# Patient Record
Sex: FEMALE | Race: WHITE | Employment: OTHER | URBAN - METROPOLITAN AREA
[De-identification: names, ages, dates, MRNs, and addresses within clinical notes are randomized per-mention and may not be internally consistent; named-entity substitution may affect disease eponyms.]

---

## 2021-05-12 ENCOUNTER — HOSPITAL ENCOUNTER (OUTPATIENT)
Dept: NON INVASIVE DIAGNOSTICS | Age: 76
Discharge: HOME | End: 2021-05-12
Payer: MEDICARE

## 2021-05-12 VITALS
WEIGHT: 155 LBS | RESPIRATION RATE: 18 BRPM | DIASTOLIC BLOOD PRESSURE: 56 MMHG | HEART RATE: 62 BPM | BODY MASS INDEX: 29.27 KG/M2 | HEIGHT: 61 IN | SYSTOLIC BLOOD PRESSURE: 158 MMHG | OXYGEN SATURATION: 98 %

## 2021-05-12 DIAGNOSIS — I48.91 ATRIAL FIBRILLATION, UNSPECIFIED TYPE (HCC): ICD-10-CM

## 2021-05-12 LAB
ATRIAL RATE: 54 BPM
CALCULATED P AXIS, ECG09: 91 DEGREES
CALCULATED R AXIS, ECG10: 54 DEGREES
CALCULATED R AXIS, ECG10: 58 DEGREES
CALCULATED T AXIS, ECG11: -64 DEGREES
CALCULATED T AXIS, ECG11: 58 DEGREES
DIAGNOSIS, 93000: NORMAL
DIAGNOSIS, 93000: NORMAL
P-R INTERVAL, ECG05: 200 MS
Q-T INTERVAL, ECG07: 376 MS
Q-T INTERVAL, ECG07: 424 MS
QRS DURATION, ECG06: 86 MS
QRS DURATION, ECG06: 86 MS
QTC CALCULATION (BEZET), ECG08: 397 MS
QTC CALCULATION (BEZET), ECG08: 402 MS
VENTRICULAR RATE, ECG03: 54 BPM
VENTRICULAR RATE, ECG03: 67 BPM

## 2021-05-12 PROCEDURE — 92960 CARDIOVERSION ELECTRIC EXT: CPT

## 2021-05-12 PROCEDURE — 77030018729 HC ELECTRD DEFIB PAD CARD -B

## 2021-05-12 PROCEDURE — 74011250636 HC RX REV CODE- 250/636: Performed by: INTERNAL MEDICINE

## 2021-05-12 PROCEDURE — 99152 MOD SED SAME PHYS/QHP 5/>YRS: CPT

## 2021-05-12 PROCEDURE — 93005 ELECTROCARDIOGRAM TRACING: CPT

## 2021-05-12 RX ORDER — FLECAINIDE ACETATE 100 MG/1
100 TABLET ORAL 2 TIMES DAILY
Qty: 60 TAB | Refills: 0 | Status: ON HOLD
Start: 2021-05-12 | End: 2022-02-08 | Stop reason: ALTCHOICE

## 2021-05-12 RX ORDER — FENTANYL CITRATE 50 UG/ML
12.5-5 INJECTION, SOLUTION INTRAMUSCULAR; INTRAVENOUS
Status: DISCONTINUED | OUTPATIENT
Start: 2021-05-12 | End: 2021-05-12

## 2021-05-12 RX ORDER — MIDAZOLAM HYDROCHLORIDE 1 MG/ML
.5-2 INJECTION, SOLUTION INTRAMUSCULAR; INTRAVENOUS
Status: DISCONTINUED | OUTPATIENT
Start: 2021-05-12 | End: 2021-05-12

## 2021-05-12 RX ADMIN — MIDAZOLAM 2 MG: 1 INJECTION INTRAMUSCULAR; INTRAVENOUS at 08:17

## 2021-05-12 RX ADMIN — FENTANYL CITRATE 50 MCG: 50 INJECTION, SOLUTION INTRAMUSCULAR; INTRAVENOUS at 08:17

## 2021-05-12 RX ADMIN — MIDAZOLAM 1 MG: 1 INJECTION INTRAMUSCULAR; INTRAVENOUS at 08:21

## 2021-05-12 NOTE — PROGRESS NOTES
Received report from [facility], [place]. Pt arrived to recovery bay with belongings without distress. Education provided call bell at her side.

## 2021-05-12 NOTE — PROGRESS NOTES
0700 - Patient arrived to Non-Invasive Cardiology Lab for Out Patient Cardioversion Procedure. Staff introduced to patient. Patient identifiers verified with Name and Date of Birth. Procedure verified with patient. Consent forms reviewed and signed by patient or authorized representative and verified. Allergies verified. Patient informed of procedure and plan of care. Questions answered with review. Patient on cardiac monitor, non-invasive blood pressure, SPO2 monitor. On 2LNC oxygen. Patient is A&Ox3. Patient reports no complaints. Patient on stretcher, in low position, with side rails up. Patient instructed to call for assistance as needed. Family (sister, Willie West) in waiting room.

## 2021-05-12 NOTE — DISCHARGE INSTRUCTIONS
DISCHARGE SUMMARY       The following personal items collected during your admission are returned to you:   Dental Appliance:  Dentures with patient  Clothing:  Mask and clothing        PATIENT INSTRUCTIONS: Continue taking all the same medications as previously prescribed. Start Flecainide 50 mg once in the morning and once in the evening. The medication has been called into your pharmacy. Please note the tablets will be in 100 mg tablets so please break the tablets in half to make the correct dose ( take 1/2 tablet in the morning and take 1/2 tablet in the evening). The cardioversion procedure can cause redness to the skin where the patches were placed. You may treat this with Aloe or Cortisone cream over the counter lotion. If the skin appears very reddened or blistered contact your physician      Call in the morning to make an appointment for you follow-up EKG in 2 weeks at [address] [place] [address]. What to do at Home:  Recommended activity: No driving today      The discharge information has been reviewed with the PATIENT/sister. The PATIENT  verbalized understanding. Cardiology Discharge Instructions             Patient ID:  Ángel Porras  [id number]  31 y.o.  1945    Admit Date: 5/12/2021     Discharge Date: 5/12/2021   Physician: Jordon Recinos MD    Admission and Discharge Diagnoses include:  1. Atrial fibrillation, persistent    Cardiology Procedures this Admission:  1. Electrical cardioversion, external, successfully restoring sinus rhythm    Visit Vitals  BP (!) 136/59 (BP 1 Location: Left upper arm, BP Patient Position: Reclining)   Pulse (!) 57   Resp 16   Ht 5' 1\" (1.549 m)   Wt 70.3 kg (155 lb)   SpO2 96%   BMI 29.29 kg/m²       Disposition: home with a     Patient Instructions:   Please continue all your usual medications AND start taking flecainide 50 mg by mouth twice daily.   The flecainide comes in a 100 mg tablet, please cut this in half to make the right dose.      FOLLOW-UP:       Call the office [phone] for any questions or concerns. Please make a follow-up in the office for 2 weeks to get an ECG since flecainide can cause minor and expected changes to the ECG and will want to have a good baseline study for the record. [address] [facility], [address]    [phone]  [place], [address] [facility]    [email or web address]    Thank you for placing your trust for your heart with the physicians and staff of [facility] We have long provided [facility] with the most advanced cardiovascular care possible using a personalized and caring approach. And we hope to continue this strong tradition well into the future.     Signed:  James Aguillon MD  5/12/2021

## 2021-05-12 NOTE — PROGRESS NOTES
Pt sedated with 3mg Versed and 50mcg Fentanyl for Cardioversion, gave one synchronized shock at 200 Joules, Afib/flutter converted to Sinus Rhythm/Mir. (monitored sedation from 0817 to 4577)    1031 -  Report to MELISSA Dewitt - pt moved to recovery - pt talking w/ Dr. Diana Ordoñez - Dr. Diana Ordoñez also spoke w/sister in waiting room

## 2021-05-12 NOTE — PROGRESS NOTES
Will continue the usual medications with successful cardioversion and ADD flecainide 50 mg po BID. She will need to get an ECG in 2 weeks in the office.

## 2021-05-20 ENCOUNTER — HOSPITAL ENCOUNTER (EMERGENCY)
Age: 76
Discharge: HOME | End: 2021-05-20
Attending: STUDENT IN AN ORGANIZED HEALTH CARE EDUCATION/TRAINING PROGRAM
Payer: MEDICARE

## 2021-05-20 ENCOUNTER — APPOINTMENT (OUTPATIENT)
Dept: GENERAL RADIOLOGY | Age: 76
End: 2021-05-20
Attending: EMERGENCY MEDICINE
Payer: MEDICARE

## 2021-05-20 VITALS
SYSTOLIC BLOOD PRESSURE: 162 MMHG | WEIGHT: 154 LBS | BODY MASS INDEX: 29.07 KG/M2 | DIASTOLIC BLOOD PRESSURE: 58 MMHG | HEIGHT: 61 IN | TEMPERATURE: 98.4 F | HEART RATE: 54 BPM | RESPIRATION RATE: 20 BRPM | OXYGEN SATURATION: 98 %

## 2021-05-20 DIAGNOSIS — R06.00 DYSPNEA, UNSPECIFIED TYPE: Primary | ICD-10-CM

## 2021-05-20 LAB
ALBUMIN SERPL-MCNC: 3.5 G/DL (ref 3.5–5)
ALBUMIN/GLOB SERPL: 0.9 {RATIO} (ref 1.1–2.2)
ALP SERPL-CCNC: 87 U/L (ref 45–117)
ALT SERPL-CCNC: 21 U/L (ref 12–78)
ANION GAP SERPL CALC-SCNC: 6 MMOL/L (ref 5–15)
AST SERPL-CCNC: 20 U/L (ref 15–37)
ATRIAL RATE: 55 BPM
BASOPHILS # BLD: 0 K/UL (ref 0–0.1)
BASOPHILS NFR BLD: 1 % (ref 0–1)
BILIRUB SERPL-MCNC: 0.9 MG/DL (ref 0.2–1)
BNP SERPL-MCNC: 919 PG/ML
BUN SERPL-MCNC: 15 MG/DL (ref 6–20)
BUN/CREAT SERPL: 15 (ref 12–20)
CALCIUM SERPL-MCNC: 8.6 MG/DL (ref 8.5–10.1)
CALCULATED P AXIS, ECG09: 66 DEGREES
CALCULATED R AXIS, ECG10: 67 DEGREES
CALCULATED T AXIS, ECG11: 1 DEGREES
CHLORIDE SERPL-SCNC: 100 MMOL/L (ref 97–108)
CK SERPL-CCNC: 60 U/L (ref 26–192)
CO2 SERPL-SCNC: 26 MMOL/L (ref 21–32)
CREAT SERPL-MCNC: 0.99 MG/DL (ref 0.55–1.02)
DIAGNOSIS, 93000: NORMAL
DIFFERENTIAL METHOD BLD: NORMAL
EOSINOPHIL # BLD: 0.2 K/UL (ref 0–0.4)
EOSINOPHIL NFR BLD: 3 % (ref 0–7)
ERYTHROCYTE [DISTWIDTH] IN BLOOD BY AUTOMATED COUNT: 13.6 % (ref 11.5–14.5)
GLOBULIN SER CALC-MCNC: 3.9 G/DL (ref 2–4)
GLUCOSE SERPL-MCNC: 119 MG/DL (ref 65–100)
HCT VFR BLD AUTO: 37.1 % (ref 35–47)
HGB BLD-MCNC: 11.9 G/DL (ref 11.5–16)
IMM GRANULOCYTES # BLD AUTO: 0 K/UL (ref 0–0.04)
IMM GRANULOCYTES NFR BLD AUTO: 0 % (ref 0–0.5)
LYMPHOCYTES # BLD: 1.8 K/UL (ref 0.8–3.5)
LYMPHOCYTES NFR BLD: 30 % (ref 12–49)
MCH RBC QN AUTO: 28.9 PG (ref 26–34)
MCHC RBC AUTO-ENTMCNC: 32.1 G/DL (ref 30–36.5)
MCV RBC AUTO: 90 FL (ref 80–99)
MONOCYTES # BLD: 0.6 K/UL (ref 0–1)
MONOCYTES NFR BLD: 11 % (ref 5–13)
NEUTS SEG # BLD: 3.2 K/UL (ref 1.8–8)
NEUTS SEG NFR BLD: 55 % (ref 32–75)
NRBC # BLD: 0 K/UL (ref 0–0.01)
NRBC BLD-RTO: 0 PER 100 WBC
P-R INTERVAL, ECG05: 168 MS
PLATELET # BLD AUTO: 167 K/UL (ref 150–400)
PMV BLD AUTO: 11.1 FL (ref 8.9–12.9)
POTASSIUM SERPL-SCNC: 4.3 MMOL/L (ref 3.5–5.1)
PROT SERPL-MCNC: 7.4 G/DL (ref 6.4–8.2)
Q-T INTERVAL, ECG07: 406 MS
QRS DURATION, ECG06: 94 MS
QTC CALCULATION (BEZET), ECG08: 388 MS
RBC # BLD AUTO: 4.12 M/UL (ref 3.8–5.2)
SODIUM SERPL-SCNC: 132 MMOL/L (ref 136–145)
TROPONIN I SERPL-MCNC: <0.05 NG/ML
VENTRICULAR RATE, ECG03: 55 BPM
WBC # BLD AUTO: 5.8 K/UL (ref 3.6–11)

## 2021-05-20 PROCEDURE — 71046 X-RAY EXAM CHEST 2 VIEWS: CPT

## 2021-05-20 PROCEDURE — 36415 COLL VENOUS BLD VENIPUNCTURE: CPT

## 2021-05-20 PROCEDURE — 84484 ASSAY OF TROPONIN QUANT: CPT

## 2021-05-20 PROCEDURE — 99284 EMERGENCY DEPT VISIT MOD MDM: CPT

## 2021-05-20 PROCEDURE — 83880 ASSAY OF NATRIURETIC PEPTIDE: CPT

## 2021-05-20 PROCEDURE — 93005 ELECTROCARDIOGRAM TRACING: CPT

## 2021-05-20 PROCEDURE — 80053 COMPREHEN METABOLIC PANEL: CPT

## 2021-05-20 PROCEDURE — 82550 ASSAY OF CK (CPK): CPT

## 2021-05-20 PROCEDURE — 85025 COMPLETE CBC W/AUTO DIFF WBC: CPT

## 2021-05-20 NOTE — ED PROVIDER NOTES
EMERGENCY DEPARTMENT HISTORY AND PHYSICAL EXAM      Date: 5/20/2021  Patient Name: Sweta Lang    History of Presenting Illness     Chief Complaint   Patient presents with    Shortness of Breath     x a few months. Pt has sleep study done that showed A-fib. Pt was evaluated by Cards and had cardioversion and told she should feel better, however pt reports SOB is worse. Pt labored in triage speaking in complete sentenses. History Provided By: Patient    HPI: Sweta Lang, 64 y.o. female with PMHx significant for A. 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 She reports that her cardioversion on 5/12 she has experienced worsening dyspnea on exertion, today noticed even dyspnea at rest.  Endorses intermittent chest tightness throughout this time as well. Also endorses bilateral lower extremity edema. Denies any fevers, chills, cough, headache, nausea or vomiting. Had some mild GI upset last week which resolved on Monday, denies any constipation or diarrhea. Patient states that she received both doses of her Covid vaccine      PCP: Didi Hdz MD    Current Outpatient Medications   Medication Sig Dispense Refill    apixaban (Eliquis) 5 mg tablet Take 5 mg by mouth two (2) times a day.  flecainide (TAMBOCOR) 100 mg tablet Take 1 Tab by mouth two (2) times a day. 60 Tab 0    simvastatin (ZOCOR) 20 mg tablet Take 20 mg by mouth nightly.  amLODIPine (NORVASC) 5 mg tablet Take 5 mg by mouth nightly.  Cholecalciferol, Vitamin D3, (VITAMIN D3) 1,000 unit Cap Take  by mouth.  omega-3 fatty acids-vitamin e (FISH OIL) 1,000 mg Cap Take 1 Cap by mouth daily.  psyllium (METAMUCIL SMOOTH TEXTURE) packet Take 1 Packet by mouth two (2) times a day.  dicyclomine (BENTYL) 10 mg capsule Take 10 mg by mouth four (4) times daily as needed.  metoprolol-XL (TOPROL XL) 50 mg XL tablet Take 25 mg by mouth nightly.  levothyroxine (SYNTHROID) 50 mcg tablet Take 75 mcg by mouth Daily (before breakfast). mon and thurs takes an additional 37.5mcg      esomeprazole (NEXIUM) 40 mg capsule Take  by mouth daily. Past History     Past Medical History:  Past Medical History:   Diagnosis Date    Aortic stenosis     secondary to rheumatic fever as a child, s/p AVR with porcine valve 10/09    Arrhythmia 2021    afib/flutter    Arrhythmia 2020    SVT    Arthritis     generalized    Diverticulosis     GERD (gastroesophageal reflux disease)     Ill-defined condition     diverticulosis    Other and unspecified hyperlipidemia     Sleep apnea     BiPAP    Stroke ([facility] [address]     5 days post post AOVR; no recurrence    Unspecified essential hypertension     Unspecified hypothyroidism        Past Surgical History:  Past Surgical History:   Procedure Laterality Date    DELIVERY       x3    FOOT/TOES SURGERY PROC UNLISTED      bilateral    HX AORTIC VALVE REPLACEMENT      10/09    HX APPENDECTOMY      HX CATARACT REMOVAL      bilateral and lens implant    HX GYN      3 c section    HX HYSTERECTOMY      HX ORTHOPAEDIC      tendon realigment, bunionectomy    HX ORTHOPAEDIC  14    RIGHT TOTAL KNEE ARTHROPLASTY        Family History:  Family History   Problem Relation Age of Onset    Thyroid Disease Sister     Other Other         brain aneurysm in father, paternal aunt and uncle and sister       Social History:  Social History     Tobacco Use    Smoking status: Never Smoker    Smokeless tobacco: Never Used   Vaping Use    Vaping Use: Never used   Substance Use Topics    Alcohol use: Yes     Comment: glass of wine every few months    Drug use: Yes     Types: Prescription, OTC       Allergies:   Allergies   Allergen Reactions    Penicillins Swelling     Difficulty breatheing    Erythromycin Nausea and Vomiting         Review of Systems   Review of Systems  Constitutional: Negative for fever, chills, and fatigue. HENT: Negative for congestion, sore throat, rhinorrhea, sneezing and neck stiffness   Eyes: Negative for discharge and redness. Respiratory: Shortness of breath, particularly on exertion  Cardiovascular: Chest tightness  Gastrointestinal: Negative for nausea, vomiting, abdominal pain, constipation, diarrhea and blood in stool. Genitourinary: Negative for dysuria, urgency, frequency, hematuria, flank pain, decreased urine volume, discharge,   Musculoskeletal: Negative for myalgias or joint pain . Skin: Negative for rash or lesions . Neurological: Negative weakness, light-headedness, numbness and headaches. Physical Exam   Physical Exam    GENERAL: alert and oriented, no acute distress  EYES: PEERL, No injection, discharge or icterus. ENT: Mucous membranes pink and moist.  NECK: Supple  LUNGS: Airway patent. Non-labored respirations. Breath sounds clear with good air entry bilaterally. HEART: Regular rate and rhythm. Mild bilateral lower extremity edema  ABDOMEN: Non-distended and non-tender, without guarding or rebound.   SKIN:  warm, dry  EXTREMITIES: Without swelling, tenderness or deformity, symmetric with normal ROM  NEUROLOGICAL: Alert, oriented      Diagnostic Study Results     Labs -     Recent Results (from the past 12 hour(s))   CBC WITH AUTOMATED DIFF    Collection Time: 05/20/21 11:26 AM   Result Value Ref Range    WBC 5.8 3.6 - 11.0 K/uL    RBC 4.12 3.80 - 5.20 M/uL    HGB 11.9 11.5 - 16.0 g/dL    HCT 37.1 35.0 - 47.0 %    MCV 90.0 80.0 - 99.0 FL    MCH 28.9 26.0 - 34.0 PG    MCHC 32.1 30.0 - 36.5 g/dL    RDW 13.6 11.5 - 14.5 %    PLATELET 876 099 - 911 K/uL    MPV 11.1 8.9 - 12.9 FL    NRBC 0.0 0  WBC    ABSOLUTE NRBC 0.00 0.00 - 0.01 K/uL    NEUTROPHILS 55 32 - 75 %    LYMPHOCYTES 30 12 - 49 %    MONOCYTES 11 5 - 13 %    EOSINOPHILS 3 0 - 7 %    BASOPHILS 1 0 - 1 %    IMMATURE GRANULOCYTES 0 0.0 - 0.5 %    ABS. NEUTROPHILS 3.2 1.8 - 8.0 K/UL    ABS. LYMPHOCYTES 1.8 0.8 - 3.5 K/UL    ABS. MONOCYTES 0.6 0.0 - 1.0 K/UL    ABS. EOSINOPHILS 0.2 0.0 - 0.4 K/UL    ABS. BASOPHILS 0.0 0.0 - 0.1 K/UL    ABS. IMM. GRANS. 0.0 0.00 - 0.04 K/UL    DF AUTOMATED     METABOLIC PANEL, COMPREHENSIVE    Collection Time: 05/20/21 11:26 AM   Result Value Ref Range    Sodium 132 (L) 136 - 145 mmol/L    Potassium 4.3 3.5 - 5.1 mmol/L    Chloride 100 97 - 108 mmol/L    CO2 26 21 - 32 mmol/L    Anion gap 6 5 - 15 mmol/L    Glucose 119 (H) 65 - 100 mg/dL    BUN 15 6 - 20 MG/DL    Creatinine 0.99 0.55 - 1.02 MG/DL    BUN/Creatinine ratio 15 12 - 20      GFR est AA >60 >60 ml/min/1.73m2    GFR est non-AA 54 (L) >60 ml/min/1.73m2    Calcium 8.6 8.5 - 10.1 MG/DL    Bilirubin, total 0.9 0.2 - 1.0 MG/DL    ALT (SGPT) 21 12 - 78 U/L    AST (SGOT) 20 15 - 37 U/L    Alk.  phosphatase 87 45 - 117 U/L    Protein, total 7.4 6.4 - 8.2 g/dL    Albumin 3.5 3.5 - 5.0 g/dL    Globulin 3.9 2.0 - 4.0 g/dL    A-G Ratio 0.9 (L) 1.1 - 2.2     CK W/ REFLX CKMB    Collection Time: 05/20/21 11:26 AM   Result Value Ref Range    CK 60 26 - 192 U/L   TROPONIN I    Collection Time: 05/20/21 11:26 AM   Result Value Ref Range    Troponin-I, Qt. <0.05 <0.05 ng/mL   NT-PRO BNP    Collection Time: 05/20/21 11:26 AM   Result Value Ref Range    NT pro- (H) <450 PG/ML   EKG, 12 LEAD, INITIAL    Collection Time: 05/20/21 11:28 AM   Result Value Ref Range    Ventricular Rate 55 BPM    Atrial Rate 55 BPM    P-R Interval 168 ms    QRS Duration 94 ms    Q-T Interval 406 ms    QTC Calculation (Bezet) 388 ms    Calculated P Axis 66 degrees    Calculated R Axis 67 degrees    Calculated T Axis 1 degrees    Diagnosis       Sinus bradycardia  Possible Anterior infarct (cited on or before 20-MAY-2021)  When compared with ECG of 12-MAY-2021 08:23,  No significant change was found         Radiologic Studies -   XR CHEST PA LAT   Final Result   No acute process or change compared to the prior exam.           CT Results  (Last 48 hours)    None        CXR Results  (Last 48 hours)               05/20/21 1203  XR CHEST PA LAT Final result    Impression:  No acute process or change compared to the prior exam.           Narrative:  Exam:  2 view chest       Indication: Shortness of breath       Comparison to 3/18/2014. PA and lateral views demonstrate normal heart size. The patient is status post   median sternotomy. There is no acute process in the lung fields. The osseous   structures are unremarkable. Medical Decision Making       I reviewed the vital signs, available nursing notes, past medical history, past surgical history, family history and social history. Vital Signs-Reviewed the patient's vital signs. Patient Vitals for the past 12 hrs:   Temp Pulse Resp BP SpO2   05/20/21 1119 98.4 °F (36.9 °C) (!) 49 18 (!) 161/60 100 %         Records Reviewed: Nursing Notes and Old Medical Records    Provider Notes (Medical Decision Making):   55-year-old female with PMH A. fib status post recent cardioversion, porcine Nordic valve, hypertension who presents for evaluation of worsening shortness of breath over the last week. On arrival patient is in acute distress, generally well-appearing, nontoxic. Heart and lung sounds soft nontender abdomen normal peripheral pulses. Mild lower extremity edema bilaterally. Exam otherwise relatively unremarkable. Vitals are stable on room air. EKG is sinus bradycardia without evidence of ischemia. Relatively unchanged from post cardioversion EKG. Labs ordered on triage are all within normal limits, negative troponin, x-ray negative for any acute findings. Will add on a BNP, perform bedside echo to evaluate for bedside fluid overload, general cardiac function. Dispo pending initial work-up    ED Course:   Initial assessment performed.  The patients presenting problems have been discussed, and they are in agreement with the care plan formulated and outlined with them. I have encouraged them to ask questions as they arise throughout their visit. ED Course as of May 20 1514   Thu May 20, 2021   1348 NT-PRO BNP(!):    NT pro-(!) [CC]   1434 Bedside echo reveals generally normal systolic function. No pericardial effusion. Normal chamber size without dilation. IVC is within normal limits with respiratory variation. Spoken with pharmacy in regards to flecainide, it is documented that up to 10% of patients may experience shortness of breath when started on this medication    [CC]      ED Course User Index  [CC] Bushra Wallace MD       PROGRESS:  The patient has been re-evaluated and sx have improved. Reviewed available results with patient and have counseled them on diagnosis and care plan. They have expressed understanding, and all their questions have been answered. PLAN:  1. Discharge with cardiology follow up    Diagnosis     Clinical Impression:   1. Dyspnea, unspecified type        Please note that this dictation was completed with Dragon, computer voice recognition software. Quite often unanticipated grammatical, syntax, homophones, and other interpretive errors are inadvertently transcribed by the computer software. Please disregard these errors. Additionally, please excuse any errors that have escaped final proofreading.

## 2021-05-20 NOTE — DISCHARGE INSTRUCTIONS
Please call your Cardiologist to ask if they would still like you to be taking your flecainide until your next appointment. Please continue to take all of your other medications as directed otherwise.  Return to the emergency department if your symptoms worsen

## 2021-05-20 NOTE — ED NOTES
Pt discharged by MD . Pt provided with discharge instructions Rx and instructions on follow up care. Pt out of ED in wheel chair  accompanied by family.

## 2021-12-27 ENCOUNTER — HOSPITAL ENCOUNTER (EMERGENCY)
Age: 76
Discharge: HOME | End: 2021-12-27
Attending: EMERGENCY MEDICINE
Payer: MEDICARE

## 2021-12-27 ENCOUNTER — APPOINTMENT (OUTPATIENT)
Dept: GENERAL RADIOLOGY | Age: 76
End: 2021-12-27
Attending: EMERGENCY MEDICINE
Payer: MEDICARE

## 2021-12-27 VITALS
SYSTOLIC BLOOD PRESSURE: 189 MMHG | HEIGHT: 61 IN | RESPIRATION RATE: 15 BRPM | WEIGHT: 156 LBS | TEMPERATURE: 98.2 F | BODY MASS INDEX: 29.45 KG/M2 | HEART RATE: 74 BPM | OXYGEN SATURATION: 100 % | DIASTOLIC BLOOD PRESSURE: 70 MMHG

## 2021-12-27 DIAGNOSIS — I50.9 ACUTE CONGESTIVE HEART FAILURE, UNSPECIFIED HEART FAILURE TYPE (HCC): Primary | ICD-10-CM

## 2021-12-27 LAB
ALBUMIN SERPL-MCNC: 3.6 G/DL (ref 3.5–5)
ALBUMIN/GLOB SERPL: 0.9 {RATIO} (ref 1.1–2.2)
ALP SERPL-CCNC: 96 U/L (ref 45–117)
ALT SERPL-CCNC: 25 U/L (ref 12–78)
ANION GAP SERPL CALC-SCNC: 7 MMOL/L (ref 5–15)
AST SERPL-CCNC: 22 U/L (ref 15–37)
ATRIAL RATE: 69 BPM
BASOPHILS # BLD: 0 K/UL (ref 0–0.1)
BASOPHILS NFR BLD: 0 % (ref 0–1)
BILIRUB SERPL-MCNC: 0.9 MG/DL (ref 0.2–1)
BNP SERPL-MCNC: 756 PG/ML
BUN SERPL-MCNC: 14 MG/DL (ref 6–20)
BUN/CREAT SERPL: 13 (ref 12–20)
CALCIUM SERPL-MCNC: 9.2 MG/DL (ref 8.5–10.1)
CALCULATED P AXIS, ECG09: 83 DEGREES
CALCULATED R AXIS, ECG10: 94 DEGREES
CALCULATED T AXIS, ECG11: 39 DEGREES
CHLORIDE SERPL-SCNC: 100 MMOL/L (ref 97–108)
CK SERPL-CCNC: 61 U/L (ref 26–192)
CO2 SERPL-SCNC: 26 MMOL/L (ref 21–32)
CREAT SERPL-MCNC: 1.05 MG/DL (ref 0.55–1.02)
DIAGNOSIS, 93000: NORMAL
DIFFERENTIAL METHOD BLD: ABNORMAL
EOSINOPHIL # BLD: 0.2 K/UL (ref 0–0.4)
EOSINOPHIL NFR BLD: 3 % (ref 0–7)
ERYTHROCYTE [DISTWIDTH] IN BLOOD BY AUTOMATED COUNT: 13.8 % (ref 11.5–14.5)
GLOBULIN SER CALC-MCNC: 4.1 G/DL (ref 2–4)
GLUCOSE SERPL-MCNC: 124 MG/DL (ref 65–100)
HCT VFR BLD AUTO: 37.4 % (ref 35–47)
HGB BLD-MCNC: 12.1 G/DL (ref 11.5–16)
IMM GRANULOCYTES # BLD AUTO: 0 K/UL (ref 0–0.04)
IMM GRANULOCYTES NFR BLD AUTO: 1 % (ref 0–0.5)
INR PPP: 1 (ref 0.9–1.1)
LYMPHOCYTES # BLD: 1.7 K/UL (ref 0.8–3.5)
LYMPHOCYTES NFR BLD: 28 % (ref 12–49)
MCH RBC QN AUTO: 29.1 PG (ref 26–34)
MCHC RBC AUTO-ENTMCNC: 32.4 G/DL (ref 30–36.5)
MCV RBC AUTO: 89.9 FL (ref 80–99)
MONOCYTES # BLD: 0.6 K/UL (ref 0–1)
MONOCYTES NFR BLD: 9 % (ref 5–13)
NEUTS SEG # BLD: 3.5 K/UL (ref 1.8–8)
NEUTS SEG NFR BLD: 59 % (ref 32–75)
NRBC # BLD: 0 K/UL (ref 0–0.01)
NRBC BLD-RTO: 0 PER 100 WBC
P-R INTERVAL, ECG05: 216 MS
PLATELET # BLD AUTO: 161 K/UL (ref 150–400)
PMV BLD AUTO: 10.8 FL (ref 8.9–12.9)
POTASSIUM SERPL-SCNC: 3.8 MMOL/L (ref 3.5–5.1)
PROT SERPL-MCNC: 7.7 G/DL (ref 6.4–8.2)
PROTHROMBIN TIME: 10.6 SEC (ref 9–11.1)
Q-T INTERVAL, ECG07: 404 MS
QRS DURATION, ECG06: 108 MS
QTC CALCULATION (BEZET), ECG08: 432 MS
RBC # BLD AUTO: 4.16 M/UL (ref 3.8–5.2)
SODIUM SERPL-SCNC: 133 MMOL/L (ref 136–145)
TROPONIN-HIGH SENSITIVITY: 11 NG/L (ref 0–51)
TROPONIN-HIGH SENSITIVITY: 16 NG/L (ref 0–51)
VENTRICULAR RATE, ECG03: 69 BPM
WBC # BLD AUTO: 6 K/UL (ref 3.6–11)

## 2021-12-27 PROCEDURE — 96374 THER/PROPH/DIAG INJ IV PUSH: CPT

## 2021-12-27 PROCEDURE — 85610 PROTHROMBIN TIME: CPT

## 2021-12-27 PROCEDURE — 99284 EMERGENCY DEPT VISIT MOD MDM: CPT

## 2021-12-27 PROCEDURE — 84484 ASSAY OF TROPONIN QUANT: CPT

## 2021-12-27 PROCEDURE — 83880 ASSAY OF NATRIURETIC PEPTIDE: CPT

## 2021-12-27 PROCEDURE — 80053 COMPREHEN METABOLIC PANEL: CPT

## 2021-12-27 PROCEDURE — 74011250637 HC RX REV CODE- 250/637: Performed by: EMERGENCY MEDICINE

## 2021-12-27 PROCEDURE — 93005 ELECTROCARDIOGRAM TRACING: CPT

## 2021-12-27 PROCEDURE — 71045 X-RAY EXAM CHEST 1 VIEW: CPT

## 2021-12-27 PROCEDURE — 36415 COLL VENOUS BLD VENIPUNCTURE: CPT

## 2021-12-27 PROCEDURE — 82550 ASSAY OF CK (CPK): CPT

## 2021-12-27 PROCEDURE — 94760 N-INVAS EAR/PLS OXIMETRY 1: CPT

## 2021-12-27 PROCEDURE — 74011250636 HC RX REV CODE- 250/636: Performed by: EMERGENCY MEDICINE

## 2021-12-27 PROCEDURE — 85025 COMPLETE CBC W/AUTO DIFF WBC: CPT

## 2021-12-27 RX ORDER — ACETAMINOPHEN 500 MG
1000 TABLET ORAL ONCE
Status: COMPLETED | OUTPATIENT
Start: 2021-12-27 | End: 2021-12-27

## 2021-12-27 RX ORDER — FUROSEMIDE 10 MG/ML
40 INJECTION INTRAMUSCULAR; INTRAVENOUS
Status: COMPLETED | OUTPATIENT
Start: 2021-12-27 | End: 2021-12-27

## 2021-12-27 RX ADMIN — ACETAMINOPHEN 1000 MG: 500 TABLET ORAL at 12:52

## 2021-12-27 RX ADMIN — FUROSEMIDE 40 MG: 10 INJECTION, SOLUTION INTRAMUSCULAR; INTRAVENOUS at 11:41

## 2021-12-27 NOTE — ED PROVIDER NOTES
EMERGENCY DEPARTMENT HISTORY AND PHYSICAL EXAM      Date: 12/27/2021  Patient Name: Efren Sinclair    History of Presenting Illness     Chief Complaint   Patient presents with    Shortness of Breath     for 3 days ; in AFib for three weeks; arrives by rescue. pt is on Eliquis since early April; had a cardiversion. Flecainaide increased to 100mg twice a day. History Provided By: Patient    HPI: Efren Sinclair, 64 y.o. female with PMHx significant for A. fib on Eliquis, aortic stenosis status post valve replacement, who presents with a chief complaint of fatigue and shortness of breath for last 3 days. Patient reports that she had medication changes for her A. fib 1/2 weeks ago. Her flecainide was increased and metoprolol was added to her medication regimen. Patient is not taken her metoprolol in the last several days as she states it lowered her heart rate too much and she was feeling poorly. She was seen by her cardiologist, Dr. Jeanne Tyler 4 days ago at which time her heart rate was well controlled so no additional medication changes were made. Patient admits she did not mention not taking her metoprolol at her appointment. She denies any productive cough, fever, known COVID-19 exposure. She has been vaccinated x2. PCP: Colt Funk MD    There are no other complaints, changes, or physical findings at this time. Current Outpatient Medications   Medication Sig Dispense Refill    apixaban (Eliquis) 5 mg tablet Take 5 mg by mouth two (2) times a day.  flecainide (TAMBOCOR) 100 mg tablet Take 1 Tab by mouth two (2) times a day. 60 Tab 0    simvastatin (ZOCOR) 20 mg tablet Take 20 mg by mouth nightly.  amLODIPine (NORVASC) 5 mg tablet Take 5 mg by mouth nightly.  Cholecalciferol, Vitamin D3, (VITAMIN D3) 1,000 unit Cap Take  by mouth.  omega-3 fatty acids-vitamin e (FISH OIL) 1,000 mg Cap Take 1 Cap by mouth daily.       psyllium (METAMUCIL SMOOTH TEXTURE) packet Take 1 Packet by mouth two (2) times a day.  dicyclomine (BENTYL) 10 mg capsule Take 10 mg by mouth four (4) times daily as needed.  metoprolol-XL (TOPROL XL) 50 mg XL tablet Take 25 mg by mouth nightly. (Patient not taking: Reported on 2021)      levothyroxine (SYNTHROID) 50 mcg tablet Take 75 mcg by mouth Daily (before breakfast). mon and thurs takes an additional 37.5mcg      esomeprazole (NEXIUM) 40 mg capsule Take  by mouth daily.        Past History     Past Medical History:  Past Medical History:   Diagnosis Date    Aortic stenosis     secondary to rheumatic fever as a child, s/p AVR with porcine valve 10/09    Arrhythmia 2021    afib/flutter    Arrhythmia 2020    SVT    Arthritis     generalized    Diverticulosis     GERD (gastroesophageal reflux disease)     Ill-defined condition     diverticulosis    Other and unspecified hyperlipidemia     Sleep apnea     BiPAP    Stroke ([facility] [address]     5 days post post AOVR; no recurrence    Unspecified essential hypertension     Unspecified hypothyroidism      Past Surgical History:  Past Surgical History:   Procedure Laterality Date    DELIVERY       x3    FOOT/TOES SURGERY PROC UNLISTED      bilateral    HX AORTIC VALVE REPLACEMENT      10/09    HX APPENDECTOMY      HX CATARACT REMOVAL      bilateral and lens implant    HX GYN      3 c section    HX HYSTERECTOMY      HX ORTHOPAEDIC      tendon realigment, bunionectomy    HX ORTHOPAEDIC  14    RIGHT TOTAL KNEE ARTHROPLASTY      Family History:  Family History   Problem Relation Age of Onset    Thyroid Disease Sister     Other Other         brain aneurysm in father, paternal aunt and uncle and sister     Social History:  Social History     Tobacco Use    Smoking status: Never Smoker    Smokeless tobacco: Never Used   Vaping Use    Vaping Use: Never used   Substance Use Topics    Alcohol use: Yes     Comment: glass of wine every few months    Drug use: Yes     Types: Prescription, OTC     Allergies: Allergies   Allergen Reactions    Penicillins Swelling     Difficulty breatheing    Erythromycin Nausea and Vomiting     Review of Systems   Review of Systems   Constitutional: Positive for fatigue. Negative for chills and fever. HENT: Negative for congestion, rhinorrhea and sore throat. Respiratory: Positive for shortness of breath. Negative for cough. Cardiovascular: Negative for chest pain. Gastrointestinal: Negative for abdominal pain, nausea and vomiting. Genitourinary: Negative for dysuria and urgency. Skin: Negative for rash. Neurological: Negative for dizziness, light-headedness and headaches. All other systems reviewed and are negative. Physical Exam   Physical Exam  Vitals and nursing note reviewed. Constitutional:       General: She is not in acute distress. Appearance: She is well-developed. HENT:      Head: Normocephalic and atraumatic. Eyes:      Conjunctiva/sclera: Conjunctivae normal.      Pupils: Pupils are equal, round, and reactive to light. Cardiovascular:      Rate and Rhythm: Normal rate and regular rhythm. Comments: Trace bilateral pitting edema  Pulmonary:      Effort: Pulmonary effort is normal. No respiratory distress. Breath sounds: Normal breath sounds. No stridor. Comments: Able to speak in complete sentences  Abdominal:      General: There is no distension. Palpations: Abdomen is soft. Tenderness: There is no abdominal tenderness. Musculoskeletal:         General: Normal range of motion. Cervical back: Normal range of motion. Skin:     General: Skin is warm and dry. Neurological:      Mental Status: She is alert and oriented to person, place, and time.        Diagnostic Study Results   Labs -     Recent Results (from the past 12 hour(s))   CBC WITH AUTOMATED DIFF    Collection Time: 12/27/21  9:46 AM   Result Value Ref Range    WBC 6.0 3.6 - 11.0 K/uL    RBC 4.16 3.80 - 5.20 M/uL HGB 12.1 11.5 - 16.0 g/dL    HCT 37.4 35.0 - 47.0 %    MCV 89.9 80.0 - 99.0 FL    MCH 29.1 26.0 - 34.0 PG    MCHC 32.4 30.0 - 36.5 g/dL    RDW 13.8 11.5 - 14.5 %    PLATELET 641 083 - 714 K/uL    MPV 10.8 8.9 - 12.9 FL    NRBC 0.0 0  WBC    ABSOLUTE NRBC 0.00 0.00 - 0.01 K/uL    NEUTROPHILS 59 32 - 75 %    LYMPHOCYTES 28 12 - 49 %    MONOCYTES 9 5 - 13 %    EOSINOPHILS 3 0 - 7 %    BASOPHILS 0 0 - 1 %    IMMATURE GRANULOCYTES 1 (H) 0.0 - 0.5 %    ABS. NEUTROPHILS 3.5 1.8 - 8.0 K/UL    ABS. LYMPHOCYTES 1.7 0.8 - 3.5 K/UL    ABS. MONOCYTES 0.6 0.0 - 1.0 K/UL    ABS. EOSINOPHILS 0.2 0.0 - 0.4 K/UL    ABS. BASOPHILS 0.0 0.0 - 0.1 K/UL    ABS. IMM. GRANS. 0.0 0.00 - 0.04 K/UL    DF AUTOMATED     METABOLIC PANEL, COMPREHENSIVE    Collection Time: 12/27/21  9:46 AM   Result Value Ref Range    Sodium 133 (L) 136 - 145 mmol/L    Potassium 3.8 3.5 - 5.1 mmol/L    Chloride 100 97 - 108 mmol/L    CO2 26 21 - 32 mmol/L    Anion gap 7 5 - 15 mmol/L    Glucose 124 (H) 65 - 100 mg/dL    BUN 14 6 - 20 MG/DL    Creatinine 1.05 (H) 0.55 - 1.02 MG/DL    BUN/Creatinine ratio 13 12 - 20      GFR est AA >60 >60 ml/min/1.73m2    GFR est non-AA 51 (L) >60 ml/min/1.73m2    Calcium 9.2 8.5 - 10.1 MG/DL    Bilirubin, total 0.9 0.2 - 1.0 MG/DL    ALT (SGPT) 25 12 - 78 U/L    AST (SGOT) 22 15 - 37 U/L    Alk.  phosphatase 96 45 - 117 U/L    Protein, total 7.7 6.4 - 8.2 g/dL    Albumin 3.6 3.5 - 5.0 g/dL    Globulin 4.1 (H) 2.0 - 4.0 g/dL    A-G Ratio 0.9 (L) 1.1 - 2.2     CK W/ REFLX CKMB    Collection Time: 12/27/21  9:46 AM   Result Value Ref Range    CK 61 26 - 192 U/L   TROPONIN-HIGH SENSITIVITY    Collection Time: 12/27/21  9:46 AM   Result Value Ref Range    Troponin-High Sensitivity 11 0 - 51 ng/L   NT-PRO BNP    Collection Time: 12/27/21  9:46 AM   Result Value Ref Range    NT pro- (H) <450 PG/ML   PROTHROMBIN TIME + INR    Collection Time: 12/27/21  9:46 AM   Result Value Ref Range    INR 1.0 0.9 - 1.1      Prothrombin time 10.6 9.0 - 11.1 sec   EKG, 12 LEAD, INITIAL    Collection Time: 12/27/21  9:46 AM   Result Value Ref Range    Ventricular Rate 69 BPM    Atrial Rate 69 BPM    P-R Interval 216 ms    QRS Duration 108 ms    Q-T Interval 404 ms    QTC Calculation (Bezet) 432 ms    Calculated P Axis 83 degrees    Calculated R Axis 94 degrees    Calculated T Axis 39 degrees    Diagnosis       Sinus rhythm with 1st degree AV block  Rightward axis  Anterior infarct (cited on or before 20-MAY-2021)  Confirmed by Ruggiero Render ([id number]) on 12/27/2021 3:10:57 PM     TROPONIN-HIGH SENSITIVITY    Collection Time: 12/27/21 12:08 PM   Result Value Ref Range    Troponin-High Sensitivity 16 0 - 51 ng/L       Radiologic Studies -   XR CHEST PORT   Final Result   1. Lungs demonstrate interstitial pulmonary edema especially in the mid lower   lung zones of mild degree. No pneumonia. XR CHEST PORT    Result Date: 12/27/2021  1. Lungs demonstrate interstitial pulmonary edema especially in the mid lower lung zones of mild degree. No pneumonia. Medical Decision Making   I am the first provider for this patient. I reviewed the vital signs, available nursing notes, past medical history, past surgical history, family history and social history. Vital Signs-Reviewed the patient's vital signs. Patient Vitals for the past 12 hrs:   Temp Pulse Resp BP SpO2   12/27/21 0940 98.2 °F (36.8 °C) 74 15 (!) 189/70 100 %       Pulse Oximetry Analysis - 100% on ra    Cardiac Monitor:   Rate: 74 bpm  Rhythm: Normal Sinus Rhythm      ED EKG interpretation:  Rhythm: normal sinus rhythm and 1st degree block; and regular . Rate (approx.): 69; Axis: right axis deviation; P wave: normal; QRS interval: normal ; ST/T wave: normal; Other findings: borderline ekg. This EKG was interpreted by JALEEL Jiménez MD,ED Provider.     Records Reviewed: Nursing Notes and Old Medical Records    Provider Notes (Medical Decision Making):   Patient presents with a chief complaint of shortness of breath and fatigue. On exam she is overall well-appearing, mildly hypertensive otherwise stable vital signs. She is satting well on room air and able to speak in complete sentences. Fatigue could be related to her recent medication changes. In terms of her shortness of breath, differential includes pneumonia, CHF, COVID-19. She has no infectious symptoms so I suspect more likely CHF. EKG shows normal sinus rhythm with a normal rate. Will check basic lab work, troponin, BNP, chest x-ray. ED Course:   Initial assessment performed. The patients presenting problems have been discussed, and they are in agreement with the care plan formulated and outlined with them. I have encouraged them to ask questions as they arise throughout their visit. X-ray with mild interstitial edema, suspect CHF as a cause of patient symptoms. She tells me she has been taking 20 mg of Lasix a day so we will give IV. Patient improved plan to discharge with double dose for 3 days then resume her regular dose as well as follow-up with her cardiologist.    ED Course as of 12/27/21 1810   Mon Dec 27, 2021   1147 Patient able to ambulate and maintain O2 sats of % on RA [DEQUAN]   1414 Patient has diuresed. Feeling significantly improved. Will d/c with instructions to double her lasix for the next 3 days and to follow up with cards [DEQUAN]      ED Course User Index  Chris Siu MD       Procedures:  Procedures    Critical Care:  none    Disposition:  Discharge Note:  The patient has been re-evaluated and is ready for discharge. Reviewed available results with patient. Counseled patient on diagnosis and care plan. Patient has expressed understanding, and all questions have been answered. Patient agrees with plan and agrees to follow up as recommended, or to return to the ED if their symptoms worsen.  Discharge instructions have been provided and explained to the patient, along with reasons to return to the ED. PLAN:  1. Discharge Medication List as of 12/27/2021  2:17 PM        2. Follow-up Information     Follow up With Specialties Details Why Contact Info    Juanita Mccarthy MD Cardio Vascular Surgery, Cardiology Schedule an appointment as soon as possible for a visit   [address]  [id number]  [phone]      [facility] EMERGENCY DEPT Emergency Medicine  As needed, If symptoms worsen [address]  [address] [facility]  [phone]        Return to ED if worse     Diagnosis     Clinical Impression:   1. Acute congestive heart failure, unspecified heart failure type [facility])            Please note that this dictation was completed with Ambassador, the computer voice recognition software. Quite often unanticipated grammatical, syntax, homophones, and other interpretive errors are inadvertently transcribed by the computer software. Please disregard these errors.   Please excuse any errors that have escaped final proofreading

## 2021-12-27 NOTE — DISCHARGE INSTRUCTIONS
Please double your lasix dose for the next 3 days and then resume your normal dose.  Please call your cardiologist for follow up

## 2021-12-27 NOTE — ED NOTES
Patient resting comfortably without any needs or complaints. Vital signs stable.  Call bell in reach

## 2021-12-27 NOTE — ED NOTES
Ambulation trial completed. Patient remained % on RA while walking. Patient complained of weakness and SOB during entire walk, worse once seated.    MD made aware

## 2022-01-18 ENCOUNTER — HOSPITAL ENCOUNTER (OUTPATIENT)
Dept: NON INVASIVE DIAGNOSTICS | Age: 77
Discharge: HOME | End: 2022-01-18
Payer: MEDICARE

## 2022-01-18 VITALS
HEIGHT: 61 IN | DIASTOLIC BLOOD PRESSURE: 73 MMHG | SYSTOLIC BLOOD PRESSURE: 152 MMHG | BODY MASS INDEX: 28.32 KG/M2 | HEART RATE: 52 BPM | RESPIRATION RATE: 17 BRPM | WEIGHT: 150 LBS | OXYGEN SATURATION: 99 %

## 2022-01-18 DIAGNOSIS — I48.91 ATRIAL FIBRILLATION, UNSPECIFIED TYPE (HCC): ICD-10-CM

## 2022-01-18 LAB
ATRIAL RATE: 46 BPM
CALCULATED P AXIS, ECG09: 74 DEGREES
CALCULATED R AXIS, ECG10: 54 DEGREES
CALCULATED R AXIS, ECG10: 56 DEGREES
CALCULATED T AXIS, ECG11: 36 DEGREES
CALCULATED T AXIS, ECG11: 67 DEGREES
DIAGNOSIS, 93000: NORMAL
DIAGNOSIS, 93000: NORMAL
ERYTHROCYTE [DISTWIDTH] IN BLOOD BY AUTOMATED COUNT: 13.5 % (ref 11.5–14.5)
HCT VFR BLD AUTO: 43.2 % (ref 35–47)
HGB BLD-MCNC: 14 G/DL (ref 11.5–16)
MAGNESIUM SERPL-MCNC: 2.4 MG/DL (ref 1.6–2.4)
MCH RBC QN AUTO: 28.9 PG (ref 26–34)
MCHC RBC AUTO-ENTMCNC: 32.4 G/DL (ref 30–36.5)
MCV RBC AUTO: 89.1 FL (ref 80–99)
NRBC # BLD: 0 K/UL (ref 0–0.01)
NRBC BLD-RTO: 0 PER 100 WBC
P-R INTERVAL, ECG05: 238 MS
PLATELET # BLD AUTO: 183 K/UL (ref 150–400)
PMV BLD AUTO: 11.4 FL (ref 8.9–12.9)
Q-T INTERVAL, ECG07: 398 MS
Q-T INTERVAL, ECG07: 494 MS
QRS DURATION, ECG06: 116 MS
QRS DURATION, ECG06: 124 MS
QTC CALCULATION (BEZET), ECG08: 432 MS
QTC CALCULATION (BEZET), ECG08: 464 MS
RBC # BLD AUTO: 4.85 M/UL (ref 3.8–5.2)
VENTRICULAR RATE, ECG03: 46 BPM
VENTRICULAR RATE, ECG03: 82 BPM
WBC # BLD AUTO: 5.2 K/UL (ref 3.6–11)

## 2022-01-18 PROCEDURE — 83735 ASSAY OF MAGNESIUM: CPT

## 2022-01-18 PROCEDURE — 99152 MOD SED SAME PHYS/QHP 5/>YRS: CPT

## 2022-01-18 PROCEDURE — 93005 ELECTROCARDIOGRAM TRACING: CPT

## 2022-01-18 PROCEDURE — 74011250636 HC RX REV CODE- 250/636: Performed by: INTERNAL MEDICINE

## 2022-01-18 PROCEDURE — 36415 COLL VENOUS BLD VENIPUNCTURE: CPT

## 2022-01-18 PROCEDURE — 92960 CARDIOVERSION ELECTRIC EXT: CPT

## 2022-01-18 PROCEDURE — 85027 COMPLETE CBC AUTOMATED: CPT

## 2022-01-18 PROCEDURE — 77030018729 HC ELECTRD DEFIB PAD CARD -B

## 2022-01-18 RX ORDER — FENTANYL CITRATE 50 UG/ML
12.5-5 INJECTION, SOLUTION INTRAMUSCULAR; INTRAVENOUS
Status: DISCONTINUED | OUTPATIENT
Start: 2022-01-18 | End: 2022-01-18

## 2022-01-18 RX ORDER — FLUTICASONE PROPIONATE 50 MCG
2 SPRAY, SUSPENSION (ML) NASAL
COMMUNITY

## 2022-01-18 RX ORDER — CELECOXIB 100 MG/1
200 CAPSULE ORAL DAILY
COMMUNITY
End: 2022-09-26

## 2022-01-18 RX ORDER — OMEPRAZOLE 20 MG/1
40 CAPSULE, DELAYED RELEASE ORAL DAILY
COMMUNITY
End: 2022-09-26

## 2022-01-18 RX ORDER — FUROSEMIDE 40 MG/1
20 TABLET ORAL DAILY
COMMUNITY
End: 2022-09-26

## 2022-01-18 RX ORDER — MIDAZOLAM HYDROCHLORIDE 1 MG/ML
.5-2 INJECTION, SOLUTION INTRAMUSCULAR; INTRAVENOUS
Status: DISCONTINUED | OUTPATIENT
Start: 2022-01-18 | End: 2022-01-18

## 2022-01-18 RX ADMIN — MIDAZOLAM HYDROCHLORIDE 1 MG: 1 INJECTION, SOLUTION INTRAMUSCULAR; INTRAVENOUS at 09:16

## 2022-01-18 RX ADMIN — MIDAZOLAM HYDROCHLORIDE 1 MG: 1 INJECTION, SOLUTION INTRAMUSCULAR; INTRAVENOUS at 09:21

## 2022-01-18 RX ADMIN — FENTANYL CITRATE 25 MCG: 50 INJECTION INTRAMUSCULAR; INTRAVENOUS at 09:19

## 2022-01-18 RX ADMIN — MIDAZOLAM HYDROCHLORIDE 2 MG: 1 INJECTION, SOLUTION INTRAMUSCULAR; INTRAVENOUS at 09:14

## 2022-01-18 RX ADMIN — FENTANYL CITRATE 25 MCG: 50 INJECTION INTRAMUSCULAR; INTRAVENOUS at 09:15

## 2022-01-18 NOTE — DISCHARGE INSTRUCTIONS
DISCHARGE SUMMARY     The following personal items collected during your admission are returned to you:   Dental Appliance:    Vision:    Hearing Aid:    Jewelry:    Clothing:      PATIENT INSTRUCTIONS: Continue taking all the same medications except stop metoprolol. The cardioversion procedure can cause redness to the skin where the patches were placed. You may treat this with Aloe or Cortisone cream over the counter lotion. If the skin appears very reddened or blistered contact your physician    Dr Marilyn Rockwell will see you next at your ablation appointment on February 8. What to do at Home:  Recommended activity: No driving today    The discharge information has been reviewed with the PATIENT . The PATIENT  verbalized understanding.

## 2022-01-18 NOTE — PROGRESS NOTES
Pt sedated with 4mg Versed and 50mcg Fentanyl for Cardioversion, given 1 synchronized shock(s) at 300 Joules, AFib converted to NSR/Sinus Javier Escobar. (monitored sedation from 0914 to 0925)

## 2022-01-18 NOTE — PROGRESS NOTES
Patient arrived to Non-Invasive Cardiology Lab for Out Patient [facility] Procedure. Staff introduced to patient. Patient identifiers verified with Name and Date of Birth. Procedure verified with patient. Consent forms reviewed and signed by patient or authorized representative and verified. Allergies verified. Patient informed of procedure and plan of care. Questions answered with review. Patient on cardiac monitor, non-invasive blood pressure, SPO2 monitor. On 2L NC O2. Patient is A&Ox3. Patient reports no complaints. Patient on stretcher, in low position, with side rails up. Patient instructed to call for assistance as needed. Family in waiting room.

## 2022-02-04 ENCOUNTER — HOSPITAL ENCOUNTER (OUTPATIENT)
Dept: PREADMISSION TESTING | Age: 77
Discharge: HOME | End: 2022-02-04
Payer: MEDICARE

## 2022-02-04 LAB
SARS-COV-2, XPLCVT: NOT DETECTED
SOURCE, COVRS: NORMAL

## 2022-02-04 PROCEDURE — U0005 INFEC AGEN DETEC AMPLI PROBE: HCPCS

## 2022-02-08 ENCOUNTER — APPOINTMENT (OUTPATIENT)
Dept: CARDIAC CATH/INVASIVE PROCEDURES | Age: 77
End: 2022-02-08
Attending: INTERNAL MEDICINE
Payer: MEDICARE

## 2022-02-08 ENCOUNTER — ANESTHESIA (OUTPATIENT)
Dept: CARDIAC CATH/INVASIVE PROCEDURES | Age: 77
End: 2022-02-08
Payer: MEDICARE

## 2022-02-08 ENCOUNTER — HOSPITAL ENCOUNTER (OUTPATIENT)
Age: 77
Discharge: HOME | End: 2022-02-09
Attending: INTERNAL MEDICINE | Admitting: INTERNAL MEDICINE
Payer: MEDICARE

## 2022-02-08 ENCOUNTER — ANESTHESIA EVENT (OUTPATIENT)
Dept: CARDIAC CATH/INVASIVE PROCEDURES | Age: 77
End: 2022-02-08
Payer: MEDICARE

## 2022-02-08 DIAGNOSIS — I48.91 ATRIAL FIBRILLATION, UNSPECIFIED TYPE (HCC): ICD-10-CM

## 2022-02-08 PROCEDURE — C1730 CATH, EP, 19 OR FEW ELECT: HCPCS | Performed by: INTERNAL MEDICINE

## 2022-02-08 PROCEDURE — 93621 COMP EP EVL L PAC&REC C SINS: CPT | Performed by: INTERNAL MEDICINE

## 2022-02-08 PROCEDURE — 77030041009 HC ADTR CBL EP DX J&J -D: Performed by: INTERNAL MEDICINE

## 2022-02-08 PROCEDURE — C1759 CATH, INTRA ECHOCARDIOGRAPHY: HCPCS | Performed by: INTERNAL MEDICINE

## 2022-02-08 PROCEDURE — 93656 COMPRE EP EVAL ABLTJ ATR FIB: CPT | Performed by: INTERNAL MEDICINE

## 2022-02-08 PROCEDURE — 76060000036 HC ANESTHESIA 2.5 TO 3 HR: Performed by: INTERNAL MEDICINE

## 2022-02-08 PROCEDURE — 76210000006 HC OR PH I REC 0.5 TO 1 HR

## 2022-02-08 PROCEDURE — 77030010880 HC CBL EP SUPRME STJU -C: Performed by: INTERNAL MEDICINE

## 2022-02-08 PROCEDURE — 74011250637 HC RX REV CODE- 250/637: Performed by: NURSE PRACTITIONER

## 2022-02-08 PROCEDURE — 74011250636 HC RX REV CODE- 250/636: Performed by: INTERNAL MEDICINE

## 2022-02-08 PROCEDURE — 77030013797 HC KT TRNSDUC PRSSR EDWD -A: Performed by: INTERNAL MEDICINE

## 2022-02-08 PROCEDURE — C1893 INTRO/SHEATH, FIXED,NON-PEEL: HCPCS | Performed by: INTERNAL MEDICINE

## 2022-02-08 PROCEDURE — 77030029359 HC PRB ESOPH TEMP CATH ANTM -F: Performed by: INTERNAL MEDICINE

## 2022-02-08 PROCEDURE — 74011000250 HC RX REV CODE- 250: Performed by: NURSE PRACTITIONER

## 2022-02-08 PROCEDURE — 77030008684 HC TU ET CUF COVD -B: Performed by: NURSE ANESTHETIST, CERTIFIED REGISTERED

## 2022-02-08 PROCEDURE — 77030027107 HC PTCH EXT REF CARTO3 J&J -F: Performed by: INTERNAL MEDICINE

## 2022-02-08 PROCEDURE — C1894 INTRO/SHEATH, NON-LASER: HCPCS | Performed by: INTERNAL MEDICINE

## 2022-02-08 PROCEDURE — 74011000258 HC RX REV CODE- 258: Performed by: NURSE ANESTHETIST, CERTIFIED REGISTERED

## 2022-02-08 PROCEDURE — 77030013079 HC BLNKT BAIR HGGR 3M -A: Performed by: NURSE ANESTHETIST, CERTIFIED REGISTERED

## 2022-02-08 PROCEDURE — 74011250637 HC RX REV CODE- 250/637: Performed by: INTERNAL MEDICINE

## 2022-02-08 PROCEDURE — 74011250636 HC RX REV CODE- 250/636: Performed by: NURSE ANESTHETIST, CERTIFIED REGISTERED

## 2022-02-08 PROCEDURE — 77030003700 HC NDL TSEPTL STJU -C: Performed by: INTERNAL MEDICINE

## 2022-02-08 PROCEDURE — 85347 COAGULATION TIME ACTIVATED: CPT

## 2022-02-08 PROCEDURE — 77030026438 HC STYL ET INTUB CARD -A: Performed by: NURSE ANESTHETIST, CERTIFIED REGISTERED

## 2022-02-08 PROCEDURE — 77030005513 HC CATH URETH FOL11 MDII -B: Performed by: INTERNAL MEDICINE

## 2022-02-08 PROCEDURE — C1732 CATH, EP, DIAG/ABL, 3D/VECT: HCPCS | Performed by: INTERNAL MEDICINE

## 2022-02-08 PROCEDURE — 77030035291 HC TBNG PMP SMARTABLATE J&J -B: Performed by: INTERNAL MEDICINE

## 2022-02-08 PROCEDURE — 77030018729 HC ELECTRD DEFIB PAD CARD -B: Performed by: INTERNAL MEDICINE

## 2022-02-08 PROCEDURE — C1760 CLOSURE DEV, VASC: HCPCS | Performed by: INTERNAL MEDICINE

## 2022-02-08 PROCEDURE — 74011000250 HC RX REV CODE- 250: Performed by: NURSE ANESTHETIST, CERTIFIED REGISTERED

## 2022-02-08 PROCEDURE — 2709999900 HC NON-CHARGEABLE SUPPLY: Performed by: INTERNAL MEDICINE

## 2022-02-08 RX ORDER — HEPARIN SODIUM 1000 [USP'U]/ML
INJECTION, SOLUTION INTRAVENOUS; SUBCUTANEOUS AS NEEDED
Status: DISCONTINUED | OUTPATIENT
Start: 2022-02-08 | End: 2022-02-08 | Stop reason: HOSPADM

## 2022-02-08 RX ORDER — PROPOFOL 10 MG/ML
INJECTION, EMULSION INTRAVENOUS AS NEEDED
Status: DISCONTINUED | OUTPATIENT
Start: 2022-02-08 | End: 2022-02-08 | Stop reason: HOSPADM

## 2022-02-08 RX ORDER — DEXAMETHASONE SODIUM PHOSPHATE 4 MG/ML
INJECTION, SOLUTION INTRA-ARTICULAR; INTRALESIONAL; INTRAMUSCULAR; INTRAVENOUS; SOFT TISSUE AS NEEDED
Status: DISCONTINUED | OUTPATIENT
Start: 2022-02-08 | End: 2022-02-08 | Stop reason: HOSPADM

## 2022-02-08 RX ORDER — SODIUM CHLORIDE 0.9 % (FLUSH) 0.9 %
5-40 SYRINGE (ML) INJECTION AS NEEDED
Status: DISCONTINUED | OUTPATIENT
Start: 2022-02-08 | End: 2022-02-09 | Stop reason: HOSPADM

## 2022-02-08 RX ORDER — METHOCARBAMOL 500 MG/1
500 TABLET, FILM COATED ORAL
COMMUNITY

## 2022-02-08 RX ORDER — HYDROMORPHONE HYDROCHLORIDE 2 MG/ML
INJECTION, SOLUTION INTRAMUSCULAR; INTRAVENOUS; SUBCUTANEOUS AS NEEDED
Status: DISCONTINUED | OUTPATIENT
Start: 2022-02-08 | End: 2022-02-08 | Stop reason: HOSPADM

## 2022-02-08 RX ORDER — PROPOFOL 10 MG/ML
INJECTION, EMULSION INTRAVENOUS
Status: DISCONTINUED | OUTPATIENT
Start: 2022-02-08 | End: 2022-02-08 | Stop reason: HOSPADM

## 2022-02-08 RX ORDER — LIDOCAINE HYDROCHLORIDE 10 MG/ML
0.1 INJECTION, SOLUTION EPIDURAL; INFILTRATION; INTRACAUDAL; PERINEURAL AS NEEDED
Status: CANCELLED | OUTPATIENT
Start: 2022-02-08

## 2022-02-08 RX ORDER — FENTANYL CITRATE 50 UG/ML
50 INJECTION, SOLUTION INTRAMUSCULAR; INTRAVENOUS AS NEEDED
Status: CANCELLED | OUTPATIENT
Start: 2022-02-08

## 2022-02-08 RX ORDER — SUCCINYLCHOLINE CHLORIDE 20 MG/ML
INJECTION INTRAMUSCULAR; INTRAVENOUS AS NEEDED
Status: DISCONTINUED | OUTPATIENT
Start: 2022-02-08 | End: 2022-02-08 | Stop reason: HOSPADM

## 2022-02-08 RX ORDER — AMLODIPINE BESYLATE 5 MG/1
5 TABLET ORAL DAILY
Status: DISCONTINUED | OUTPATIENT
Start: 2022-02-09 | End: 2022-02-09 | Stop reason: HOSPADM

## 2022-02-08 RX ORDER — ONDANSETRON 2 MG/ML
4 INJECTION INTRAMUSCULAR; INTRAVENOUS AS NEEDED
Status: DISCONTINUED | OUTPATIENT
Start: 2022-02-08 | End: 2022-02-08 | Stop reason: HOSPADM

## 2022-02-08 RX ORDER — ACETAMINOPHEN 325 MG/1
TABLET ORAL
Status: DISPENSED
Start: 2022-02-08 | End: 2022-02-09

## 2022-02-08 RX ORDER — FENTANYL CITRATE 50 UG/ML
25 INJECTION, SOLUTION INTRAMUSCULAR; INTRAVENOUS
Status: DISCONTINUED | OUTPATIENT
Start: 2022-02-08 | End: 2022-02-08 | Stop reason: HOSPADM

## 2022-02-08 RX ORDER — HYDROMORPHONE HYDROCHLORIDE 1 MG/ML
.2-.5 INJECTION, SOLUTION INTRAMUSCULAR; INTRAVENOUS; SUBCUTANEOUS
Status: DISCONTINUED | OUTPATIENT
Start: 2022-02-08 | End: 2022-02-08 | Stop reason: HOSPADM

## 2022-02-08 RX ORDER — ROCURONIUM BROMIDE 10 MG/ML
INJECTION, SOLUTION INTRAVENOUS AS NEEDED
Status: DISCONTINUED | OUTPATIENT
Start: 2022-02-08 | End: 2022-02-08 | Stop reason: HOSPADM

## 2022-02-08 RX ORDER — SODIUM CHLORIDE 0.9 % (FLUSH) 0.9 %
5-40 SYRINGE (ML) INJECTION EVERY 8 HOURS
Status: DISCONTINUED | OUTPATIENT
Start: 2022-02-08 | End: 2022-02-09 | Stop reason: HOSPADM

## 2022-02-08 RX ORDER — ZOLPIDEM TARTRATE 5 MG/1
5 TABLET ORAL
Status: DISCONTINUED | OUTPATIENT
Start: 2022-02-08 | End: 2022-02-09 | Stop reason: HOSPADM

## 2022-02-08 RX ORDER — SODIUM CHLORIDE, SODIUM LACTATE, POTASSIUM CHLORIDE, CALCIUM CHLORIDE 600; 310; 30; 20 MG/100ML; MG/100ML; MG/100ML; MG/100ML
25 INJECTION, SOLUTION INTRAVENOUS CONTINUOUS
Status: CANCELLED | OUTPATIENT
Start: 2022-02-08 | End: 2022-02-09

## 2022-02-08 RX ORDER — ATORVASTATIN CALCIUM 10 MG/1
10 TABLET, FILM COATED ORAL
Status: DISCONTINUED | OUTPATIENT
Start: 2022-02-08 | End: 2022-02-09 | Stop reason: HOSPADM

## 2022-02-08 RX ORDER — ONDANSETRON 2 MG/ML
INJECTION INTRAMUSCULAR; INTRAVENOUS AS NEEDED
Status: DISCONTINUED | OUTPATIENT
Start: 2022-02-08 | End: 2022-02-08 | Stop reason: HOSPADM

## 2022-02-08 RX ORDER — SODIUM CHLORIDE, SODIUM LACTATE, POTASSIUM CHLORIDE, CALCIUM CHLORIDE 600; 310; 30; 20 MG/100ML; MG/100ML; MG/100ML; MG/100ML
25 INJECTION, SOLUTION INTRAVENOUS CONTINUOUS
Status: DISCONTINUED | OUTPATIENT
Start: 2022-02-08 | End: 2022-02-08 | Stop reason: HOSPADM

## 2022-02-08 RX ORDER — DOBUTAMINE HYDROCHLORIDE 200 MG/100ML
INJECTION INTRAVENOUS
Status: DISCONTINUED | OUTPATIENT
Start: 2022-02-08 | End: 2022-02-08 | Stop reason: HOSPADM

## 2022-02-08 RX ORDER — LIDOCAINE HYDROCHLORIDE 20 MG/ML
INJECTION, SOLUTION EPIDURAL; INFILTRATION; INTRACAUDAL; PERINEURAL AS NEEDED
Status: DISCONTINUED | OUTPATIENT
Start: 2022-02-08 | End: 2022-02-08 | Stop reason: HOSPADM

## 2022-02-08 RX ORDER — ACETAMINOPHEN 325 MG/1
650 TABLET ORAL
Status: DISCONTINUED | OUTPATIENT
Start: 2022-02-08 | End: 2022-02-09 | Stop reason: HOSPADM

## 2022-02-08 RX ORDER — FENTANYL CITRATE 50 UG/ML
INJECTION, SOLUTION INTRAMUSCULAR; INTRAVENOUS AS NEEDED
Status: DISCONTINUED | OUTPATIENT
Start: 2022-02-08 | End: 2022-02-08 | Stop reason: HOSPADM

## 2022-02-08 RX ORDER — SODIUM CHLORIDE 9 MG/ML
INJECTION, SOLUTION INTRAVENOUS
Status: DISCONTINUED | OUTPATIENT
Start: 2022-02-08 | End: 2022-02-08 | Stop reason: HOSPADM

## 2022-02-08 RX ORDER — CETIRIZINE HCL 10 MG
10 TABLET ORAL
COMMUNITY

## 2022-02-08 RX ORDER — PROTAMINE SULFATE 10 MG/ML
INJECTION, SOLUTION INTRAVENOUS AS NEEDED
Status: DISCONTINUED | OUTPATIENT
Start: 2022-02-08 | End: 2022-02-08 | Stop reason: HOSPADM

## 2022-02-08 RX ORDER — MIDAZOLAM HYDROCHLORIDE 1 MG/ML
1 INJECTION, SOLUTION INTRAMUSCULAR; INTRAVENOUS AS NEEDED
Status: CANCELLED | OUTPATIENT
Start: 2022-02-08

## 2022-02-08 RX ORDER — FUROSEMIDE 20 MG/1
20 TABLET ORAL DAILY
Status: DISCONTINUED | OUTPATIENT
Start: 2022-02-09 | End: 2022-02-09 | Stop reason: HOSPADM

## 2022-02-08 RX ORDER — HEPARIN SODIUM 200 [USP'U]/100ML
INJECTION, SOLUTION INTRAVENOUS
Status: COMPLETED | OUTPATIENT
Start: 2022-02-08 | End: 2022-02-08

## 2022-02-08 RX ADMIN — PROTAMINE SULFATE 20 MG: 10 INJECTION, SOLUTION INTRAVENOUS at 11:39

## 2022-02-08 RX ADMIN — ATORVASTATIN CALCIUM 10 MG: 10 TABLET, FILM COATED ORAL at 20:20

## 2022-02-08 RX ADMIN — FENTANYL CITRATE 50 MCG: 50 INJECTION, SOLUTION INTRAMUSCULAR; INTRAVENOUS at 09:36

## 2022-02-08 RX ADMIN — PROPOFOL 100 MG: 10 INJECTION, EMULSION INTRAVENOUS at 09:39

## 2022-02-08 RX ADMIN — Medication 5 MCG/KG/MIN: at 10:10

## 2022-02-08 RX ADMIN — PROPOFOL 150 MCG/KG/MIN: 10 INJECTION, EMULSION INTRAVENOUS at 11:04

## 2022-02-08 RX ADMIN — SODIUM CHLORIDE: 9 INJECTION, SOLUTION INTRAVENOUS at 09:33

## 2022-02-08 RX ADMIN — ACETAMINOPHEN 650 MG: 325 TABLET ORAL at 14:19

## 2022-02-08 RX ADMIN — PHENYLEPHRINE HYDROCHLORIDE 50 MCG/MIN: 10 INJECTION INTRAVENOUS at 10:00

## 2022-02-08 RX ADMIN — DEXAMETHASONE SODIUM PHOSPHATE 4 MG: 4 INJECTION, SOLUTION INTRAMUSCULAR; INTRAVENOUS at 09:56

## 2022-02-08 RX ADMIN — PROTAMINE SULFATE 20 MG: 10 INJECTION, SOLUTION INTRAVENOUS at 11:35

## 2022-02-08 RX ADMIN — ONDANSETRON HYDROCHLORIDE 4 MG: 2 INJECTION, SOLUTION INTRAMUSCULAR; INTRAVENOUS at 11:33

## 2022-02-08 RX ADMIN — HEPARIN SODIUM 12000 UNITS: 1000 INJECTION, SOLUTION INTRAVENOUS; SUBCUTANEOUS at 10:04

## 2022-02-08 RX ADMIN — HYDROMORPHONE HYDROCHLORIDE 0.5 MG: 2 INJECTION, SOLUTION INTRAMUSCULAR; INTRAVENOUS; SUBCUTANEOUS at 10:33

## 2022-02-08 RX ADMIN — APIXABAN 2.5 MG: 2.5 TABLET, FILM COATED ORAL at 14:19

## 2022-02-08 RX ADMIN — SUCCINYLCHOLINE CHLORIDE 120 MG: 20 INJECTION, SOLUTION INTRAMUSCULAR; INTRAVENOUS at 09:39

## 2022-02-08 RX ADMIN — PROPOFOL 20 MG: 10 INJECTION, EMULSION INTRAVENOUS at 10:30

## 2022-02-08 RX ADMIN — ROCURONIUM BROMIDE 5 MG: 10 INJECTION INTRAVENOUS at 09:39

## 2022-02-08 RX ADMIN — FENTANYL CITRATE 50 MCG: 50 INJECTION, SOLUTION INTRAMUSCULAR; INTRAVENOUS at 09:33

## 2022-02-08 RX ADMIN — ZOLPIDEM TARTRATE 5 MG: 5 TABLET ORAL at 22:58

## 2022-02-08 RX ADMIN — ACETAMINOPHEN 650 MG: 325 TABLET ORAL at 20:20

## 2022-02-08 RX ADMIN — PROPOFOL 50 MG: 10 INJECTION, EMULSION INTRAVENOUS at 09:48

## 2022-02-08 RX ADMIN — LIDOCAINE HYDROCHLORIDE 100 MG: 20 INJECTION, SOLUTION INTRAVENOUS at 09:39

## 2022-02-08 RX ADMIN — PROTAMINE SULFATE 30 MG: 10 INJECTION, SOLUTION INTRAVENOUS at 11:32

## 2022-02-08 RX ADMIN — PROPOFOL 150 MCG/KG/MIN: 10 INJECTION, EMULSION INTRAVENOUS at 09:39

## 2022-02-08 RX ADMIN — HEPARIN SODIUM 3000 UNITS: 1000 INJECTION, SOLUTION INTRAVENOUS; SUBCUTANEOUS at 10:54

## 2022-02-08 RX ADMIN — SODIUM CHLORIDE, PRESERVATIVE FREE 10 ML: 5 INJECTION INTRAVENOUS at 22:58

## 2022-02-08 NOTE — ANESTHESIA PREPROCEDURE EVALUATION
Relevant Problems   ENDOCRINE   (+) Unspecified hypothyroidism       Anesthetic History   No history of anesthetic complications            Review of Systems / Medical History  Patient summary reviewed, nursing notes reviewed and pertinent labs reviewed    Pulmonary        Sleep apnea: CPAP           Neuro/Psych       CVA (s/p AVR, no recurrence)       Cardiovascular    Hypertension  Valvular problems/murmurs (s/p AVR)      Dysrhythmias : atrial fibrillation      Exercise tolerance: >4 METS     GI/Hepatic/Renal     GERD           Endo/Other      Hypothyroidism  Arthritis     Other Findings              Physical Exam    Airway  Mallampati: II  TM Distance: 4 - 6 cm  Neck ROM: normal range of motion   Mouth opening: Normal     Cardiovascular    Rhythm: regular  Rate: normal         Dental    Dentition: Upper partial plate     Pulmonary  Breath sounds clear to auscultation               Abdominal  GI exam deferred       Other Findings            Anesthetic Plan    ASA: 3  Anesthesia type: general          Induction: Intravenous  Anesthetic plan and risks discussed with: Patient

## 2022-02-08 NOTE — Clinical Note
TRANSFER - IN REPORT:     Verbal report received from: Dana Hooker RN. Report consisted of patient's Situation, Background, Assessment and   Recommendations(SBAR). Opportunity for questions and clarification was provided. Assessment completed upon patient's arrival to unit and care assumed. Patient transported with a Registered Nurse.

## 2022-02-08 NOTE — PERIOP NOTES
Handoff Report from Operating Room to PACU    Report received from Telma Muñoz and Vickie Byrd CRNA regarding Kassandra Baltazar. Surgeon(s):  Anesthesia, Case  And Procedure(s) (LRB):  SPECIAL PROCEDURE OUTSIDE OF OR (N/A)  confirmed   with dressings discussed. Anesthesia type, drugs, patient history, complications, estimated blood loss, vital signs, intake and output, and last pain medication, lines and temperature were reviewed.

## 2022-02-08 NOTE — Clinical Note
Sheath #1: sheath exchanged for SHEATH GUID 11.5X8. 5FR L71MM M CRV L22MM BIDIR STEER CARTO. Hemostasis achieved.  Sheath aspirated and flushed

## 2022-02-08 NOTE — ANESTHESIA POSTPROCEDURE EVALUATION
Post-Anesthesia Evaluation and Assessment    Patient: Zuhair Varela MRN: [id number]  SSN: [id number]    YOB: 1945  Age: 64 y.o. Sex: female      I have evaluated the patient and they are stable and ready for discharge from the PACU. Cardiovascular Function/Vital Signs  Visit Vitals  BP (!) 136/53   Pulse 60   Temp 36.9 °C (98.5 °F)   Resp 19   SpO2 98%       Patient is status post General anesthesia for Procedure(s):  ABLATION A-FIB  W COMPLETE EP STUDY. Nausea/Vomiting: None    Postoperative hydration reviewed and adequate. Pain:  Pain Scale 1: Numeric (0 - 10) (02/08/22 1230)  Pain Intensity 1: 0 (02/08/22 1230)   Managed    Neurological Status:   Neuro (WDL): Exceptions to WDL (02/08/22 1201)  Neuro  Neurologic State: Drowsy; Eyes open spontaneously (02/08/22 1201)  Orientation Level: Oriented to person;Oriented to place;Oriented to situation (02/08/22 1201)  Cognition: Follows commands (02/08/22 1201)  Speech: Clear (02/08/22 1201)  LUE Motor Response: Purposeful (02/08/22 1201)  LLE Motor Response: Purposeful (02/08/22 1201)  RUE Motor Response: Purposeful (02/08/22 1201)  RLE Motor Response: Purposeful (02/08/22 1201)   At baseline    Mental Status, Level of Consciousness: Alert and  oriented to person, place, and time    Pulmonary Status:   O2 Device: Nasal cannula (02/08/22 1201)   Adequate oxygenation and airway patent    Complications related to anesthesia: None    Post-anesthesia assessment completed. No concerns    Signed By: Andrzej Jiménez MD     February 8, 2022              Procedure(s):  ABLATION A-FIB  W COMPLETE EP STUDY. general    <BSHSIANPOST>    INITIAL Post-op Vital signs:   Vitals Value Taken Time   /53 02/08/22 1230   Temp 36.9 °C (98.5 °F) 02/08/22 1201   Pulse 60 02/08/22 1234   Resp 12 02/08/22 1234   SpO2 96 % 02/08/22 1234   Vitals shown include unvalidated device data.

## 2022-02-08 NOTE — PERIOP NOTES
1152- Handoff Report from Operating Room to PACU    Report received from [facility] and [address] regarding Rashard King. Surgeon(s):  Anesthesia, Case  And Procedure(s) (LRB):  SPECIAL PROCEDURE OUTSIDE OF OR (N/A)  confirmed   with allergies, drains and dressings discussed. Anesthesia type, drugs, patient history, complications, estimated blood loss, vital signs, intake and output, and last pain medication, lines, reversal medications and temperature were reviewed.

## 2022-02-08 NOTE — H&P
EP/ ARRHYTHMIA    Patient ID:  Patient: Jeyson Castro  MRN: [id number]  Age: 64 y.o.  : 1945    Date of  Admission: 2022  8:23 AM   PCP:  Osmin Manjarrez MD    Assessment: 1. Paroxysmal atrial fibrillation but also a persistent episode in 2021 leading to cardioversion. Plan:     1. Given the potential benefits, risks, and alternatives, she agrees to proceed with Afib ablation. Jeyson Castro is a 64 y.o. female with a history of the above here for her procedure.        Past Medical History:   Diagnosis Date    Aortic stenosis     secondary to rheumatic fever as a child, s/p AVR with porcine valve 10/09    Arrhythmia 2021    afib/flutter    Arrhythmia 2020    SVT    Arthritis     generalized    Diverticulosis     GERD (gastroesophageal reflux disease)     Heart failure ([facility] [address]     21 CHF    Ill-defined condition     diverticulosis    Other and unspecified hyperlipidemia     Sleep apnea     BiPAP    Stroke ([facility] [address]     5 days post post AOVR; no recurrence    Unspecified essential hypertension     Unspecified hypothyroidism         Past Surgical History:   Procedure Laterality Date    DELIVERY       x3    FOOT/TOES SURGERY PROC UNLISTED      bilateral    HX AORTIC VALVE REPLACEMENT      10/09    HX APPENDECTOMY      HX CATARACT REMOVAL      bilateral and lens implant    HX GYN      3 c section    HX HYSTERECTOMY      HX ORTHOPAEDIC      tendon realigment, bunionectomy    HX ORTHOPAEDIC  14    RIGHT TOTAL KNEE ARTHROPLASTY        Social History     Tobacco Use    Smoking status: Never Smoker    Smokeless tobacco: Never Used   Substance Use Topics    Alcohol use: Yes     Comment: glass of wine every few months        Family History   Problem Relation Age of Onset    Thyroid Disease Sister    [facility] Other Other         brain aneurysm in father, paternal aunt and uncle and sister        Allergies   Allergen Reactions    Penicillins Swelling     Difficulty breatheing    Erythromycin Nausea and Vomiting          No current facility-administered medications for this encounter. Review of Symptoms:  See OV. Objective:      Physical Exam:  Temp (24hrs), Av.6 °F (36.4 °C), Min:97.6 °F (36.4 °C), Max:97.6 °F (36.4 °C)    Patient Vitals for the past 8 hrs:   Pulse   22 0915 (!) 58    Patient Vitals for the past 8 hrs:   Resp   22 0915 18    Patient Vitals for the past 8 hrs:   BP   22 0915 (!) 155/64      No intake or output data in the 24 hours ending 22    Nondiaphoretic, not in acute distress. Neuro grossly nonfocal.  No tremor. Awake and appropriate.     CARDIOGRAPHICS and STUDIES, I reviewed:    Telemetry:  Marianne Farooq MD  2022

## 2022-02-08 NOTE — PERIOP NOTES
TRANSFER - OUT REPORT:    Verbal report given to Gumaro Glez RN(name) on Emi Dempsey  being transferred to 2154(unit) for routine post - op       Report consisted of patients Situation, Background, Assessment and   Recommendations(SBAR). Information from the following report(s) OR Summary, Procedure Summary, Intake/Output and MAR was reviewed with the receiving nurse. Opportunity for questions and clarification was provided.       Patient transported with:   Monitor  O2 @ 2 liters  Registered Nurse  [facility]

## 2022-02-08 NOTE — Clinical Note
Sheath #1: sheath exchanged for [facility] L10CM ID11FR GWIRE OD0.038IN SMOOTH. Hemostasis achieved.  Vizigo sheath removed/ 11fr sheath advanced RFV/ aspirated and flushed

## 2022-02-08 NOTE — Clinical Note
Sheath #all: Closed using Vascade and manual compression. Site secured by Tegaderm. Pressure held for: 10 minutes.

## 2022-02-08 NOTE — Clinical Note
TRANSFER - OUT REPORT:     Verbal report given to: MELISSA Tomlinson. Report consisted of patient's Situation, Background, Assessment and   Recommendations(SBAR). Opportunity for questions and clarification was provided. Patient transported with a Registered Nurse, Monitor and Oxygen. Oxygen used for patient = nasal cannula, @ 2 - 6 Liters. Patient transported to: PACU.

## 2022-02-08 NOTE — Clinical Note
Left femoral vein. Accessed successfully. Using fluoro guidance. Number of attempts =  1.  Sheath wire advanced

## 2022-02-08 NOTE — PROGRESS NOTES
1253: Pt arrived from CCL. HOB flat. Bilateral groins CDI, no hematoma. 1305: HOB elevated to 30 degrees. Bilateral groins CDI, no hematoma.

## 2022-02-09 VITALS
HEART RATE: 79 BPM | RESPIRATION RATE: 18 BRPM | SYSTOLIC BLOOD PRESSURE: 141 MMHG | TEMPERATURE: 98.3 F | OXYGEN SATURATION: 95 % | DIASTOLIC BLOOD PRESSURE: 75 MMHG

## 2022-02-09 LAB
ACT BLD: 279 SECS (ref 79–138)
ACT BLD: 297 SECS (ref 79–138)
ACT BLD: 309 SECS (ref 79–138)
ATRIAL RATE: 70 BPM
CALCULATED P AXIS, ECG09: 75 DEGREES
CALCULATED R AXIS, ECG10: 66 DEGREES
CALCULATED T AXIS, ECG11: 69 DEGREES
DIAGNOSIS, 93000: NORMAL
P-R INTERVAL, ECG05: 152 MS
Q-T INTERVAL, ECG07: 398 MS
QRS DURATION, ECG06: 92 MS
QTC CALCULATION (BEZET), ECG08: 429 MS
VENTRICULAR RATE, ECG03: 70 BPM

## 2022-02-09 PROCEDURE — 74011250637 HC RX REV CODE- 250/637: Performed by: NURSE PRACTITIONER

## 2022-02-09 PROCEDURE — 74011000250 HC RX REV CODE- 250: Performed by: NURSE PRACTITIONER

## 2022-02-09 PROCEDURE — 93005 ELECTROCARDIOGRAM TRACING: CPT

## 2022-02-09 RX ORDER — SUCRALFATE 1 G/1
1 TABLET ORAL 3 TIMES DAILY
Qty: 90 TABLET | Refills: 0 | Status: SHIPPED | OUTPATIENT
Start: 2022-02-09 | End: 2022-09-26

## 2022-02-09 RX ADMIN — FUROSEMIDE 20 MG: 20 TABLET ORAL at 08:08

## 2022-02-09 RX ADMIN — APIXABAN 5 MG: 5 TABLET, FILM COATED ORAL at 08:08

## 2022-02-09 RX ADMIN — AMLODIPINE BESYLATE 5 MG: 5 TABLET ORAL at 08:08

## 2022-02-09 RX ADMIN — SODIUM CHLORIDE, PRESERVATIVE FREE 10 ML: 5 INJECTION INTRAVENOUS at 06:52

## 2022-02-09 NOTE — DISCHARGE INSTRUCTIONS
POST-ABLATION DISCHARGE INSTRUCTIONS:    You had an atrial fibrillation ablation by Dr. Tiny Nielsen on 2/8 with Dr. Tiny Nielsen using radiofrequency energy. Please stop flecainide, take your usual medications otherwise. There is a new medication called carafate that you'll take 3x daily as a SLURRY for one month. Call the office to make a follow-up appointment in 2-4 weeks [phone]. Do not drive, operate any machinery, or sign any legal documents for 24 hours after your procedure. You must have someone to drive you home. You may take a shower 24 hours after your cardiac procedure. Be sure to get the dressing wet and then remove it; gently wash the area with warm soapy water. Pat dry and leave open to air. To help prevent infections, be sure to keep the cath site clean and dry. No lotions, creams, powders, ointments, etc. in the cath site for approximately 1 week.  Do not take a tub bath, get in a hot tub or swimming pool for approximately 5 days or until the cath site is completely healed.  No strenuous activity or heavy lifting over 20 lbs. for 7 days.  After your procedure, some bruising or discomfort is common during the healing process. Tylenol, 1-2 tablets every 6 hours as needed, is recommended if you experience any discomfort. If you experience any signs or symptoms of infection such as fever, chills, or poorly healing incision, persistent tenderness or swelling in the groin, redness and/or warmth to the touch, numbness, significant tingling or pain at the groin site or affected extremity, rash, drainage from the site, or if the leg feels tight or swollen, call your physician right away.  If bleeding at the site occurs, take a clean gauze pad and apply direct pressure to the groin just above the puncture site, and call your physician right away.      If your procedure involved ablation therapy, you may feel some mild or vague chest discomfort due to delivery of heat therapy to the heart muscle. This should resolve in 1-2 days. If it gets worse or is associated with shortness of breath, dizziness, loss of consciousness, call your physician right away or call 911 if emergency medical care is needed.       Signed By: Jose Armando Bryant MD     February 9, 2022

## 2022-02-09 NOTE — PROGRESS NOTES
Brief Procedure Note    Patient: Erik Gaffney MRN: [id number]  SSN: [id number]    YOB: 1945  Age: 64 y.o. Sex: female      Date of Procedure: 2/8/2022     Pre-procedure Diagnosis: Atrial fibrillation    Post-procedure Diagnosis: Atrial fibrillation    Procedure: Ablation of atrial fibrillation using RF    Performed By: Oni Abdi MD     Anesthesia: General anesthesia    Estimated Blood Loss: Less than 50 mL      Specimens:  None    Findings: see final note    Complications: None    Implants: None    Recommendations: Routine post-ablation follow-up.     Signed By: Oni Abdi MD     February 8, 2022

## 2022-02-09 NOTE — PROGRESS NOTES
S/p Afib ablation yesterday. Doing well. No issues. All questions answered for her including activity restrictions and medications. She is aware of s/sx warranting urgent medical F/U or calling 911. negative

## 2022-02-09 NOTE — PROGRESS NOTES
Problem: Falls - Risk of  Goal: *Absence of Falls  Description: Document Cherylle Cockayne Fall Risk and appropriate interventions in the flowsheet.   Outcome: Progressing Towards Goal  Note: Fall Risk Interventions:            Medication Interventions: Teach patient to arise slowly,Patient to call before getting OOB

## 2022-02-09 NOTE — PROGRESS NOTES
5238 - patient ambulating at baseline, vitals stable. Bilateral groin sites clean, dry, intact. Discharge and follow-up information given, site care and medication changes reviewed. Patient verbalized understanding, no questions at this time. Patient discharged to home with .

## 2022-02-11 LAB
ACT BLD: 279 SECS (ref 79–138)
ACT BLD: 297 SECS (ref 79–138)
ACT BLD: 309 SECS (ref 79–138)

## 2022-02-21 ENCOUNTER — HOSPITAL ENCOUNTER (OUTPATIENT)
Dept: GENERAL RADIOLOGY | Age: 77
Discharge: HOME | End: 2022-02-21
Payer: MEDICARE

## 2022-02-21 ENCOUNTER — TRANSCRIBE ORDER (OUTPATIENT)
Dept: REGISTRATION | Age: 77
End: 2022-02-21

## 2022-02-21 DIAGNOSIS — R05.9 COUGH: ICD-10-CM

## 2022-02-21 DIAGNOSIS — R05.9 COUGH: Primary | ICD-10-CM

## 2022-02-21 PROCEDURE — 71046 X-RAY EXAM CHEST 2 VIEWS: CPT

## 2022-03-18 PROBLEM — I48.91 ATRIAL FIBRILLATION (HCC): Status: ACTIVE | Noted: 2022-02-08

## 2022-05-10 ENCOUNTER — TRANSCRIBE ORDER (OUTPATIENT)
Dept: SCHEDULING | Age: 77
End: 2022-05-10

## 2022-05-10 DIAGNOSIS — R10.30 LOWER ABDOMINAL PAIN, UNSPECIFIED: Primary | ICD-10-CM

## 2022-05-10 DIAGNOSIS — K21.9 GASTROESOPHAGEAL REFLUX DISEASE: ICD-10-CM

## 2022-05-10 DIAGNOSIS — Z79.01 LONG TERM (CURRENT) USE OF ANTICOAGULANTS: ICD-10-CM

## 2022-05-10 DIAGNOSIS — I48.20 CHRONIC ATRIAL FIBRILLATION (HCC): ICD-10-CM

## 2022-05-20 ENCOUNTER — HOSPITAL ENCOUNTER (OUTPATIENT)
Dept: CT IMAGING | Age: 77
Discharge: HOME | End: 2022-05-20
Payer: MEDICARE

## 2022-05-20 DIAGNOSIS — K21.9 GASTROESOPHAGEAL REFLUX DISEASE: ICD-10-CM

## 2022-05-20 DIAGNOSIS — I48.20 CHRONIC ATRIAL FIBRILLATION (HCC): ICD-10-CM

## 2022-05-20 DIAGNOSIS — R10.30 LOWER ABDOMINAL PAIN, UNSPECIFIED: ICD-10-CM

## 2022-05-20 DIAGNOSIS — Z79.01 LONG TERM (CURRENT) USE OF ANTICOAGULANTS: ICD-10-CM

## 2022-05-20 LAB — CREAT BLD-MCNC: 1.1 MG/DL (ref 0.6–1.3)

## 2022-05-20 PROCEDURE — 74011000636 HC RX REV CODE- 636

## 2022-05-20 PROCEDURE — 74177 CT ABD & PELVIS W/CONTRAST: CPT

## 2022-05-20 PROCEDURE — 74011000250 HC RX REV CODE- 250

## 2022-05-20 PROCEDURE — 82565 ASSAY OF CREATININE: CPT

## 2022-05-20 RX ORDER — BARIUM SULFATE 20 MG/ML
900 SUSPENSION ORAL
Status: COMPLETED | OUTPATIENT
Start: 2022-05-20 | End: 2022-05-20

## 2022-05-20 RX ADMIN — BARIUM SULFATE 900 ML: 21 SUSPENSION ORAL at 13:34

## 2022-05-20 RX ADMIN — IOPAMIDOL 100 ML: 755 INJECTION, SOLUTION INTRAVENOUS at 13:34

## 2022-06-08 ENCOUNTER — TRANSCRIBE ORDER (OUTPATIENT)
Dept: SCHEDULING | Age: 77
End: 2022-06-08

## 2022-06-08 DIAGNOSIS — M43.12 ACQUIRED SPONDYLOLISTHESIS OF CERVICAL VERTEBRA: ICD-10-CM

## 2022-06-08 DIAGNOSIS — M50.30 DDD (DEGENERATIVE DISC DISEASE), CERVICAL: Primary | ICD-10-CM

## 2022-06-08 DIAGNOSIS — M54.2 CERVICALGIA: ICD-10-CM

## 2022-06-08 DIAGNOSIS — M47.812 OSTEOARTHRITIS OF CERVICAL SPINE, UNSPECIFIED SPINAL OSTEOARTHRITIS COMPLICATION STATUS: ICD-10-CM

## 2022-06-24 ENCOUNTER — HOSPITAL ENCOUNTER (OUTPATIENT)
Dept: MRI IMAGING | Age: 77
Discharge: HOME | End: 2022-06-24
Attending: NURSE PRACTITIONER
Payer: MEDICARE

## 2022-06-24 DIAGNOSIS — M54.2 CERVICALGIA: ICD-10-CM

## 2022-06-24 DIAGNOSIS — M47.812 OSTEOARTHRITIS OF CERVICAL SPINE, UNSPECIFIED SPINAL OSTEOARTHRITIS COMPLICATION STATUS: ICD-10-CM

## 2022-06-24 DIAGNOSIS — M50.30 DDD (DEGENERATIVE DISC DISEASE), CERVICAL: ICD-10-CM

## 2022-06-24 DIAGNOSIS — M43.12 ACQUIRED SPONDYLOLISTHESIS OF CERVICAL VERTEBRA: ICD-10-CM

## 2022-06-24 PROCEDURE — 72141 MRI NECK SPINE W/O DYE: CPT

## 2022-09-26 ENCOUNTER — HOSPITAL ENCOUNTER (OUTPATIENT)
Dept: PREADMISSION TESTING | Age: 77
Discharge: HOME | End: 2022-09-26
Payer: MEDICARE

## 2022-09-26 VITALS
BODY MASS INDEX: 29.86 KG/M2 | WEIGHT: 162.26 LBS | SYSTOLIC BLOOD PRESSURE: 161 MMHG | TEMPERATURE: 97.5 F | HEART RATE: 64 BPM | RESPIRATION RATE: 16 BRPM | HEIGHT: 62 IN | DIASTOLIC BLOOD PRESSURE: 62 MMHG

## 2022-09-26 LAB
ABO + RH BLD: NORMAL
ALBUMIN SERPL-MCNC: 3.7 G/DL (ref 3.5–5)
ALBUMIN/GLOB SERPL: 1.1 {RATIO} (ref 1.1–2.2)
ALP SERPL-CCNC: 94 U/L (ref 45–117)
ALT SERPL-CCNC: 20 U/L (ref 12–78)
ANION GAP SERPL CALC-SCNC: 6 MMOL/L (ref 5–15)
APPEARANCE UR: CLEAR
AST SERPL-CCNC: 17 U/L (ref 15–37)
BACTERIA URNS QL MICRO: NEGATIVE /HPF
BILIRUB SERPL-MCNC: 0.4 MG/DL (ref 0.2–1)
BILIRUB UR QL: NEGATIVE
BLOOD GROUP ANTIBODIES SERPL: NORMAL
BUN SERPL-MCNC: 19 MG/DL (ref 6–20)
BUN/CREAT SERPL: 15 (ref 12–20)
CALCIUM SERPL-MCNC: 9.1 MG/DL (ref 8.5–10.1)
CHLORIDE SERPL-SCNC: 98 MMOL/L (ref 97–108)
CO2 SERPL-SCNC: 26 MMOL/L (ref 21–32)
COLOR UR: ABNORMAL
CREAT SERPL-MCNC: 1.27 MG/DL (ref 0.55–1.02)
EPITH CASTS URNS QL MICRO: ABNORMAL /LPF
ERYTHROCYTE [DISTWIDTH] IN BLOOD BY AUTOMATED COUNT: 13.5 % (ref 11.5–14.5)
EST. AVERAGE GLUCOSE BLD GHB EST-MCNC: 126 MG/DL
GLOBULIN SER CALC-MCNC: 3.5 G/DL (ref 2–4)
GLUCOSE SERPL-MCNC: 108 MG/DL (ref 65–100)
GLUCOSE UR STRIP.AUTO-MCNC: NEGATIVE MG/DL
HBA1C MFR BLD: 6 % (ref 4–5.6)
HCT VFR BLD AUTO: 36.5 % (ref 35–47)
HGB BLD-MCNC: 12 G/DL (ref 11.5–16)
HGB UR QL STRIP: ABNORMAL
INR PPP: 1 (ref 0.9–1.1)
KETONES UR QL STRIP.AUTO: NEGATIVE MG/DL
LEUKOCYTE ESTERASE UR QL STRIP.AUTO: ABNORMAL
MCH RBC QN AUTO: 30 PG (ref 26–34)
MCHC RBC AUTO-ENTMCNC: 32.9 G/DL (ref 30–36.5)
MCV RBC AUTO: 91.3 FL (ref 80–99)
NITRITE UR QL STRIP.AUTO: NEGATIVE
NRBC # BLD: 0 K/UL (ref 0–0.01)
NRBC BLD-RTO: 0 PER 100 WBC
PH UR STRIP: 6 [PH] (ref 5–8)
PLATELET # BLD AUTO: 168 K/UL (ref 150–400)
PMV BLD AUTO: 11.1 FL (ref 8.9–12.9)
POTASSIUM SERPL-SCNC: 4.7 MMOL/L (ref 3.5–5.1)
PROT SERPL-MCNC: 7.2 G/DL (ref 6.4–8.2)
PROT UR STRIP-MCNC: NEGATIVE MG/DL
PROTHROMBIN TIME: 10.6 SEC (ref 9–11.1)
RBC # BLD AUTO: 4 M/UL (ref 3.8–5.2)
RBC #/AREA URNS HPF: ABNORMAL /HPF (ref 0–5)
SODIUM SERPL-SCNC: 130 MMOL/L (ref 136–145)
SP GR UR REFRACTOMETRY: 1 (ref 1–1.03)
SPECIMEN EXP DATE BLD: NORMAL
UA: UC IF INDICATED,UAUC: ABNORMAL
UROBILINOGEN UR QL STRIP.AUTO: 0.2 EU/DL (ref 0.2–1)
WBC # BLD AUTO: 5.4 K/UL (ref 3.6–11)
WBC URNS QL MICRO: ABNORMAL /HPF (ref 0–4)

## 2022-09-26 PROCEDURE — 85610 PROTHROMBIN TIME: CPT

## 2022-09-26 PROCEDURE — 36415 COLL VENOUS BLD VENIPUNCTURE: CPT

## 2022-09-26 PROCEDURE — 81001 URINALYSIS AUTO W/SCOPE: CPT

## 2022-09-26 PROCEDURE — 83036 HEMOGLOBIN GLYCOSYLATED A1C: CPT

## 2022-09-26 PROCEDURE — 86900 BLOOD TYPING SEROLOGIC ABO: CPT

## 2022-09-26 PROCEDURE — 80053 COMPREHEN METABOLIC PANEL: CPT

## 2022-09-26 PROCEDURE — 85027 COMPLETE CBC AUTOMATED: CPT

## 2022-09-26 RX ORDER — PREGABALIN 75 MG/1
75 CAPSULE ORAL ONCE
Status: CANCELLED | OUTPATIENT
Start: 2022-10-03 | End: 2022-10-03

## 2022-09-26 RX ORDER — LEVOTHYROXINE SODIUM 88 UG/1
44 TABLET ORAL
COMMUNITY

## 2022-09-26 RX ORDER — METOPROLOL SUCCINATE 25 MG/1
12.5 TABLET, EXTENDED RELEASE ORAL DAILY
COMMUNITY

## 2022-09-26 RX ORDER — OMEPRAZOLE 40 MG/1
40 CAPSULE, DELAYED RELEASE ORAL DAILY
COMMUNITY

## 2022-09-26 RX ORDER — CHOLECALCIFEROL (VITAMIN D3) 125 MCG
2000 CAPSULE ORAL DAILY
COMMUNITY

## 2022-09-26 RX ORDER — LEVOTHYROXINE SODIUM 88 UG/1
88 TABLET ORAL
COMMUNITY

## 2022-09-26 RX ORDER — ACETAMINOPHEN 500 MG
1000 TABLET ORAL ONCE
Status: CANCELLED | OUTPATIENT
Start: 2022-10-03 | End: 2022-10-03

## 2022-09-26 RX ORDER — CELECOXIB 200 MG/1
400 CAPSULE ORAL ONCE
Status: CANCELLED | OUTPATIENT
Start: 2022-10-03 | End: 2022-10-03

## 2022-09-26 RX ORDER — CELECOXIB 200 MG/1
200 CAPSULE ORAL DAILY
COMMUNITY

## 2022-09-26 RX ORDER — SODIUM CHLORIDE, SODIUM LACTATE, POTASSIUM CHLORIDE, CALCIUM CHLORIDE 600; 310; 30; 20 MG/100ML; MG/100ML; MG/100ML; MG/100ML
25 INJECTION, SOLUTION INTRAVENOUS CONTINUOUS
Status: CANCELLED | OUTPATIENT
Start: 2022-10-03

## 2022-09-26 RX ORDER — FUROSEMIDE 20 MG/1
40 TABLET ORAL DAILY
COMMUNITY

## 2022-09-26 NOTE — PERIOP NOTES

## 2022-09-26 NOTE — PERIOP NOTES
[facility]  Joint/Spine Preoperative Instructions        Surgery Date 10/3/22          Time of Arrival to be called at [phone]     1. On the day of your surgery, please report to the Surgical Services Registration Desk and sign in at your designated time. The Surgery Center is located to the right of the Emergency Room. 2. You must have someone with you to drive you home. You should not drive a car for 24 hours following surgery. Please make arrangements for a friend or family member to stay with you for the first 24 hours after your surgery. 3. No food after midnight. Medications morning of surgery should be taken with a sip of water. Please follow pre-surgery drink instructions that were given at your Pre Admission Testing appointment. 4. We recommend you do not drink any alcoholic beverages for 24 hours before and after your surgery. 5. Contact your surgeons office for instructions on the following medications: non-steroidal anti-inflammatory drugs (i.e. Advil, Aleve), vitamins, and supplements. (Some surgeons will want you to stop these medications prior to surgery and others may allow you to take them)  **If you are currently taking Plavix, Coumadin, Aspirin and/or other blood-thinning agents, contact your surgeon for instructions. ** Your surgeon will partner with the physician prescribing these medications to determine if it is safe to stop or if you need to continue taking. Please do not stop taking these medications without instructions from your surgeon    6. Wear comfortable clothes. Wear glasses instead of contacts. Do not bring any money or jewelry. Please bring picture ID, insurance card, and any prearranged co-payment or hospital payment. Do not wear make-up, particularly mascara the morning of your surgery. Do not wear nail polish, particularly if you are having foot /hand surgery.   Wear your hair loose or down, no ponytails, buns, cipriano pins or clips. All body piercings must be removed. Please shower with antibacterial soap for three consecutive days before and on the morning of surgery, but do not apply any lotions, powders or deodorants after the shower on the day of surgery. Please use a fresh towels after each shower. Please sleep in clean clothes and change bed linens the night before surgery. Please do not shave for 48 hours prior to surgery. Shaving of the face is acceptable. 7. You should understand that if you do not follow these instructions your surgery may be cancelled. If your physical condition changes (I.e. fever, cold or flu) please contact your surgeon as soon as possible. 8. It is important that you be on time. If a situation occurs where you may be late, please call [phone] (OR Holding Area). 9. If you have any questions and or problems, please call [phone] (Pre-admission Testing). 10. Your surgery time may be subject to change. You will receive a phone call the evening prior if your time changes. 11.  If having outpatient surgery, you must have someone to drive you here, stay with you during the duration of your stay, and to drive you home at time of discharge. 12. The following link is for the educational video for patients and/or families. http://[facility]org/. [email or web address]-zqtspvb-[email or web address]/[email or web address]/[email or web address]-materials    13  Due to current COVID restrictions, only two adults may accompany you the day of the procedure. We have limited seating available. If our waiting room is at capacity, your ride may be asked to remain in their vehicle. No children are allowed in the waiting room    Special Instructions: Stop taking celebrex and any vitamins or supplements 5 days prior to surgery.  Follow instructions for stopping Eliquis per cardiologist.       TAKE ALL MEDICATIONS THE DAY OF SURGERY EXCEPT: Eliquis, celebrex, metamucil, vitamins and supplements. May take other medications with a sip of water. I understand a pre-operative phone call will be made to verify my surgery time. In the event that I am not available, I give permission for a message to be left on my answering service and/or with another person?   yes         ___________________      __________   _________    (Signature of Patient)             (Witness)                (Date and Time)

## 2022-09-26 NOTE — PERIOP NOTES
Na+ 130. Faxed lab report to Dr. Radha Kc, pt's PCP, for review. Confirmation sheet received that fax went through. Called Dr. Cally Perry office to follow up on sodium treatment. Spoke to Red Sarver and was told pt was called and told to increase salt intake and drink gatorade as daily fluid intake. POC Chem will be done the day of surgery.

## 2022-09-26 NOTE — PERIOP NOTES

## 2022-09-26 NOTE — PERIOP NOTES
Hibiclens/Chlorhexidine    Preventing Infections Before and After - Your Surgery    IMPORTANT INSTRUCTIONS    Please read and follow these instructions carefully. If you are unable to comply with the below instructions your procedure will be cancelled. Every Night for Three (3) nights before your surgery:  Shower with an antibacterial soap, such as Dial, or the soap provided at your preassessment appointment. A shower is better than a bath for cleaning your skin. If needed, ask someone to help you reach all areas of your body. Dont forget to clean your belly button with every shower. The night before your surgery: If you lose your Hibiclens/chlorhexidine please contact surgery center or you can purchase it at a local pharmacy  On the night before your surgery, shower with an antibacterial soap, such as Dial, or the soap provided at your preassessment appointment. With one packet of Hibiclens/Chlorhexidine in hand, turn water off. Apply Hibiclens antiseptic skin cleanser with a clean, freshly washed washcloth. Gently apply to your body from chin to toes (except the genital area) and especially the area(s) where your incision(s) will be. Leave Hibiclens/Chlorhexidine on your skin for at least 20 seconds. CAUTION: If needed, Hibiclens/chlorhexidine may be used to clean the folds of skin of the legs (such as in the area of the groin) and on your buttocks and hips. However, do not use Hibiclens/Chlorhexidine above the neck or in the genital area (your bottom) or put inside any area of your body. Turn the water back on and rinse. Dry gently with a clean, freshly washed towel. After your shower, do not use any powder, deodorant, perfumes or lotion. Use clean, freshly washed towels and washcloths every time you shower. Wear clean, freshly washed pajamas to bed the night before surgery. Sleep on clean, freshly washed sheets. Do not allow pets to sleep in your bed with you.         The Morning of your surgery:  Shower again thoroughly with an antibacterial soap, such as Dial or the soap provided at your preassessment appointment. If needed, ask someone for help to reach all areas of your body. Dont forget to clean your belly button! Rinse. Dry gently with a clean, freshly washed towel. After your shower, do not use any powder, deodorant, perfumes or lotion prior to surgery. Put on clean, freshly washed clothing. Tips to help prevent infections after your surgery:  Protect your surgical wound from germs:  Hand washing is the most important thing you and your caregivers can do to prevent infections. Keep your bandage clean and dry! Do not touch your surgical wound. Use clean, freshly washed towels and washcloths every time you shower; do not share bath linens with others. Until your surgical wound is healed, wear clothing and sleep on bed linens each day that are clean and freshly washed. Do not allow pets to sleep in your bed with you or touch your surgical wound. Do not smoke - smoking delays wound healing. This may be a good time to stop smoking. If you have diabetes, it is important for you to manage your blood sugar levels properly before your surgery as well as after your surgery. Poorly managed blood sugar levels slow down wound healing and prevent you from healing completely. If you lose your Hibiclens/chlorhexidine, please call the [facility], or it is available for purchase at your pharmacy.                ___________________      ___________________      ________________  (Signature of Patient)          (Witness)                   (Date and Time)

## 2022-09-26 NOTE — PROGRESS NOTES
Patient attended the Joint Replacement Education Class at [facility]. The content of the class was presented using a power point presentation specific for patients undergoing hip and knee replacement surgery. The [facility] Joint Replacement Education Handbook was given to the patient. Preparing for surgery, day of surgery routine and expectations, discharge process and help at home expectations, nutrition,medications, infection control, pain management, DVT prevention, ice therapy and safety were reviewed in class. Opportunity for questions provided, patient verbalized understanding of instructions.

## 2022-09-27 LAB
BACTERIA SPEC CULT: NORMAL
BACTERIA SPEC CULT: NORMAL
SERVICE CMNT-IMP: NORMAL

## 2022-10-02 ENCOUNTER — ANESTHESIA EVENT (OUTPATIENT)
Dept: SURGERY | Age: 77
DRG: 470 | End: 2022-10-02
Payer: MEDICARE

## 2022-10-02 NOTE — ANESTHESIA PREPROCEDURE EVALUATION
Relevant Problems   CARDIOVASCULAR   (+) Atrial fibrillation (HCC)      ENDOCRINE   (+) Unspecified hypothyroidism       Anesthetic History   No history of anesthetic complications            Review of Systems / Medical History  Patient summary reviewed, nursing notes reviewed and pertinent labs reviewed    Pulmonary        Sleep apnea: CPAP           Neuro/Psych       CVA (s/p AVR, no recurrence)       Cardiovascular    Hypertension  Valvular problems/murmurs (s/p AVR): aortic stenosis      Dysrhythmias : atrial fibrillation      Exercise tolerance: >4 METS  Comments: Previously on Flecainide, now on Metoprolol and Diltiazem.  S/p afib ablation 2/8/22 (on Eliquis)    S/p bioprosthetic aortic valve 10/2009 with slowly progressive residual aortic stenosis (last echo 1/2022 showing normal LVEF with mean AV gradient 32 and mild to moderate tricuspid and mitral regurg)       GI/Hepatic/Renal     GERD    Renal disease: CRI      Comments: Hyponatremia  Worsening creatinine since 1/2021 Endo/Other      Hypothyroidism  Arthritis     Other Findings            Physical Exam    Airway  Mallampati: II  TM Distance: 4 - 6 cm  Neck ROM: normal range of motion   Mouth opening: Normal     Cardiovascular    Rhythm: regular  Rate: normal         Dental    Dentition: Upper partial plate     Pulmonary  Breath sounds clear to auscultation               Abdominal  GI exam deferred       Other Findings            Anesthetic Plan    ASA: 3  Anesthesia type: general and regional    Monitoring Plan: BIS  Post-op pain plan if not by surgeon: peripheral nerve block single    Induction: Intravenous  Anesthetic plan and risks discussed with: Patient

## 2022-10-03 ENCOUNTER — HOSPITAL ENCOUNTER (INPATIENT)
Age: 77
LOS: 1 days | Discharge: HOME HEALTH | DRG: 470 | End: 2022-10-03
Attending: ORTHOPAEDIC SURGERY | Admitting: ORTHOPAEDIC SURGERY
Payer: MEDICARE

## 2022-10-03 ENCOUNTER — ANESTHESIA (OUTPATIENT)
Dept: SURGERY | Age: 77
DRG: 470 | End: 2022-10-03
Payer: MEDICARE

## 2022-10-03 VITALS
DIASTOLIC BLOOD PRESSURE: 85 MMHG | HEIGHT: 62 IN | BODY MASS INDEX: 29.53 KG/M2 | TEMPERATURE: 97.8 F | HEART RATE: 84 BPM | RESPIRATION RATE: 16 BRPM | SYSTOLIC BLOOD PRESSURE: 192 MMHG | WEIGHT: 160.5 LBS | OXYGEN SATURATION: 100 %

## 2022-10-03 DIAGNOSIS — Z96.652 S/P TOTAL KNEE ARTHROPLASTY, LEFT: Primary | ICD-10-CM

## 2022-10-03 PROBLEM — Z96.659 TOTAL KNEE REPLACEMENT STATUS: Status: ACTIVE | Noted: 2022-10-03

## 2022-10-03 LAB
BASE EXCESS BLD CALC-SCNC: 1.3 MMOL/L
CA-I BLD-MCNC: 1.23 MMOL/L (ref 1.12–1.32)
CHLORIDE BLD-SCNC: 100 MMOL/L (ref 100–108)
CO2 BLD-SCNC: 28 MMOL/L (ref 19–24)
CREAT UR-MCNC: 0.9 MG/DL (ref 0.6–1.3)
GLUCOSE BLD STRIP.AUTO-MCNC: 71 MG/DL (ref 74–106)
HCO3 BLDA-SCNC: 28 MMOL/L
LACTATE BLD-SCNC: 2.13 MMOL/L (ref 0.4–2)
PCO2 BLDV: 50.3 MMHG (ref 41–51)
PH BLDV: 7.35 [PH] (ref 7.32–7.42)
PO2 BLDV: 35 MMHG (ref 25–40)
POTASSIUM BLD-SCNC: 3.9 MMOL/L (ref 3.5–5.5)
SODIUM BLD-SCNC: 140 MMOL/L (ref 136–145)
SPECIMEN SITE: ABNORMAL

## 2022-10-03 PROCEDURE — 77030035236 HC SUT PDS STRATFX BARB J&J -B: Performed by: ORTHOPAEDIC SURGERY

## 2022-10-03 PROCEDURE — 0SRD0JA REPLACEMENT OF LEFT KNEE JOINT WITH SYNTHETIC SUBSTITUTE, UNCEMENTED, OPEN APPROACH: ICD-10-PCS | Performed by: ORTHOPAEDIC SURGERY

## 2022-10-03 PROCEDURE — 77030033067 HC SUT PDO STRATFX SPIR J&J -B: Performed by: ORTHOPAEDIC SURGERY

## 2022-10-03 PROCEDURE — 76060000033 HC ANESTHESIA 1 TO 1.5 HR: Performed by: ORTHOPAEDIC SURGERY

## 2022-10-03 PROCEDURE — 74011250637 HC RX REV CODE- 250/637: Performed by: PHYSICIAN ASSISTANT

## 2022-10-03 PROCEDURE — 97530 THERAPEUTIC ACTIVITIES: CPT

## 2022-10-03 PROCEDURE — 77030031139 HC SUT VCRL2 J&J -A: Performed by: ORTHOPAEDIC SURGERY

## 2022-10-03 PROCEDURE — 76010000161 HC OR TIME 1 TO 1.5 HR INTENSV-TIER 1: Performed by: ORTHOPAEDIC SURGERY

## 2022-10-03 PROCEDURE — 74011250636 HC RX REV CODE- 250/636: Performed by: ANESTHESIOLOGY

## 2022-10-03 PROCEDURE — 77030019908 HC STETH ESOPH SIMS -A: Performed by: ANESTHESIOLOGY

## 2022-10-03 PROCEDURE — 82947 ASSAY GLUCOSE BLOOD QUANT: CPT

## 2022-10-03 PROCEDURE — 76210000006 HC OR PH I REC 0.5 TO 1 HR: Performed by: ORTHOPAEDIC SURGERY

## 2022-10-03 PROCEDURE — 77030026438 HC STYL ET INTUB CARD -A: Performed by: ANESTHESIOLOGY

## 2022-10-03 PROCEDURE — 65270000029 HC RM PRIVATE

## 2022-10-03 PROCEDURE — 74011250636 HC RX REV CODE- 250/636: Performed by: ORTHOPAEDIC SURGERY

## 2022-10-03 PROCEDURE — 77030013079 HC BLNKT BAIR HGGR 3M -A: Performed by: ANESTHESIOLOGY

## 2022-10-03 PROCEDURE — 74011250636 HC RX REV CODE- 250/636: Performed by: PHYSICIAN ASSISTANT

## 2022-10-03 PROCEDURE — 74011250637 HC RX REV CODE- 250/637: Performed by: ORTHOPAEDIC SURGERY

## 2022-10-03 PROCEDURE — 74011000250 HC RX REV CODE- 250: Performed by: ANESTHESIOLOGY

## 2022-10-03 PROCEDURE — 97116 GAIT TRAINING THERAPY: CPT

## 2022-10-03 PROCEDURE — 77030008684 HC TU ET CUF COVD -B: Performed by: ANESTHESIOLOGY

## 2022-10-03 PROCEDURE — 77030000032 HC CUF TRNQT ZIMM -B: Performed by: ORTHOPAEDIC SURGERY

## 2022-10-03 PROCEDURE — 2709999900 HC NON-CHARGEABLE SUPPLY: Performed by: ORTHOPAEDIC SURGERY

## 2022-10-03 PROCEDURE — 77030028907 HC WRP KNEE WO BGS SOLM -B

## 2022-10-03 PROCEDURE — 8E0YXBZ COMPUTER ASSISTED PROCEDURE OF LOWER EXTREMITY: ICD-10-PCS | Performed by: ORTHOPAEDIC SURGERY

## 2022-10-03 PROCEDURE — 77030006835 HC BLD SAW SAG STRY -B: Performed by: ORTHOPAEDIC SURGERY

## 2022-10-03 PROCEDURE — 77030018673: Performed by: ORTHOPAEDIC SURGERY

## 2022-10-03 PROCEDURE — 97161 PT EVAL LOW COMPLEX 20 MIN: CPT

## 2022-10-03 PROCEDURE — 74011000250 HC RX REV CODE- 250: Performed by: PHYSICIAN ASSISTANT

## 2022-10-03 PROCEDURE — 77030036722: Performed by: ORTHOPAEDIC SURGERY

## 2022-10-03 PROCEDURE — C1776 JOINT DEVICE (IMPLANTABLE): HCPCS | Performed by: ORTHOPAEDIC SURGERY

## 2022-10-03 PROCEDURE — 77030036638 HC ACC KT GPS KNE V2 EXAC -D: Performed by: ORTHOPAEDIC SURGERY

## 2022-10-03 PROCEDURE — 77030013837 HC NERV BLK KT BBMI -B: Performed by: ANESTHESIOLOGY

## 2022-10-03 PROCEDURE — 77030034479 HC ADH SKN CLSR PRINEO J&J -B: Performed by: ORTHOPAEDIC SURGERY

## 2022-10-03 DEVICE — COMPONENT TOT KNEE CAPPED K2 HEMI ADV CMNTLS K2EXACTECH: Type: IMPLANTABLE DEVICE | Status: FUNCTIONAL

## 2022-10-03 DEVICE — IMPLANTABLE DEVICE: Type: IMPLANTABLE DEVICE | Site: KNEE | Status: FUNCTIONAL

## 2022-10-03 DEVICE — SCREW BONE L40MM DIA6.5MM FOR NOVATION CRWN CUP ACET SHELL: Type: IMPLANTABLE DEVICE | Site: KNEE | Status: FUNCTIONAL

## 2022-10-03 RX ORDER — AMOXICILLIN 250 MG
1 CAPSULE ORAL 2 TIMES DAILY
Qty: 14 TABLET | Refills: 0 | Status: SHIPPED | OUTPATIENT
Start: 2022-10-03 | End: 2022-10-10

## 2022-10-03 RX ORDER — ONDANSETRON 4 MG/1
4 TABLET, ORALLY DISINTEGRATING ORAL
Status: DISCONTINUED | OUTPATIENT
Start: 2022-10-05 | End: 2022-10-03 | Stop reason: HOSPADM

## 2022-10-03 RX ORDER — FACIAL-BODY WIPES
10 EACH TOPICAL DAILY PRN
Status: DISCONTINUED | OUTPATIENT
Start: 2022-10-05 | End: 2022-10-03 | Stop reason: HOSPADM

## 2022-10-03 RX ORDER — HYDROMORPHONE HYDROCHLORIDE 2 MG/ML
INJECTION, SOLUTION INTRAMUSCULAR; INTRAVENOUS; SUBCUTANEOUS AS NEEDED
Status: DISCONTINUED | OUTPATIENT
Start: 2022-10-03 | End: 2022-10-03 | Stop reason: HOSPADM

## 2022-10-03 RX ORDER — FAMOTIDINE 20 MG/1
20 TABLET, FILM COATED ORAL DAILY
Status: DISCONTINUED | OUTPATIENT
Start: 2022-10-03 | End: 2022-10-03 | Stop reason: HOSPADM

## 2022-10-03 RX ORDER — SODIUM CHLORIDE, SODIUM LACTATE, POTASSIUM CHLORIDE, CALCIUM CHLORIDE 600; 310; 30; 20 MG/100ML; MG/100ML; MG/100ML; MG/100ML
25 INJECTION, SOLUTION INTRAVENOUS CONTINUOUS
Status: DISCONTINUED | OUTPATIENT
Start: 2022-10-03 | End: 2022-10-03 | Stop reason: HOSPADM

## 2022-10-03 RX ORDER — ONDANSETRON 2 MG/ML
INJECTION INTRAMUSCULAR; INTRAVENOUS AS NEEDED
Status: DISCONTINUED | OUTPATIENT
Start: 2022-10-03 | End: 2022-10-03 | Stop reason: HOSPADM

## 2022-10-03 RX ORDER — DIPHENHYDRAMINE HCL 12.5MG/5ML
12.5 ELIXIR ORAL
Status: DISCONTINUED | OUTPATIENT
Start: 2022-10-03 | End: 2022-10-03 | Stop reason: SDUPTHER

## 2022-10-03 RX ORDER — POLYETHYLENE GLYCOL 3350 17 G/17G
17 POWDER, FOR SOLUTION ORAL DAILY
Status: DISCONTINUED | OUTPATIENT
Start: 2022-10-03 | End: 2022-10-03 | Stop reason: HOSPADM

## 2022-10-03 RX ORDER — TRAMADOL HYDROCHLORIDE 50 MG/1
50-100 TABLET ORAL
Qty: 30 TABLET | Refills: 0 | Status: SHIPPED | OUTPATIENT
Start: 2022-10-03 | End: 2022-10-10

## 2022-10-03 RX ORDER — ACETAMINOPHEN 500 MG
1000 TABLET ORAL ONCE
Status: COMPLETED | OUTPATIENT
Start: 2022-10-03 | End: 2022-10-03

## 2022-10-03 RX ORDER — POLYETHYLENE GLYCOL 3350 17 G/17G
17 POWDER, FOR SOLUTION ORAL DAILY
Qty: 7 PACKET | Refills: 0 | Status: SHIPPED | OUTPATIENT
Start: 2022-10-03 | End: 2022-10-10

## 2022-10-03 RX ORDER — ACETAMINOPHEN 325 MG/1
650 TABLET ORAL EVERY 6 HOURS
Qty: 56 TABLET | Refills: 0 | Status: SHIPPED | OUTPATIENT
Start: 2022-10-03 | End: 2022-10-10

## 2022-10-03 RX ORDER — LIDOCAINE HYDROCHLORIDE 10 MG/ML
0.1 INJECTION, SOLUTION EPIDURAL; INFILTRATION; INTRACAUDAL; PERINEURAL AS NEEDED
Status: DISCONTINUED | OUTPATIENT
Start: 2022-10-03 | End: 2022-10-03 | Stop reason: HOSPADM

## 2022-10-03 RX ORDER — CEFAZOLIN SODIUM 1 G/3ML
INJECTION, POWDER, FOR SOLUTION INTRAMUSCULAR; INTRAVENOUS AS NEEDED
Status: DISCONTINUED | OUTPATIENT
Start: 2022-10-03 | End: 2022-10-03 | Stop reason: HOSPADM

## 2022-10-03 RX ORDER — ACETAMINOPHEN 325 MG/1
650 TABLET ORAL EVERY 6 HOURS
Status: DISCONTINUED | OUTPATIENT
Start: 2022-10-03 | End: 2022-10-03 | Stop reason: HOSPADM

## 2022-10-03 RX ORDER — NALOXONE HYDROCHLORIDE 0.4 MG/ML
0.4 INJECTION, SOLUTION INTRAMUSCULAR; INTRAVENOUS; SUBCUTANEOUS AS NEEDED
Status: DISCONTINUED | OUTPATIENT
Start: 2022-10-03 | End: 2022-10-03 | Stop reason: HOSPADM

## 2022-10-03 RX ORDER — ROPIVACAINE HYDROCHLORIDE 5 MG/ML
INJECTION, SOLUTION EPIDURAL; INFILTRATION; PERINEURAL AS NEEDED
Status: DISCONTINUED | OUTPATIENT
Start: 2022-10-03 | End: 2022-10-03 | Stop reason: HOSPADM

## 2022-10-03 RX ORDER — AMOXICILLIN 250 MG
1 CAPSULE ORAL 2 TIMES DAILY
Status: DISCONTINUED | OUTPATIENT
Start: 2022-10-03 | End: 2022-10-03 | Stop reason: HOSPADM

## 2022-10-03 RX ORDER — DIPHENHYDRAMINE HCL 12.5MG/5ML
12.5 LIQUID (ML) ORAL
Status: DISCONTINUED | OUTPATIENT
Start: 2022-10-03 | End: 2022-10-03 | Stop reason: HOSPADM

## 2022-10-03 RX ORDER — ONDANSETRON 2 MG/ML
4 INJECTION INTRAMUSCULAR; INTRAVENOUS
Status: DISCONTINUED | OUTPATIENT
Start: 2022-10-03 | End: 2022-10-03 | Stop reason: HOSPADM

## 2022-10-03 RX ORDER — FENTANYL CITRATE 50 UG/ML
50 INJECTION, SOLUTION INTRAMUSCULAR; INTRAVENOUS AS NEEDED
Status: DISCONTINUED | OUTPATIENT
Start: 2022-10-03 | End: 2022-10-03 | Stop reason: HOSPADM

## 2022-10-03 RX ORDER — PREGABALIN 75 MG/1
75 CAPSULE ORAL ONCE
Status: COMPLETED | OUTPATIENT
Start: 2022-10-03 | End: 2022-10-03

## 2022-10-03 RX ORDER — CELECOXIB 200 MG/1
400 CAPSULE ORAL ONCE
Status: COMPLETED | OUTPATIENT
Start: 2022-10-03 | End: 2022-10-03

## 2022-10-03 RX ORDER — HYDRALAZINE HYDROCHLORIDE 20 MG/ML
INJECTION INTRAMUSCULAR; INTRAVENOUS AS NEEDED
Status: DISCONTINUED | OUTPATIENT
Start: 2022-10-03 | End: 2022-10-03 | Stop reason: HOSPADM

## 2022-10-03 RX ORDER — FENTANYL CITRATE 50 UG/ML
25 INJECTION, SOLUTION INTRAMUSCULAR; INTRAVENOUS
Status: DISCONTINUED | OUTPATIENT
Start: 2022-10-03 | End: 2022-10-03 | Stop reason: HOSPADM

## 2022-10-03 RX ORDER — METOPROLOL SUCCINATE 25 MG/1
12.5 TABLET, EXTENDED RELEASE ORAL DAILY
Status: DISCONTINUED | OUTPATIENT
Start: 2022-10-04 | End: 2022-10-03 | Stop reason: HOSPADM

## 2022-10-03 RX ORDER — LIDOCAINE HYDROCHLORIDE 20 MG/ML
INJECTION, SOLUTION EPIDURAL; INFILTRATION; INTRACAUDAL; PERINEURAL AS NEEDED
Status: DISCONTINUED | OUTPATIENT
Start: 2022-10-03 | End: 2022-10-03 | Stop reason: HOSPADM

## 2022-10-03 RX ORDER — PROPOFOL 10 MG/ML
INJECTION, EMULSION INTRAVENOUS AS NEEDED
Status: DISCONTINUED | OUTPATIENT
Start: 2022-10-03 | End: 2022-10-03 | Stop reason: HOSPADM

## 2022-10-03 RX ORDER — TRAMADOL HYDROCHLORIDE 50 MG/1
50-100 TABLET ORAL
Status: DISCONTINUED | OUTPATIENT
Start: 2022-10-03 | End: 2022-10-03 | Stop reason: HOSPADM

## 2022-10-03 RX ORDER — MIDAZOLAM HYDROCHLORIDE 1 MG/ML
1 INJECTION, SOLUTION INTRAMUSCULAR; INTRAVENOUS AS NEEDED
Status: DISCONTINUED | OUTPATIENT
Start: 2022-10-03 | End: 2022-10-03 | Stop reason: HOSPADM

## 2022-10-03 RX ORDER — ONDANSETRON 2 MG/ML
4 INJECTION INTRAMUSCULAR; INTRAVENOUS AS NEEDED
Status: DISCONTINUED | OUTPATIENT
Start: 2022-10-03 | End: 2022-10-03 | Stop reason: HOSPADM

## 2022-10-03 RX ORDER — SODIUM CHLORIDE 0.9 % (FLUSH) 0.9 %
5-40 SYRINGE (ML) INJECTION AS NEEDED
Status: DISCONTINUED | OUTPATIENT
Start: 2022-10-03 | End: 2022-10-03 | Stop reason: HOSPADM

## 2022-10-03 RX ORDER — ASPIRIN 81 MG/1
81 TABLET ORAL 2 TIMES DAILY
Status: DISCONTINUED | OUTPATIENT
Start: 2022-10-03 | End: 2022-10-03

## 2022-10-03 RX ORDER — DEXAMETHASONE SODIUM PHOSPHATE 4 MG/ML
10 INJECTION, SOLUTION INTRA-ARTICULAR; INTRALESIONAL; INTRAMUSCULAR; INTRAVENOUS; SOFT TISSUE ONCE
Status: DISCONTINUED | OUTPATIENT
Start: 2022-10-04 | End: 2022-10-03 | Stop reason: HOSPADM

## 2022-10-03 RX ORDER — DEXAMETHASONE SODIUM PHOSPHATE 4 MG/ML
INJECTION, SOLUTION INTRA-ARTICULAR; INTRALESIONAL; INTRAMUSCULAR; INTRAVENOUS; SOFT TISSUE AS NEEDED
Status: DISCONTINUED | OUTPATIENT
Start: 2022-10-03 | End: 2022-10-03 | Stop reason: HOSPADM

## 2022-10-03 RX ORDER — DILTIAZEM HYDROCHLORIDE 120 MG/1
120 CAPSULE, COATED, EXTENDED RELEASE ORAL
Status: DISCONTINUED | OUTPATIENT
Start: 2022-10-03 | End: 2022-10-03 | Stop reason: HOSPADM

## 2022-10-03 RX ORDER — SODIUM CHLORIDE 9 MG/ML
75 INJECTION, SOLUTION INTRAVENOUS CONTINUOUS
Status: DISCONTINUED | OUTPATIENT
Start: 2022-10-03 | End: 2022-10-03 | Stop reason: HOSPADM

## 2022-10-03 RX ORDER — HYDROMORPHONE HYDROCHLORIDE 1 MG/ML
.2-.5 INJECTION, SOLUTION INTRAMUSCULAR; INTRAVENOUS; SUBCUTANEOUS
Status: DISCONTINUED | OUTPATIENT
Start: 2022-10-03 | End: 2022-10-03 | Stop reason: HOSPADM

## 2022-10-03 RX ORDER — ROCURONIUM BROMIDE 10 MG/ML
INJECTION, SOLUTION INTRAVENOUS AS NEEDED
Status: DISCONTINUED | OUTPATIENT
Start: 2022-10-03 | End: 2022-10-03 | Stop reason: HOSPADM

## 2022-10-03 RX ORDER — MORPHINE SULFATE 2 MG/ML
2 INJECTION, SOLUTION INTRAMUSCULAR; INTRAVENOUS
Status: DISCONTINUED | OUTPATIENT
Start: 2022-10-03 | End: 2022-10-03 | Stop reason: HOSPADM

## 2022-10-03 RX ORDER — SODIUM CHLORIDE 0.9 % (FLUSH) 0.9 %
5-40 SYRINGE (ML) INJECTION EVERY 8 HOURS
Status: DISCONTINUED | OUTPATIENT
Start: 2022-10-03 | End: 2022-10-03 | Stop reason: HOSPADM

## 2022-10-03 RX ADMIN — CELECOXIB 400 MG: 200 CAPSULE ORAL at 07:01

## 2022-10-03 RX ADMIN — SODIUM CHLORIDE 75 ML/HR: 9 INJECTION, SOLUTION INTRAVENOUS at 09:00

## 2022-10-03 RX ADMIN — SUGAMMADEX 200 MG: 100 INJECTION, SOLUTION INTRAVENOUS at 08:31

## 2022-10-03 RX ADMIN — SENNOSIDES AND DOCUSATE SODIUM 1 TABLET: 50; 8.6 TABLET ORAL at 13:30

## 2022-10-03 RX ADMIN — CEFAZOLIN 1 G: 1 INJECTION, POWDER, FOR SOLUTION INTRAMUSCULAR; INTRAVENOUS; PARENTERAL at 07:51

## 2022-10-03 RX ADMIN — POLYETHYLENE GLYCOL 3350 17 G: 17 POWDER, FOR SOLUTION ORAL at 13:30

## 2022-10-03 RX ADMIN — ACETAMINOPHEN 650 MG: 325 TABLET ORAL at 13:30

## 2022-10-03 RX ADMIN — HYDROMORPHONE HYDROCHLORIDE 0.2 MG: 2 INJECTION, SOLUTION INTRAMUSCULAR; INTRAVENOUS; SUBCUTANEOUS at 08:01

## 2022-10-03 RX ADMIN — VANCOMYCIN HYDROCHLORIDE 1000 MG: 1 INJECTION, POWDER, LYOPHILIZED, FOR SOLUTION INTRAVENOUS at 07:06

## 2022-10-03 RX ADMIN — FENTANYL CITRATE 25 MCG: 50 INJECTION, SOLUTION INTRAMUSCULAR; INTRAVENOUS at 09:34

## 2022-10-03 RX ADMIN — SODIUM CHLORIDE, POTASSIUM CHLORIDE, SODIUM LACTATE AND CALCIUM CHLORIDE 25 ML/HR: 600; 310; 30; 20 INJECTION, SOLUTION INTRAVENOUS at 07:06

## 2022-10-03 RX ADMIN — ONDANSETRON HYDROCHLORIDE 4 MG: 2 INJECTION, SOLUTION INTRAMUSCULAR; INTRAVENOUS at 08:23

## 2022-10-03 RX ADMIN — PREGABALIN 75 MG: 75 CAPSULE ORAL at 07:01

## 2022-10-03 RX ADMIN — TRAMADOL HYDROCHLORIDE 50 MG: 50 TABLET ORAL at 16:31

## 2022-10-03 RX ADMIN — DEXAMETHASONE SODIUM PHOSPHATE 4 MG: 4 INJECTION, SOLUTION INTRAMUSCULAR; INTRAVENOUS at 08:10

## 2022-10-03 RX ADMIN — SODIUM CHLORIDE, POTASSIUM CHLORIDE, SODIUM LACTATE AND CALCIUM CHLORIDE: 600; 310; 30; 20 INJECTION, SOLUTION INTRAVENOUS at 07:37

## 2022-10-03 RX ADMIN — Medication 1 AMPULE: at 13:31

## 2022-10-03 RX ADMIN — ROPIVACAINE HYDROCHLORIDE 20 ML: 5 INJECTION, SOLUTION EPIDURAL; INFILTRATION; PERINEURAL at 07:19

## 2022-10-03 RX ADMIN — LIDOCAINE HYDROCHLORIDE 100 MG: 20 INJECTION, SOLUTION EPIDURAL; INFILTRATION; INTRACAUDAL; PERINEURAL at 07:45

## 2022-10-03 RX ADMIN — HYDROMORPHONE HYDROCHLORIDE 1 MG: 2 INJECTION, SOLUTION INTRAMUSCULAR; INTRAVENOUS; SUBCUTANEOUS at 07:45

## 2022-10-03 RX ADMIN — CEFAZOLIN SODIUM 2 G: 1 INJECTION, POWDER, FOR SOLUTION INTRAMUSCULAR; INTRAVENOUS at 15:56

## 2022-10-03 RX ADMIN — HYDRALAZINE HYDROCHLORIDE 10 MG: 20 INJECTION INTRAMUSCULAR; INTRAVENOUS at 08:10

## 2022-10-03 RX ADMIN — ROCURONIUM BROMIDE 50 MG: 10 INJECTION INTRAVENOUS at 07:45

## 2022-10-03 RX ADMIN — HYDROMORPHONE HYDROCHLORIDE 0.5 MG: 2 INJECTION, SOLUTION INTRAMUSCULAR; INTRAVENOUS; SUBCUTANEOUS at 08:20

## 2022-10-03 RX ADMIN — PROPOFOL 150 MG: 10 INJECTION, EMULSION INTRAVENOUS at 07:45

## 2022-10-03 RX ADMIN — PROPOFOL 50 MG: 10 INJECTION, EMULSION INTRAVENOUS at 07:15

## 2022-10-03 RX ADMIN — HYDROMORPHONE HYDROCHLORIDE 0.3 MG: 2 INJECTION, SOLUTION INTRAMUSCULAR; INTRAVENOUS; SUBCUTANEOUS at 08:37

## 2022-10-03 RX ADMIN — SODIUM CHLORIDE, PRESERVATIVE FREE 5 ML: 5 INJECTION INTRAVENOUS at 14:00

## 2022-10-03 RX ADMIN — Medication 1000 MG: at 07:01

## 2022-10-03 RX ADMIN — Medication 3 AMPULE: at 07:28

## 2022-10-03 RX ADMIN — FAMOTIDINE 20 MG: 20 TABLET, FILM COATED ORAL at 13:30

## 2022-10-03 NOTE — OP NOTES
PREOPERATIVE DIAGNOSIS:  Left knee degenerative joint disease    POSTOPERATIVE DIAGNOSIS:  Same    PROCEDURE: Total knee replacement with imageless computer navigation    Implants Used: [employer] Truliant Pressfit Femur size 2CR, Truliant Pressfit Tibia size 2, 10mm CRC poly, two 6.5mm screws    Implant Name Type Inv. Item Serial No.  Lot No. LRB No. Used Action   COMPONENT TIB TY 2F/2T KNEE POROUS TRULIANT - [id number]  COMPONENT TIB TY 2F/2T KNEE POROUS TRULIANT [id number] [employer] NA Left 1 Implanted   SCREW BONE L40MM DIA6.5MM FOR NOVATION CRWN CUP ACET SHELL - [id number]  SCREW BONE L40MM DIA6.5MM FOR NOVATION CRWN CUP ACET SHELL [id number] [employer]WD NA Left 1 Implanted   SCREW BONE L40MM DIA6.5MM FOR NOVATION CRWN CUP ACET SHELL - [id number]  SCREW BONE L40MM DIA6.5MM FOR NOVATION CRWN CUP ACET SHELL [id number] [employer]WD NA Left 1 Implanted   INSERT TIB CR 2 10 MM TRULIANT - [id number]  INSERT TIB CR 2 10 MM TRULIANT [id number] [employer] NA Left 1 Implanted   COMPONENT FEM CR 2 LT KNEE POROUS TRULIANT - [id number]  COMPONENT FEM CR 2 LT KNEE POROUS TRULIANT [id number] [employer] NA Left 1 Implanted       Anesthesia: General + block / local    Surgeon: Troy Cortez     Assistant: JIM Arcos (Performing all or most of the following assistant-at-surgery services including but not limited to: proper patient positioning, sterile/prep and draping, placement of instruments/trackers, operative exposure, minor portions of bone / soft tissue excision, final irrigation and debridement, deep and superficial closure, application of final dressings)    EBL: minimal    Drains: none     Specimens: none     Complications: none     Condition: stable to PACU     INDICATIONS: Longstanding knee pain unresponsive to conservative measures. The risks, benefits, and alternatives to the procedure were explained to the patient and they wished to proceed.  They understood no guarantees could be given about the outcome of the procedure. DESCRIPTION OF PROCEDURE:  The patient was brought in to the operating room and placed supine on a standard OR table. Anesthesia was provided by the anesthesia team without difficulty. A thigh tourniquet was applied to the operative limb which was then prepped and draped in the usual fashion. Pre-operative antibiotics were administered. An appropriate time-out was performed. The limb was exsanguinated with an Esmarch and tourniquet inflated. A standard medial parapatellar arthrotomy was used. The fat pad was excised. The patella was then subluxed laterally and attention turned to the femur. Navigation was used after the registration sequence to make the appropriate distal femoral cut, and drill for the lugs of the 4-in-1 guide. The femoral cut was confirmed with navigation. The ACL was excised along with the anterior portions of the menisci. The  4-in-1 guide position was confirmed with navigation and pinned in parallel to the epicondylar axis and perpendicular to Edwards's line. The anterior, posterior and chamfer cuts were performed, protecting the PCL and collateral ligaments at all times. The posterior capsule was cleared and any osteophytes were removed. Attention was then turned to the tibia. The navigation system was used to perform the tibial cut perpendicular to the mechanical axis. The remainder of the menisci were excised and the tibia sized. The patella was noted to be in acceptable condition and was not resurfaced. Selective denervation was performed. Trial components were then placed and the knee taken through a range of motion and found to have excellent range of motion, stability, and ligamentous balance. The rotation of the tibial tray was marked and the trials removed. The trial tibial tray was reinserted and the tibial prepared for the keel of the tray. The final implants were then impacted into place and two 6.5mm tibial screws placed.  The tibial tray liner was inserted into the locking mechanism and the knee reduced. It was again taken through a range of motion and found to be stable in all planes with excellent tracking of the patella. The wound was thoroughly lavaged. The extensor mechanism was repaired with heavy interrupted suture and a running stitch. Periarticular injection was performed. Skin closure was then performed in layers. Sterile compressive dressings were applied. The patient was awakened, moved to the stretcher and taken to the recovery room in stable condition. At the conclusion of the procedure, all counts were correct. There no immediate complications.

## 2022-10-03 NOTE — PROGRESS NOTES
Ortho / Neurosurgery NP Note    POD# 0  s/p LEFT TOTAL KNEE ARTHROPLASTY WITH NAVIGATION   Pt seen with Dr. Cesar Klein. Family at bedside. Pt resting in bed. Groggy but awake and appropriate. Reports minimal post-op pain, aware of prn Tramadol. No other complaints. Tolerating clear liquids. No nausae. VSS Afebrile. Room air. Visit Vitals  BP (!) 158/69   Pulse 70   Temp 97.7 °F (36.5 °C)   Resp 16   Ht 5' 2\" (1.575 m)   Wt 72.8 kg (160 lb 7.9 oz)   SpO2 100%   BMI 29.35 kg/m²       Voiding status: due to void   Output (mL)  Urine Voided: 0 ml (10/03/22 1000)  Emesis: 0 mL (10/03/22 1000)  Stool: 0 ml (10/03/22 1000)  Last Bowel Movement Date: 10/02/22 (10/03/22 1016)  Blood: 0 ml (10/03/22 1000)  Unmeasurable Output  Urine Occurrence(s): 0 (10/03/22 1000)  Stool Occurrence(s): 0 (10/03/22 1000)  Emesis Occurrence(s): 0 (10/03/22 1000)      Labs    Lab Results   Component Value Date/Time    HGB 12.0 09/26/2022 08:08 AM      Lab Results   Component Value Date/Time    INR 1.0 09/26/2022 08:08 AM      Lab Results   Component Value Date/Time    Sodium 130 (L) 09/26/2022 08:08 AM    Potassium 4.7 09/26/2022 08:08 AM    Chloride 98 09/26/2022 08:08 AM    CO2 26 09/26/2022 08:08 AM    Glucose 108 (H) 09/26/2022 08:08 AM    BUN 19 09/26/2022 08:08 AM    Creatinine 1.27 (H) 09/26/2022 08:08 AM    Calcium 9.1 09/26/2022 08:08 AM     Recent Glucose Results:   Lab Results   Component Value Date/Time    GLUCPOC 71 (L) 10/03/2022 06:56 AM           Body mass index is 29.35 kg/m². : A BMI > 30 is classified as obesity and > 40 is classified as morbid obesity. Dressing c.d. I  Cryotherapy in place over incision  Calves soft and supple;  No pain with passive stretch  Sensation and motor intact   SCDs for mechanical DVT proph while in bed     PLAN:  1) PT BID - WBAT  2) Resume Eliquis for DVT Prophylaxis - (Hx Afib/flutter, SVT, AVR)  3) GI Prophylaxis - Pepcid  4) Pain control - scheduled tylenol, prn Tramadol 5) Readniess for discharge:     [x] Vital Signs stable    [] Hgb stable    [] + Voiding    [x] Wound intact, drainage minimal    [x] Tolerating PO intake     [] Cleared by PT (OT if applicable)     [] Stair training completed (if applicable)    [] Independent / Contact Guard Assist (household distance)     [] Bed mobility     [] Car transfers     [] ADLs    [x] Adequate pain control on oral medication alone     Possibly home later today if able to void and clear PT.      Jayden Cornejo, NP

## 2022-10-03 NOTE — DISCHARGE SUMMARY
Ortho Discharge Summary    Patient ID:  Amina Hernandez  [id number]  female  76 y.o.  1945    Admit date: 10/3/2022    Discharge date: 10/3/2022    Admitting Physician: Paxton Aviles MD     Consulting Physician(s):   Treatment Team: Attending Provider: Harmony Jeffrey MD; Utilization Review: Bethany Lama RN    Date of Surgery:   10/3/2022     Preoperative Diagnosis:  LEFT KNEE OSTEOARTHRITIS    Postoperative Diagnosis:   LEFT KNEE OSTEOARTHRITIS    Procedure(s):   LEFT TOTAL KNEE ARTHROPLASTY WITH NAVIGATION     Anesthesia Type:   General     Surgeon: Harmony Jeffrey MD                            HPI:  Pt is a 76 y.o. female who has a history of LEFT KNEE OSTEOARTHRITIS  with pain and limitations of activities of daily living who presents at this time for a LEFT TOTAL [address] following the failure of conservative management. PMH:   Past Medical History:   Diagnosis Date    Aortic stenosis     secondary to rheumatic fever as a child, s/p AVR with porcine valve 10/09    Arrhythmia 04/2021    afib/flutter    Arrhythmia 05/2020    SVT    Arthritis     generalized    Chronic pain     d/t arthritis    Degenerative disc disease, cervical     Diverticulosis     GERD (gastroesophageal reflux disease)     Heart failure ([facility] [address]     12/27/21 CHF    Ill-defined condition     diverticulosis    Other and unspecified hyperlipidemia     Rheumatic fever     Sleep apnea     BiPAP    Stroke ([facility] [address]     5 days post post AOVR; no recurrence    Unspecified essential hypertension     Unspecified hypothyroidism        Body mass index is 29.35 kg/m². : A BMI > 30 is classified as obesity and > 40 is classified as morbid obesity. Medications upon admission :   Prior to Admission Medications   Prescriptions Last Dose Informant Patient Reported? Taking? Lactobacillus acidophilus (PROBIOTIC PO) 9/26/2022  Yes Yes   Sig: Take 1 Tablet by mouth daily.    OTHER 9/26/2022  Yes Yes   Sig: Take 500 mg by mouth two (2) times a day. Tumeric   amLODIPine (NORVASC) 5 mg tablet 10/3/2022 at 0415  Yes Yes   Sig: Take 5 mg by mouth daily. apixaban (ELIQUIS) 5 mg tablet 2022  Yes No   Sig: Take 5 mg by mouth two (2) times a day. celecoxib (CELEBREX) 200 mg capsule 2022  Yes Yes   Sig: Take 200 mg by mouth daily. cetirizine (ZYRTEC) 10 mg tablet 2022  Yes No   Sig: Take 10 mg by mouth daily as needed for Allergies. cholecalciferol, vitamin D3, 50 mcg (2,000 unit) tab 2022  Yes Yes   Sig: Take 2,000 Units by mouth daily. dicyclomine (BENTYL) 10 mg capsule 10/3/2022 at 0415  Yes Yes   Sig: Take 10 mg by mouth four (4) times daily as needed. dilTIAZem ER (CARDIZEM LA) 120 mg tablet 10/2/2022 at 0600  Yes Yes   Sig: Take 120 mg by mouth nightly. fluticasone propionate (FLONASE) 50 mcg/actuation nasal spray 10/2/2022 at 2145  Yes Yes   Si Sprays by Both Nostrils route daily as needed for Rhinitis. furosemide (LASIX) 20 mg tablet 10/1/2022  Yes No   Sig: Take 40 mg by mouth daily. Takes 2 tablets   levothyroxine (SYNTHROID) 88 mcg tablet 10/3/2022 at 0415  Yes Yes   Sig: Take 88 mcg by mouth Daily (before breakfast). Takes 6 days out of the week. Takes a 1/2 pill (44 mcg) one day out of the week. levothyroxine (SYNTHROID) 88 mcg tablet 2022 at 041  Yes No   Sig: Take 44 mcg by mouth Daily (before breakfast). Takes 1/2 a tablet one day out of the week. methocarbamoL (ROBAXIN) 500 mg tablet 10/2/2022 at 2145  Yes Yes   Sig: Take 500 mg by mouth three (3) times daily as needed for Muscle Spasm(s). metoprolol succinate (TOPROL-XL) 25 mg XL tablet 10/2/2022 at 2145  Yes Yes   Sig: Take 12.5 mg by mouth daily. Takes 1/2 a tablet   omega-3 fatty acids-vitamin e 1,000 mg cap 2022  Yes Yes   Sig: Take 1 Cap by mouth daily. omeprazole (PRILOSEC) 40 mg capsule 10/3/2022 at -415  Yes Yes   Sig: Take 40 mg by mouth daily.    psyllium (METAMUCIL) powd 10/2/2022  Yes Yes   Sig: Take 1 Scoop by mouth two (2) times a day. Takes 1 tbsp   simvastatin (ZOCOR) 20 mg tablet 10/2/2022 at 2145  Yes Yes   Sig: Take 20 mg by mouth nightly. Facility-Administered Medications: None        Allergies: Allergies   Allergen Reactions    Penicillins Swelling     Difficulty breatheing    Erythromycin Nausea and Vomiting        Hospital Course: The patient underwent surgery. Complications:  None; patient tolerated the procedure well. Was taken to the PACU in stable condition and then transferred to the ortho floor. Perioperative Antibiotics:  Ancef     Postoperative Pain Management:  Tramadol & Tylenol     DVT Prophylaxis: Eliquis (PTA medication)    Postoperative transfusions:    Number of units banked PRBCs =   none     Post Op complications: none    Hemoglobin at discharge:    Lab Results   Component Value Date/Time    HGB 12.0 09/26/2022 08:08 AM    INR 1.0 09/26/2022 08:08 AM       Dressing remained clean, dry and intact. No significant erythema or swelling. Wound appears to be healing without any evidence of infection. Neurovascular exam found to be within normal limits. Physical Therapy started following surgery and participated in bed mobility, transfers and ambulation. Gait:  Gait  Base of Support: Narrowed  Speed/Sally: Pace decreased (<100 feet/min)  Step Length: Left shortened, Right shortened  Gait Abnormalities: Decreased step clearance, Step to gait, Antalgic  Ambulation - Level of Assistance: Stand-by assistance  Distance (ft): 180 Feet (ft)  Assistive Device: Walker, rolling, Gait belt                   Discharged to: Home with . Condition on Discharge:   stable    Discharge instructions:  - Anticoagulate with Eliquis   - Take pain medications as prescribed  - Resume pre hospital diet      - Discharge activity: activity as tolerated  - Ambulate with assistive device as needed. - Weight bearing status - WBAT  - Wound Care Keep wound clean and dry.   See discharge instruction sheet.            -DISCHARGE MEDICATION LIST     Current Discharge Medication List        START taking these medications    Details   acetaminophen (TYLENOL) 325 mg tablet Take 2 Tablets by mouth every six (6) hours for 7 days. Qty: 56 Tablet, Refills: 0  Start date: 10/3/2022, End date: 10/10/2022      traMADoL (ULTRAM) 50 mg tablet Take 1-2 Tablets by mouth every six (6) hours as needed for Pain for up to 7 days. Max Daily Amount: 400 mg. One tab for mild to moderate pain level 1-6, or 2 tabs for severe pain level 7-10  Qty: 30 Tablet, Refills: 0  Start date: 10/3/2022, End date: 10/10/2022    Associated Diagnoses: S/P total knee arthroplasty, left      polyethylene glycol (MIRALAX) 17 gram packet Take 1 Packet by mouth daily for 7 days. Qty: 7 Packet, Refills: 0  Start date: 10/3/2022, End date: 10/10/2022      senna-docusate (PERICOLACE) 8.6-50 mg per tablet Take 1 Tablet by mouth two (2) times a day for 7 days. Qty: 14 Tablet, Refills: 0  Start date: 10/3/2022, End date: 10/10/2022           CONTINUE these medications which have NOT CHANGED    Details   !! levothyroxine (SYNTHROID) 88 mcg tablet Take 88 mcg by mouth Daily (before breakfast). Takes 6 days out of the week. Takes a 1/2 pill (44 mcg) one day out of the week. metoprolol succinate (TOPROL-XL) 25 mg XL tablet Take 12.5 mg by mouth daily. Takes 1/2 a tablet      dilTIAZem ER (CARDIZEM LA) 120 mg tablet Take 120 mg by mouth nightly. omeprazole (PRILOSEC) 40 mg capsule Take 40 mg by mouth daily. celecoxib (CELEBREX) 200 mg capsule Take 200 mg by mouth daily. cholecalciferol, vitamin D3, 50 mcg (2,000 unit) tab Take 2,000 Units by mouth daily. Lactobacillus acidophilus (PROBIOTIC PO) Take 1 Tablet by mouth daily. psyllium (METAMUCIL) powd Take 1 Scoop by mouth two (2) times a day. Takes 1 tbsp      methocarbamoL (ROBAXIN) 500 mg tablet Take 500 mg by mouth three (3) times daily as needed for Muscle Spasm(s).       OTHER Take 500 mg by mouth two (2) times a day. Tumeric      fluticasone propionate (FLONASE) 50 mcg/actuation nasal spray 2 Sprays by Both Nostrils route daily as needed for Rhinitis. simvastatin (ZOCOR) 20 mg tablet Take 20 mg by mouth nightly. amLODIPine (NORVASC) 5 mg tablet Take 5 mg by mouth daily. omega-3 fatty acids-vitamin e 1,000 mg cap Take 1 Cap by mouth daily. dicyclomine (BENTYL) 10 mg capsule Take 10 mg by mouth four (4) times daily as needed. !! levothyroxine (SYNTHROID) 88 mcg tablet Take 44 mcg by mouth Daily (before breakfast). Takes 1/2 a tablet one day out of the week. furosemide (LASIX) 20 mg tablet Take 40 mg by mouth daily. Takes 2 tablets      cetirizine (ZYRTEC) 10 mg tablet Take 10 mg by mouth daily as needed for Allergies. apixaban (ELIQUIS) 5 mg tablet Take 5 mg by mouth two (2) times a day. !! - Potential duplicate medications found. Please discuss with provider.        per medical continuation form      -Follow up in office in 2 weeks      Signed:  Willie Klein NP  Orthopaedic Nurse Practitioner    10/3/2022  3:39 PM

## 2022-10-03 NOTE — PERIOP NOTES
No    test   for  covid no s/s/   took  vaccine     rare to occassional  mask  usesage. Mepilex   sacrum border preventatively applied  skin intact. Edematous   legs   no pitting. Pcn  allergy  will get    vanc.    0715 time out     site signed   all in agrement of  left knee procedure   left canal adductor    block   done  by dr. Mode Nova and dr. Keiko Cruz. Nasal 2 liters in place and monitor on for block. Propofol   50mg given by dr. Mode Nova.     Vss.

## 2022-10-03 NOTE — H&P
Date of Surgery Update:  Mc Dorsey was seen and examined on the day of surgery prior to the procedure. There were no significant clinical changes since the completion of the History and Physical.    Exam today prior to surgery showed no acute cardiac findings, no respiratory difficulty, and no abdominal complaints or pain. This patient is not a candidate for TXA. The patient was counseled at length about the risks of thiago Covid-19 during their perioperative period and any recovery window from their procedure. The patient was made aware that thiago Covid-19  may worsen their prognosis for recovering from their procedure and lend to a higher morbidity and/or mortality risk. All material risks, benefits, and reasonable alternatives including postponing the procedure were discussed. The patient does wish to proceed with the procedure at this time. Documentation of Medical Necessity:    Symptoms: pain with activity and at rest, antalgia, interferes with ADLs    Conservative Treatment: activity modification, multiple medications, injection    Physical Findings: pain at joint line, antalgia on ambulation, crepitation    See PCP/Cardiology/PAT/Anesthesia record for cardiopulmonary evaluation. Imaging: significant OA, sclerosis and osteophytes    Indications:   Failure of conservative treatments with daily pain and functional limitations. Appropriate imaging demonstrating significant disease. Appropriate physical findings consistent with significant degenerative joint disease. All pertinent risks, benefits, and alternatives to operative management including continued conservative care were explained at length. The patient has elected to proceed with appropriately indicated and medically necessary total joint arthroplasty. They understand no guarantees can be given about the outcome.     *They will be admitted as an inpatient because of the need for intensive therapy and post-anesthesia monitoring.     Signed By: JIM Johnson     October 3, 2022 7:46 AM

## 2022-10-03 NOTE — ANESTHESIA POSTPROCEDURE EVALUATION
Procedure(s):  LEFT TOTAL KNEE ARTHROPLASTY WITH NAVIGATION. general, regional    Anesthesia Post Evaluation      Multimodal analgesia: multimodal analgesia used between 6 hours prior to anesthesia start to PACU discharge  Patient location during evaluation: PACU  Level of consciousness: sleepy but conscious  Pain management: adequate  Airway patency: patent  Anesthetic complications: no  Cardiovascular status: acceptable  Respiratory status: acceptable  Hydration status: acceptable  Comments: +Post-Anesthesia Evaluation and Assessment    Patient: Pierre Haji MRN: [id number]  SSN: [id number]   YOB: 1945  Age: 76 y.o. Sex: female      Cardiovascular Function/Vital Signs    BP (P) 138/61 (BP 1 Location: Right upper arm, BP Patient Position: At rest;Semi fowlers)   Pulse 65   Temp (P) 36.4 °C (97.5 °F)   Resp 10   Ht 5' 2\" (1.575 m)   Wt 72.8 kg (160 lb 7.9 oz)   SpO2 98%   BMI 29.35 kg/m²     Patient is status post Procedure(s) with comments:  LEFT TOTAL KNEE ARTHROPLASTY WITH NAVIGATION - (GENERAL WITH BLOCK). Nausea/Vomiting: Controlled. Postoperative hydration reviewed and adequate. Pain:  Pain Scale 1: (P) Numeric (0 - 10) (10/03/22 0945)  Pain Intensity 1: (P) 3 (10/03/22 0945)   Managed. Neurological Status:   Neuro (WDL): Exceptions to WDL (10/03/22 0930)   At baseline. Mental Status and Level of Consciousness: Arousable. Pulmonary Status:   O2 Device: (P) None (Room air) (10/03/22 0945)   Adequate oxygenation and airway patent. Complications related to anesthesia: None    Post-anesthesia assessment completed. No concerns.     Signed By: Delroy Martinez MD    10/3/2022  Post anesthesia nausea and vomiting:  controlled  Final Post Anesthesia Temperature Assessment:  Normothermia (36.0-37.5 degrees C)      INITIAL Post-op Vital signs:   Vitals Value Taken Time   /61 10/03/22 0945   Temp 36.4 °C (97.6 °F) 10/03/22 0915   Pulse 65 10/03/22 0959   Resp 13 10/03/22 0959   SpO2 95 % 10/03/22 0959   Vitals shown include unvalidated device data.

## 2022-10-03 NOTE — DISCHARGE INSTRUCTIONS
Discharge Instructions:  Phyllistine Ace    Surgery: LEFT TOTAL KNEE ARTHROPLASTY WITH NAVIGATION  Surgeon:   Dr. Abeba Tobias   Surgery Date:  10/3/2022    To relieve pain:  Use ice/gel packs.    -Put the ice pack directly over the wound, or anywhere you are hurting or swollen.   -To control pain and swelling, keep ice on regularly, especially after physical activity.  -The packs should stay cold for 3-4 hours. When it is not cold anymore, rotate with the packs in the freezer. Elevate your leg. This will also keep swelling down. Rest for at least 20 minutes between activity or exercises. To keep track of your pain medications, write down what you take and when you take it. The last dose of pain medication you got in the hospital was:     Medication    Dose    Date & Time      Choose your medications based on the pain scale below: To keep your pain under control, take Tylenol every 6 hours for 14 days - even if you feel like you dont need it. For mild to moderate pain (4-6 on pain scale), take one pain pill every 4 hours or as instructed. For severe pain (7-10 on pain scale), take two pain pills every 4 hours or as instructed. To prevent nausea, take your pain medications with food. Pain Scale              As your pain lessens:    Slowly start taking less pain medication. You may do this by waiting longer between doses or by taking smaller doses. Stop using the pain medications as soon as you no longer need it, usually in 2-3 weeks. Eliquis  To prevent blood clots, you will need to continue taking Eliquis. To prevent stomach upset or bleeding:  Do not take non-steroidal anti-inflammatory medications (Ibuprofen, Advil, Motrin, Naproxen, etc.)   Take Pepcid 20 mg twice a day, or a similar home medication, while you are taking a blood thinner. Keep your waterproof dressing in place.  It will be removed by your surgeon during your follow-up appointment in 2 weeks. You may need to change the dressing if you are having drainage to where the dressing is no longer intact. You will be given an extra dressing to use at home. You will have some swelling, warmth, and bruising around the incision and up and down the leg after surgery. This will may get worse in the first few days you are home and will slowly get better over the next few weeks. You may shower with this dressing over your wound. After showering pat the dressing dry. DO NOT's:  Do not rub your surgical wound  Do not put lotion or oils on your wound. Do not take a tub bath or go swimming until your doctor says it is ok. To increase and promote healing:  Stop Smoking (or at least cut back on smoking). Eat a well-balanced diet. High in protein and Vitamin C. If you have a poor appetite, supplement your diet by drinking Ensure, Glucerna or Sawyer Instant Breakfast for the next 30 days     If you are a diabetic, control you blood sugars to prevent infection and help your wound heal.                     Prevent Infection:    Wash your hands                       -This is the most important thing you or your caregiver can do. -Wash your hands with soap and water (or use the hand ) before you touch any wounds. Shower  -Use the antibacterial soap we gave you when you take a shower.   -Shower with this soap until your wounds are healed. Use Clean Sheets   -Put freshly cleaned sheets on your bed after surgery.   -To keep the surgery site clean, do not allow pets to sleep with you while your wound is still healing. To prevent constipation, stay active and drink plenty of fluid. While using pain medications, you should also take stool softeners and laxatives, such as Pericolace and Miralax.        If you are having too many bowel movements, then you may need to stop taking the laxatives. You should have a bowel movement 3-4 days after surgery and then at least every other day while on pain medication. To improve your recovery, you must be active! Use your walker and take short walks (in your home) about every 2 hours during the day. Try to increase how far you walk each day. You can put as much weight on your leg as you can tolerate while walking. Home health physical therapy will come to your home the day after you leave the hospital. The therapist will visit about 4 times over the next couple of weeks to teach you exercises, how to get out of bed and how to safely walk in your home. NO DRIVING until your surgeon tells you it is ok. You can return to work when cleared by a physician. Please call your physician immediately if you have:  Constant bleeding from your wound. Increasing redness or swelling around your wound (some warmth, bruising and swelling is normal). Change in wound drainage (increase in amount, color, or bad smell). Change in mental status (unusual behavior). Temperature over 101.5 degrees Fahrenheit   Pain or redness in the calf (back of your lower leg)  Increased swelling of the thigh, ankle, calf, or foot. Emergency! CALL 911 if you have:  Shortness of breath  Chest pain when you cough or taking a deep breath        Please call your surgeons office at [phone] for a follow up appointment in 2 weeks. You should call as soon as you get home or the next day after discharge. If you have questions or concerns during normal business hours, you may reach Dr. Nathan Rahman' Team at [phone].         Instructions for 24 hours after receiving General Anesthesia or Intravenous Sedation,   and while taking prescription Narcotics    Common side effects associated with each of these medications includes:  - Drowsiness, dizziness, euphoria, sleepiness or confusion  - Impaired memory recall  - Unsteady gait, loss of fine muscle control and delayed reaction time  - Visual disturbances, difficulty focusing, blurred vision    You may experience some of these side effects or you may not be aware of subtle changes in your behavior or reaction time. Because you received these medications, we are giving you the following instructions. Discharge Instructions:   - Someone should be with you for the next 24 hours  - For your own safety, a responsible adult must drive you home  - Do not consume alcoholic beverages   - Do not make important personal, legal or business decisions for 24 hours  - Move slowly and carefully, do not make sudden position changes. Be alert for dizziness or lightheadedness and move accordingly. - Do not operate equipment for 24 hours - [employer], power tools, Kitchen accessories: stove, etc.  - If you have not urinated within 8 hours after discharge, please contact your surgeon's office.

## 2022-10-03 NOTE — ANESTHESIA PROCEDURE NOTES
Peripheral Block    Start time: 10/3/2022 7:15 AM  End time: 10/3/2022 7:19 AM  Performed by: Namita Akins MD  Authorized by: Namita Akins MD       Pre-procedure: Indications: at surgeon's request and post-op pain management    Preanesthetic Checklist: patient identified, risks and benefits discussed, site marked, timeout performed, anesthesia consent given and patient being monitored      Block Type:   Block Type: Adductor canal block  Laterality:  Left  Monitoring:  Standard ASA monitoring, continuous pulse ox, frequent vital sign checks, heart rate, responsive to questions and oxygen  Injection Technique:  Single shot  Procedures: ultrasound guided    Patient Position: supine  Prep: chlorhexidine    Location:  Lower thigh  Needle Type:  Stimuplex  Needle Gauge:  22 G  Needle Localization:  Ultrasound guidance  Med Admin Time: 10/3/2022 8:03 AM    Assessment:  Number of attempts:  1  Injection Assessment:  Incremental injection every 5 mL, local visualized surrounding nerve on ultrasound, negative aspiration for blood, no intravascular symptoms, no paresthesia and ultrasound image on chart  Patient tolerance:  Patient tolerated the procedure well with no immediate complications  Under ultrasound guidance, a 22 gauge needle was inserted and placed in close proximity to the nerve. Ultrasound was also used to visualize the spread of the anesthetic in close proximity to the nerve being blocked. The nerve appeared anatomicaly normal.  A permanent ultrasound image was saved in the patient's record.

## 2022-10-03 NOTE — BRIEF OP NOTE
Brief Postoperative Note    Patient: Kaiser Lanza  YOB: 1945  MRN: [id number]    Date of Procedure: 10/3/2022     Pre-Op Diagnosis: LEFT KNEE OSTEOARTHRITIS    Post-Op Diagnosis: Same as preoperative diagnosis. Procedure(s):  LEFT TOTAL KNEE ARTHROPLASTY WITH NAVIGATION    Surgeon(s):  Arabella Benjaimn MD    Surgical Assistant: Physician Assistant: Ivy Bloch, PA  Surg Asst-1: Scott Orantes    Anesthesia: General     Estimated Blood Loss (mL): Minimal    Complications: None    Specimens: * No specimens in log *     Implants:   Implant Name Type Inv.  Item Serial No.  Lot No. LRB No. Used Action   COMPONENT TIB TY 2F/2T KNEE POROUS TRULIANT - [id number]  COMPONENT TIB TY 2F/2T KNEE POROUS TRULIANT [id number] [employer] NA Left 1 Implanted   SCREW BONE L40MM DIA6.5MM FOR NOVATION CRWN CUP ACET SHELL - [id number]  SCREW BONE L40MM DIA6.5MM FOR NOVATION CRWN CUP ACET SHELL [id number] [employer] NA Left 1 Implanted   SCREW BONE L40MM DIA6.5MM FOR NOVATION CRWN CUP ACET SHELL - [id number]  SCREW BONE L40MM DIA6.5MM FOR NOVATION CRWN CUP ACET SHELL [id number] [employer] NA Left 1 Implanted   INSERT TIB CR 2 10 MM TRULIANT - [id number]  INSERT TIB CR 2 10 MM TRULIANT [id number] [employer] NA Left 1 Implanted   COMPONENT FEM CR 2 LT KNEE POROUS TRULIANT - [id number]  COMPONENT FEM CR 2 LT KNEE POROUS TRULIANT [id number] [employer] NA Left 1 Implanted       Drains: * No LDAs found *    Findings: n/a    Electronically Signed by JIM Pepe on 10/3/2022 at 8:43 AM

## 2022-10-03 NOTE — PERIOP NOTES
Handoff Report from Operating Room to PACU    Report received from KIM Johnson RN and ELIZABETH Cancino MD regarding Cody Mccann. Surgeon(s):  Lucille Guerrero MD  And Procedure(s) (LRB):  LEFT TOTAL KNEE ARTHROPLASTY WITH NAVIGATION (Left)  confirmed   with allergies, dressings, and local anesthetic discussed. Anesthesia type, drugs, patient history, complications, estimated blood loss, vital signs, intake and output, and last pain medication, lines, reversal medications, and temperature were reviewed.

## 2022-10-03 NOTE — H&P
Walker Guzman MD - Adult Reconstruction and Total Joint Replacement     Orthopaedic History and Physical        NAME: Jay Moraes       :  1945       MRN:  [id number]        Subjective:   Patient ID: Jay Moraes is a 76 y.o. female. The patient is here today for a L knee steroid injection. She received a previous injection a few months ago, which she reported provided relief. She denies taking any arthritis medication, or anti-inflammatories, because of her blood thinners. However, she is currently on 200 mg Celebrex once a day, and uses lots of Tylenol. Of note, she is on Eliquis. The patient is considering surgery in an October date.        Patient Active Problem List    Diagnosis Date Noted    Atrial fibrillation ([address] 2022    DJD (degenerative joint disease) of knee 2014    Unspecified hypothyroidism      Past Medical History:   Diagnosis Date    Aortic stenosis     secondary to rheumatic fever as a child, s/p AVR with porcine valve 10/09    Arrhythmia 2021    afib/flutter    Arrhythmia 2020    SVT    Arthritis     generalized    Chronic pain     d/t arthritis    Degenerative disc disease, cervical     Diverticulosis     GERD (gastroesophageal reflux disease)     Heart failure ([address]     21 CHF    Ill-defined condition     diverticulosis    Other and unspecified hyperlipidemia     Rheumatic fever     Sleep apnea     BiPAP    Stroke ([address]     5 days post post AOVR; no recurrence    Unspecified essential hypertension     Unspecified hypothyroidism       Past Surgical History:   Procedure Laterality Date    DELIVERY       x3    FOOT/TOES SURGERY PROC UNLISTED      bilateral    HX AORTIC VALVE REPLACEMENT      10/09    HX APPENDECTOMY      HX CATARACT REMOVAL      bilateral and lens implant    HX GYN      3 c section    HX HYSTERECTOMY      HX KNEE REPLACEMENT Right 2014    HX ORTHOPAEDIC  2014    tendon realigment, bunionectomy      Prior to Admission medications Medication Sig Start Date End Date Taking? Authorizing Provider   levothyroxine (SYNTHROID) 88 mcg tablet Take 88 mcg by mouth Daily (before breakfast). Takes 6 days out of the week. Takes a 1/2 pill (44 mcg) one day out of the week. Yes Provider, Historical   metoprolol succinate (TOPROL-XL) 25 mg XL tablet Take 12.5 mg by mouth daily. Takes 1/2 a tablet   Yes Provider, Historical   dilTIAZem ER (CARDIZEM LA) 120 mg tablet Take 120 mg by mouth nightly. Yes Provider, Historical   omeprazole (PRILOSEC) 40 mg capsule Take 40 mg by mouth daily. Yes Provider, Historical   celecoxib (CELEBREX) 200 mg capsule Take 200 mg by mouth daily. Yes Provider, Historical   cholecalciferol, vitamin D3, 50 mcg (2,000 unit) tab Take 2,000 Units by mouth daily. Yes Provider, Historical   Lactobacillus acidophilus (PROBIOTIC PO) Take 1 Tablet by mouth daily. Yes Provider, Historical   psyllium (METAMUCIL) powd Take 1 Scoop by mouth two (2) times a day. Takes 1 tbsp   Yes Provider, Historical   methocarbamoL (ROBAXIN) 500 mg tablet Take 500 mg by mouth three (3) times daily as needed for Muscle Spasm(s). Yes Provider, Historical   OTHER Take 500 mg by mouth two (2) times a day. Tumeric   Yes Provider, Historical   fluticasone propionate (FLONASE) 50 mcg/actuation nasal spray 2 Sprays by Both Nostrils route daily as needed for Rhinitis. Yes Provider, Historical   simvastatin (ZOCOR) 20 mg tablet Take 20 mg by mouth nightly. Yes Other, MD Juliana   amLODIPine (NORVASC) 5 mg tablet Take 5 mg by mouth daily. Yes Other, MD Juliana   omega-3 fatty acids-vitamin e 1,000 mg cap Take 1 Cap by mouth daily. Yes Other, MD Juliana   dicyclomine (BENTYL) 10 mg capsule Take 10 mg by mouth four (4) times daily as needed. 12/17/09  Yes Provider, Historical   levothyroxine (SYNTHROID) 88 mcg tablet Take 44 mcg by mouth Daily (before breakfast). Takes 1/2 a tablet one day out of the week.     Provider, Historical   furosemide (LASIX) 20 mg tablet Take 40 mg by mouth daily. Takes 2 tablets    Provider, Historical   cetirizine (ZYRTEC) 10 mg tablet Take 10 mg by mouth daily as needed for Allergies. Provider, Historical   apixaban (ELIQUIS) 5 mg tablet Take 5 mg by mouth two (2) times a day. Provider, Historical     Allergies   Allergen Reactions    Penicillins Swelling     Difficulty breatheing    Erythromycin Nausea and Vomiting      Social History     Tobacco Use    Smoking status: Never    Smokeless tobacco: Never   Substance Use Topics    Alcohol use: Not Currently     Comment: glass of wine every few months      Family History   Problem Relation Age of Onset    Thyroid Disease Sister     Other Other         brain aneurysm in father, paternal aunt and uncle and sister        REVIEW OF SYSTEMS: A comprehensive review of systems was negative except for that written in the HPI. Previous films are reviewed: She has grade 4 tricompartmental arthritis. Right total knee replacement in good position with no signs of wear or loosening. Large Joint Arthrocentesis: L knee    Consent given by: patient  Timeout: Immediately prior to procedure a time out was called to verify the correct patient, procedure, equipment, support staff and site/side marked as required   Supporting Documentation  Indications: pain   Procedure Details  Location: Knee L knee   Approach: anterolateral  Patient tolerance: patient tolerated the procedure well with no immediate complications    Medications administered: 4 mL lidocaine 1 %; 2 mL Triamcinolone 40 MG/ML    Patient Education: Cortisone Flare Risk Discussed and Diabetes Risk Discussed    Diagnosis:   No diagnosis found. New Prescriptions:  No orders of the defined types were placed in this encounter.     ALLERGIES:  Allergies   Allergen Reactions   Penicillins Swelling   Difficulty breatheing    Erythromycin Nausea And Vomiting       MEDICATIONS    Current Outpatient Medications:   amLODIPine (NORVASC) 5 MG tablet, , Disp: , Rfl:   Apixaban 5 MG tablet, Take 5 mg by mouth 2 times daily, Disp: , Rfl:   celecoxib (CeleBREX) 100 MG capsule, Take 200 mg by mouth in the morning., Disp: , Rfl:   cetirizine (ZyrTEC) 10 MG tablet, Take 5 mg by mouth, Disp: , Rfl:   Cholecalciferol 25 MCG (1000 UT) capsule, Take by mouth, Disp: , Rfl:   dicyclomine (BENTYL) 10 MG capsule, , Disp: , Rfl:   fluticasone (FLONASE) 50 MCG/ACT nasal spray, Administer 2 sprays into affected nostril(s) daily, Disp: , Rfl:   furosemide (LASIX) 20 MG tablet, Take 1 tablet by mouth daily, Disp: , Rfl:   levothyroxine (SYNTHROID, LEVOTHROID) 88 MCG tablet, , Disp: , Rfl:   methocarbamol (Robaxin) 500 MG tablet, Take 1 tablet (500 mg total) by mouth as needed in the morning and 1 tablet (500 mg total) as needed at noon and 1 tablet (500 mg total) as needed in the evening for muscle spasms. , Disp: 60 tablet, Rfl: 1  metoprolol succinate XL (TOPROL-XL) 25 MG 24 hr tablet, , Disp: , Rfl:   omeprazole (PriLOSEC) 40 MG capsule, , Disp: , Rfl:   simvastatin (ZOCOR) 20 MG tablet, , Disp: , Rfl:     MEDICAL HX  Past Medical History:   Diagnosis Date   GERD (gastroesophageal reflux disease)   Hyperlipidemia   Hypertension   Osteoarthritis   Sleep apnea     SURGICAL HX   Past Surgical History:   Procedure Laterality Date   CARDIAC VALVE REPLACEMENT   FOOT SURGERY   JOINT REPLACEMENT   KNEE SURGERY       VITALS  There were no vitals filed for this visit. Plan:  I refilled the Celebrex. I will schedule a knee replacement in October, but I asked her if she could talk with Dr. Josef Dodson about stopping Eliquis before the surgery.      JIM Cruz

## 2022-10-03 NOTE — PROGRESS NOTES
CM informed pt to be a day 0 surgery d/c. Pt in need of [place] services. CM made room visit with patient who reported she has already been contacted by [facility] for [place] services. Pt confirmed she is agreeable to [facility]. FOC signed and placed on bedside chart. CM has sent [place] orders to [facility]. Pt confirmed she has a RW at home. Family at [facility] to provide transport at d/c.      [address] [address] [place] [address], Arlin Srivastava, [address]

## 2022-10-03 NOTE — PROGRESS NOTES
Problem: Mobility Impaired (Adult and Pediatric)  Goal: *Acute Goals and Plan of Care (Insert Text)  Description: FUNCTIONAL STATUS PRIOR TO ADMISSION: Patient was independent and active without use of DME. Denies history of falls. Still driving. HOME SUPPORT PRIOR TO ADMISSION: The patient lived alone however states her sister will be staying with her/assisting at discharge. Physical Therapy Goals  Initiated 10/3/2022    1. Patient will move from supine to sit and sit to supine , scoot up and down, and roll side to side in bed with independence within 4 days. 2. Patient will perform sit to stand with modified independence within 4 days. 3. Patient will ambulate with modified independence for 300 feet with the least restrictive device within 4 days. 5. Patient will perform home exercise program per protocol with independence within 4 days. 6. Patient will demonstrate AROM 0-90 degrees in operative joint within 4 days. Outcome: Progressing Towards Goal   PHYSICAL THERAPY EVALUATION  Patient: Kristie Mcguire (46 y.o. female)  Date: 10/3/2022  Primary Diagnosis: Total knee replacement status [Z96.659]  Procedure(s) (LRB):  LEFT TOTAL KNEE ARTHROPLASTY WITH NAVIGATION (Left) Day of Surgery   Precautions:   WBAT    ASSESSMENT  Based on the objective data described below, the patient presents with increased pain levels, altered gait pattern, impaired activity tolerance, generalized weakness, and overall impaired functional mobility on POD 0 following L TKA. Pt mobilized with SBAx1 throughout including during bed mobility, sit<>stand transfers, ambulation with RW support, and car transfer. Pt with mild antalgia as well as step-to gait pattern however no overt LOB noted. Pt tolerated therapy session well however c/o LLE \"soreness\" throughout. Anticipate pt will continue to progress well with therapy and be appropriate to return home w/ assist of family and HHPT at discharge.      Current Level of Function Impacting Discharge (mobility/balance): SBAx1 with use of rolling walker    Functional Outcome Measure: The patient scored 70/100 on the Barthel Index outcome measure which is indicative of mild impairments in ADLs and functional mobility. Other factors to consider for discharge: lives alone, pain levels     Patient will benefit from skilled therapy intervention to address the above noted impairments. PLAN :  Recommendations and Planned Interventions: bed mobility training, transfer training, gait training, therapeutic exercises, patient and family training/education, and therapeutic activities      Frequency/Duration: Patient will be followed by physical therapy:  twice daily to address goals. Recommendation for discharge: (in order for the patient to meet his/her long term goals)  Physical therapy at least 2 days/week in the home AND ensure assist and/or supervision for safety with functional mobility and ADLs    This discharge recommendation:  Has been made in collaboration with the attending provider and/or case management    IF patient discharges home will need the following DME: patient owns DME required for discharge         SUBJECTIVE:   Patient stated It's just so sore.     OBJECTIVE DATA SUMMARY:   HISTORY:    Past Medical History:   Diagnosis Date    Aortic stenosis     secondary to rheumatic fever as a child, s/p AVR with porcine valve 10/09    Arrhythmia 04/2021    afib/flutter    Arrhythmia 05/2020    SVT    Arthritis     generalized    Chronic pain     d/t arthritis    Degenerative disc disease, cervical     Diverticulosis     GERD (gastroesophageal reflux disease)     Heart failure ([address]     12/27/21 CHF    Ill-defined condition     diverticulosis    Other and unspecified hyperlipidemia     Rheumatic fever     Sleep apnea     BiPAP    Stroke ([address]     5 days post post AOVR; no recurrence    Unspecified essential hypertension     Unspecified hypothyroidism      Past Surgical History: Procedure Laterality Date    DELIVERY       x3    FOOT/TOES SURGERY PROC UNLISTED      bilateral    HX AORTIC VALVE REPLACEMENT      10/09    HX APPENDECTOMY      HX CATARACT REMOVAL      bilateral and lens implant    HX GYN      3 c section    HX HYSTERECTOMY      HX KNEE REPLACEMENT Right 2014    HX ORTHOPAEDIC  2014    tendon realigment, bunionectomy       Personal factors and/or comorbidities impacting plan of care:     Home Situation  Home Environment: Private residence  # Steps to Enter: 0  One/Two Story Residence: One story  Living Alone: Yes  Support Systems: Other Family Member(s) (sister)  Current DME Used/Available at Home: Cane, straight, Walker, rolling, Grab bars, Shower chair  Tub or Shower Type: Shower    EXAMINATION/PRESENTATION/DECISION MAKING:   Critical Behavior:  Neurologic State: Alert  Orientation Level: Oriented X4  Cognition: Appropriate safety awareness, Follows commands     Hearing:     Skin:  dressing clean, dry, intact  Edema: none noted  Range Of Motion:  AROM: Generally decreased, functional                       Strength:    Strength: Generally decreased, functional                    Tone & Sensation:   Tone: Normal              Sensation: Intact               Coordination:  Coordination: Within functional limits  Vision:      Functional Mobility:  Bed Mobility:  Rolling: Stand-by assistance  Supine to Sit: Stand-by assistance  Sit to Supine: Stand-by assistance  Scooting: Stand-by assistance  Transfers:  Sit to Stand: Stand-by assistance  Stand to Sit: Stand-by assistance                       Balance:   Sitting: Intact  Standing: Impaired; With support (RW)  Standing - Static: Good;Constant support  Standing - Dynamic : Good;Constant support  Ambulation/Gait Training:  Distance (ft): 180 Feet (ft)  Assistive Device: Walker, rolling;Gait belt  Ambulation - Level of Assistance: Stand-by assistance        Gait Abnormalities: Decreased step clearance; Step to gait;Antalgic        Base of Support: Narrowed     Speed/Sally: Pace decreased (<100 feet/min)  Step Length: Left shortened;Right shortened                     Functional Measure:  Barthel Index:    Bathin  Bladder: 10  Bowels: 10  Groomin  Dressin  Feeding: 10  Mobility: 10  Stairs: 0  Toilet Use: 10  Transfer (Bed to Chair and Back): 10  Total: 70/100       The Barthel ADL Index: Guidelines  1. The index should be used as a record of what a patient does, not as a record of what a patient could do. 2. The main aim is to establish degree of independence from any help, physical or verbal, however minor and for whatever reason. 3. The need for supervision renders the patient not independent. 4. A patient's performance should be established using the best available evidence. Asking the patient, friends/relatives and nurses are the usual sources, but direct observation and common sense are also important. However direct testing is not needed. 5. Usually the patient's performance over the preceding 24-48 hours is important, but occasionally longer periods will be relevant. 6. Middle categories imply that the patient supplies over 50 per cent of the effort. 7. Use of aids to be independent is allowed. Score Interpretation (from [address] [address])    Independent   60-79 Minimally independent   40-59 Partially dependent   20-39 Very dependent   <20 Totally dependent     -Ponce Lacey., Barthel, D.W. (1965). Functional evaluation: the Barthel Index. [facility] ([address] (1997). The Barthel activities of daily living index: self-reporting versus actual performance in the old (> or = 75 years). Journal of [address] [place] [address] 45(7), 14 [facility], J.JCeeMCeeF, Bill Springer., Pj Montague. (1999). Measuring the change in disability after inpatient rehabilitation; comparison of the responsiveness of the Barthel Index and Functional [place] Measure. Journal of Neurology, Neurosurgery, and Psychiatry, 66(4), 506-001. MASHA Barber, ILEANA Christianson, & Rica Beck M.A. (2004) Assessment of post-stroke quality of life in cost-effectiveness studies: The usefulness of the Barthel Index and the EuroQoL-5D. Quality of Life Research, 15, 363-81           Physical Therapy Evaluation Charge Determination   History Examination Presentation Decision-Making   MEDIUM  Complexity : 1-2 comorbidities / personal factors will impact the outcome/ POC  MEDIUM Complexity : 3 Standardized tests and measures addressing body structure, function, activity limitation and / or participation in recreation  MEDIUM Complexity : Evolving with changing characteristics  MEDIUM Complexity : FOTO score of 26-74      Based on the above components, the patient evaluation is determined to be of the following complexity level: MEDIUM    Pain Ratin-5/10 pain in L knee    Activity Tolerance:   VSS throughout    After treatment patient left in no apparent distress:   Supine in bed, Call bell within reach, Caregiver / family present, and Side rails x 3    COMMUNICATION/EDUCATION:   The patients plan of care was discussed with: Registered nurse and Ortho NP . Fall prevention education was provided and the patient/caregiver indicated understanding., Patient/family have participated as able in goal setting and plan of care. , and Patient/family agree to work toward stated goals and plan of care.     Thank you for this referral.  Morgan Cole, PT, DPT   Time Calculation: 29 mins

## 2022-10-03 NOTE — PROGRESS NOTES
Discharge instructions reviewed with the patient. The sister at bedside. Both able to verbalize an understanding. All personal belongings with the patient. The patient left via wheelchair.

## 2022-10-03 NOTE — PERIOP NOTES
TRANSFER - OUT REPORT:    Verbal report given to Spencer Guy RN(name) on Jey Jolly being transferred to [facility](unit) for routine post - op       Report consisted of patient's Situation, Background, Assessment and   Recommendations(SBAR). Information from the following report(s) SBAR, OR Summary, Intake/Output, MAR, and Cardiac Rhythm NSR  was reviewed with the receiving nurse. Opportunity for questions and clarification was provided. Patient transported with:   Tech    Belongings bag on stretcher. Sister,Vanessa notified of transfer.

## 2023-06-21 ENCOUNTER — APPOINTMENT (OUTPATIENT)
Facility: HOSPITAL | Age: 78
DRG: 069 | End: 2023-06-21
Attending: INTERNAL MEDICINE
Payer: MEDICARE

## 2023-06-21 ENCOUNTER — APPOINTMENT (OUTPATIENT)
Facility: HOSPITAL | Age: 78
DRG: 069 | End: 2023-06-21
Payer: MEDICARE

## 2023-06-21 ENCOUNTER — HOSPITAL ENCOUNTER (INPATIENT)
Facility: HOSPITAL | Age: 78
LOS: 1 days | Discharge: HOME | DRG: 069 | End: 2023-06-22
Attending: EMERGENCY MEDICINE | Admitting: INTERNAL MEDICINE
Payer: MEDICARE

## 2023-06-21 DIAGNOSIS — I63.9 ACUTE CVA (CEREBROVASCULAR ACCIDENT) (HCC): ICD-10-CM

## 2023-06-21 DIAGNOSIS — I63.9 CEREBROVASCULAR ACCIDENT (CVA), UNSPECIFIED MECHANISM (HCC): Primary | ICD-10-CM

## 2023-06-21 LAB
ALBUMIN SERPL-MCNC: 3.9 G/DL (ref 3.5–5)
ALBUMIN/GLOB SERPL: 1 (ref 1.1–2.2)
ALP SERPL-CCNC: 103 U/L (ref 45–117)
ALT SERPL-CCNC: 21 U/L (ref 12–78)
ANION GAP SERPL CALC-SCNC: 7 MMOL/L (ref 5–15)
AST SERPL-CCNC: 22 U/L (ref 15–37)
BASOPHILS # BLD: 0 K/UL (ref 0–0.1)
BASOPHILS NFR BLD: 0 % (ref 0–1)
BILIRUB SERPL-MCNC: 0.4 MG/DL (ref 0.2–1)
BUN SERPL-MCNC: 22 MG/DL (ref 6–20)
BUN/CREAT SERPL: 21 (ref 12–20)
CALCIUM SERPL-MCNC: 9 MG/DL (ref 8.5–10.1)
CHLORIDE SERPL-SCNC: 100 MMOL/L (ref 97–108)
CO2 SERPL-SCNC: 25 MMOL/L (ref 21–32)
CREAT SERPL-MCNC: 1.06 MG/DL (ref 0.55–1.02)
DIFFERENTIAL METHOD BLD: ABNORMAL
ECHO AV AREA PEAK VELOCITY: 0.6 CM2
ECHO AV AREA VTI: 0.5 CM2
ECHO AV AREA/BSA PEAK VELOCITY: 0.4 CM2/M2
ECHO AV AREA/BSA VTI: 0.3 CM2/M2
ECHO AV MEAN GRADIENT: 33 MMHG
ECHO AV MEAN VELOCITY: 2.7 M/S
ECHO AV PEAK GRADIENT: 59 MMHG
ECHO AV PEAK VELOCITY: 3.8 M/S
ECHO AV VELOCITY RATIO: 0.26
ECHO AV VTI: 91.7 CM
ECHO BSA: 1.75 M2
ECHO LA DIAMETER INDEX: 2.05 CM/M2
ECHO LA DIAMETER: 3.5 CM
ECHO LA VOL 4C: 23 ML (ref 22–52)
ECHO LA VOL 4C: 23 ML (ref 22–52)
ECHO LV E' LATERAL VELOCITY: 9 CM/S
ECHO LV E' SEPTAL VELOCITY: 5 CM/S
ECHO LVOT AREA: 2.3 CM2
ECHO LVOT AV VTI INDEX: 0.23
ECHO LVOT DIAM: 1.7 CM
ECHO LVOT MEAN GRADIENT: 2 MMHG
ECHO LVOT PEAK GRADIENT: 4 MMHG
ECHO LVOT PEAK VELOCITY: 1 M/S
ECHO LVOT STROKE VOLUME INDEX: 28.1 ML/M2
ECHO LVOT SV: 48.1 ML
ECHO LVOT VTI: 21.2 CM
ECHO MV A VELOCITY: 0.91 M/S
ECHO MV AREA VTI: 1.3 CM2
ECHO MV E DECELERATION TIME (DT): 259.4 MS
ECHO MV E VELOCITY: 1.22 M/S
ECHO MV E/A RATIO: 1.34
ECHO MV E/E' LATERAL: 13.56
ECHO MV E/E' RATIO (AVERAGED): 18.98
ECHO MV E/E' SEPTAL: 24.4
ECHO MV LVOT VTI INDEX: 1.75
ECHO MV MAX VELOCITY: 1.3 M/S
ECHO MV MEAN GRADIENT: 4 MMHG
ECHO MV MEAN VELOCITY: 0.9 M/S
ECHO MV PEAK GRADIENT: 7 MMHG
ECHO MV VTI: 37.1 CM
ECHO PV MAX VELOCITY: 1.1 M/S
ECHO PV PEAK GRADIENT: 5 MMHG
ECHO RV TAPSE: 1.5 CM (ref 1.7–?)
EOSINOPHIL # BLD: 0.1 K/UL (ref 0–0.4)
EOSINOPHIL NFR BLD: 2 % (ref 0–7)
ERYTHROCYTE [DISTWIDTH] IN BLOOD BY AUTOMATED COUNT: 14.6 % (ref 11.5–14.5)
GLOBULIN SER CALC-MCNC: 3.9 G/DL (ref 2–4)
GLUCOSE SERPL-MCNC: 102 MG/DL (ref 65–100)
HCT VFR BLD AUTO: 39.2 % (ref 35–47)
HGB BLD-MCNC: 12.5 G/DL (ref 11.5–16)
IMM GRANULOCYTES # BLD AUTO: 0 K/UL (ref 0–0.04)
IMM GRANULOCYTES NFR BLD AUTO: 0 % (ref 0–0.5)
INR PPP: 1 (ref 0.9–1.1)
LYMPHOCYTES # BLD: 2.2 K/UL (ref 0.8–3.5)
LYMPHOCYTES NFR BLD: 35 % (ref 12–49)
MAGNESIUM SERPL-MCNC: 2 MG/DL (ref 1.6–2.4)
MCH RBC QN AUTO: 26.9 PG (ref 26–34)
MCHC RBC AUTO-ENTMCNC: 31.9 G/DL (ref 30–36.5)
MCV RBC AUTO: 84.5 FL (ref 80–99)
MONOCYTES # BLD: 0.7 K/UL (ref 0–1)
MONOCYTES NFR BLD: 12 % (ref 5–13)
NEUTS SEG # BLD: 3.2 K/UL (ref 1.8–8)
NEUTS SEG NFR BLD: 51 % (ref 32–75)
NRBC # BLD: 0 K/UL (ref 0–0.01)
NRBC BLD-RTO: 0 PER 100 WBC
PLATELET # BLD AUTO: 169 K/UL (ref 150–400)
PMV BLD AUTO: 11.2 FL (ref 8.9–12.9)
POTASSIUM SERPL-SCNC: 4 MMOL/L (ref 3.5–5.1)
PROT SERPL-MCNC: 7.8 G/DL (ref 6.4–8.2)
PROTHROMBIN TIME: 10.9 SEC (ref 9–11.1)
RBC # BLD AUTO: 4.64 M/UL (ref 3.8–5.2)
SODIUM SERPL-SCNC: 132 MMOL/L (ref 136–145)
TROPONIN I SERPL HS-MCNC: 12 NG/L (ref 0–51)
TSH SERPL DL<=0.05 MIU/L-ACNC: 3.68 UIU/ML (ref 0.36–3.74)
WBC # BLD AUTO: 6.2 K/UL (ref 3.6–11)

## 2023-06-21 PROCEDURE — 93005 ELECTROCARDIOGRAM TRACING: CPT | Performed by: EMERGENCY MEDICINE

## 2023-06-21 PROCEDURE — 80053 COMPREHEN METABOLIC PANEL: CPT

## 2023-06-21 PROCEDURE — 99222 1ST HOSP IP/OBS MODERATE 55: CPT | Performed by: INTERNAL MEDICINE

## 2023-06-21 PROCEDURE — 85025 COMPLETE CBC W/AUTO DIFF WBC: CPT

## 2023-06-21 PROCEDURE — 6360000004 HC RX CONTRAST MEDICATION: Performed by: FAMILY MEDICINE

## 2023-06-21 PROCEDURE — 85610 PROTHROMBIN TIME: CPT

## 2023-06-21 PROCEDURE — 70498 CT ANGIOGRAPHY NECK: CPT

## 2023-06-21 PROCEDURE — 71045 X-RAY EXAM CHEST 1 VIEW: CPT

## 2023-06-21 PROCEDURE — 70450 CT HEAD/BRAIN W/O DYE: CPT

## 2023-06-21 PROCEDURE — 6370000000 HC RX 637 (ALT 250 FOR IP): Performed by: INTERNAL MEDICINE

## 2023-06-21 PROCEDURE — 93308 TTE F-UP OR LMTD: CPT

## 2023-06-21 PROCEDURE — 99285 EMERGENCY DEPT VISIT HI MDM: CPT

## 2023-06-21 PROCEDURE — 84484 ASSAY OF TROPONIN QUANT: CPT

## 2023-06-21 PROCEDURE — 1100000003 HC PRIVATE W/ TELEMETRY

## 2023-06-21 PROCEDURE — 36415 COLL VENOUS BLD VENIPUNCTURE: CPT

## 2023-06-21 PROCEDURE — 84443 ASSAY THYROID STIM HORMONE: CPT

## 2023-06-21 PROCEDURE — 6370000000 HC RX 637 (ALT 250 FOR IP): Performed by: EMERGENCY MEDICINE

## 2023-06-21 PROCEDURE — 70551 MRI BRAIN STEM W/O DYE: CPT

## 2023-06-21 PROCEDURE — 6370000000 HC RX 637 (ALT 250 FOR IP): Performed by: STUDENT IN AN ORGANIZED HEALTH CARE EDUCATION/TRAINING PROGRAM

## 2023-06-21 PROCEDURE — 83735 ASSAY OF MAGNESIUM: CPT

## 2023-06-21 RX ORDER — VIT C/B6/B5/MAGNESIUM/HERB 173 50-5-6-5MG
1000 CAPSULE ORAL DAILY
COMMUNITY

## 2023-06-21 RX ORDER — POLYETHYLENE GLYCOL 3350 17 G/17G
17 POWDER, FOR SOLUTION ORAL DAILY PRN
Status: DISCONTINUED | OUTPATIENT
Start: 2023-06-21 | End: 2023-06-22 | Stop reason: HOSPADM

## 2023-06-21 RX ORDER — ASPIRIN 325 MG
325 TABLET ORAL ONCE
Status: COMPLETED | OUTPATIENT
Start: 2023-06-21 | End: 2023-06-21

## 2023-06-21 RX ORDER — LIDOCAINE 4 G/G
1 PATCH TOPICAL DAILY
Status: DISCONTINUED | OUTPATIENT
Start: 2023-06-21 | End: 2023-06-22 | Stop reason: HOSPADM

## 2023-06-21 RX ORDER — POTASSIUM CHLORIDE 20 MEQ/1
20 TABLET, EXTENDED RELEASE ORAL DAILY
Status: DISCONTINUED | OUTPATIENT
Start: 2023-06-21 | End: 2023-06-22 | Stop reason: HOSPADM

## 2023-06-21 RX ORDER — AMLODIPINE BESYLATE 5 MG/1
5 TABLET ORAL DAILY
Status: DISCONTINUED | OUTPATIENT
Start: 2023-06-22 | End: 2023-06-22 | Stop reason: HOSPADM

## 2023-06-21 RX ORDER — FUROSEMIDE 40 MG/1
40 TABLET ORAL DAILY
Status: DISCONTINUED | OUTPATIENT
Start: 2023-06-21 | End: 2023-06-22 | Stop reason: HOSPADM

## 2023-06-21 RX ORDER — ASPIRIN 81 MG/1
81 TABLET, CHEWABLE ORAL DAILY
Status: DISCONTINUED | OUTPATIENT
Start: 2023-06-22 | End: 2023-06-22 | Stop reason: HOSPADM

## 2023-06-21 RX ORDER — ONDANSETRON 4 MG/1
4 TABLET, ORALLY DISINTEGRATING ORAL EVERY 8 HOURS PRN
Status: DISCONTINUED | OUTPATIENT
Start: 2023-06-21 | End: 2023-06-22 | Stop reason: HOSPADM

## 2023-06-21 RX ORDER — CHLORAL HYDRATE 500 MG
1000 CAPSULE ORAL DAILY
COMMUNITY

## 2023-06-21 RX ORDER — METOPROLOL SUCCINATE 25 MG/1
12.5 TABLET, EXTENDED RELEASE ORAL DAILY
Status: DISCONTINUED | OUTPATIENT
Start: 2023-06-21 | End: 2023-06-22 | Stop reason: HOSPADM

## 2023-06-21 RX ORDER — POTASSIUM CHLORIDE 750 MG/1
20 TABLET, FILM COATED, EXTENDED RELEASE ORAL DAILY
COMMUNITY

## 2023-06-21 RX ORDER — PANTOPRAZOLE SODIUM 40 MG/1
40 TABLET, DELAYED RELEASE ORAL
Status: DISCONTINUED | OUTPATIENT
Start: 2023-06-22 | End: 2023-06-22 | Stop reason: HOSPADM

## 2023-06-21 RX ORDER — ATORVASTATIN CALCIUM 40 MG/1
40 TABLET, FILM COATED ORAL NIGHTLY
Status: DISCONTINUED | OUTPATIENT
Start: 2023-06-21 | End: 2023-06-22 | Stop reason: HOSPADM

## 2023-06-21 RX ORDER — DILTIAZEM HYDROCHLORIDE 120 MG/1
120 CAPSULE, COATED, EXTENDED RELEASE ORAL DAILY
Status: DISCONTINUED | OUTPATIENT
Start: 2023-06-21 | End: 2023-06-22 | Stop reason: HOSPADM

## 2023-06-21 RX ORDER — LEVOTHYROXINE SODIUM 88 UG/1
88 TABLET ORAL
Status: DISCONTINUED | OUTPATIENT
Start: 2023-06-22 | End: 2023-06-22 | Stop reason: HOSPADM

## 2023-06-21 RX ORDER — ACETAMINOPHEN 325 MG/1
650 TABLET ORAL EVERY 6 HOURS PRN
Status: DISCONTINUED | OUTPATIENT
Start: 2023-06-21 | End: 2023-06-22 | Stop reason: HOSPADM

## 2023-06-21 RX ORDER — LABETALOL HYDROCHLORIDE 5 MG/ML
10 INJECTION, SOLUTION INTRAVENOUS EVERY 10 MIN PRN
Status: DISCONTINUED | OUTPATIENT
Start: 2023-06-21 | End: 2023-06-22 | Stop reason: HOSPADM

## 2023-06-21 RX ORDER — ASPIRIN 325 MG
325 TABLET ORAL DAILY
Status: DISCONTINUED | OUTPATIENT
Start: 2023-06-21 | End: 2023-06-21

## 2023-06-21 RX ORDER — ONDANSETRON 2 MG/ML
4 INJECTION INTRAMUSCULAR; INTRAVENOUS EVERY 6 HOURS PRN
Status: DISCONTINUED | OUTPATIENT
Start: 2023-06-21 | End: 2023-06-22 | Stop reason: HOSPADM

## 2023-06-21 RX ADMIN — IOPAMIDOL 100 ML: 755 INJECTION, SOLUTION INTRAVENOUS at 13:47

## 2023-06-21 RX ADMIN — ACETAMINOPHEN 650 MG: 325 TABLET ORAL at 17:12

## 2023-06-21 RX ADMIN — ASPIRIN 325 MG: 325 TABLET ORAL at 15:01

## 2023-06-21 RX ADMIN — APIXABAN 5 MG: 5 TABLET, FILM COATED ORAL at 20:47

## 2023-06-21 RX ADMIN — ATORVASTATIN CALCIUM 40 MG: 40 TABLET, FILM COATED ORAL at 20:47

## 2023-06-21 ASSESSMENT — PAIN SCALES - GENERAL
PAINLEVEL_OUTOF10: 0
PAINLEVEL_OUTOF10: 5
PAINLEVEL_OUTOF10: 5

## 2023-06-21 ASSESSMENT — PAIN DESCRIPTION - DESCRIPTORS: DESCRIPTORS: ACHING

## 2023-06-21 ASSESSMENT — LIFESTYLE VARIABLES
HOW OFTEN DO YOU HAVE A DRINK CONTAINING ALCOHOL: NEVER
HOW MANY STANDARD DRINKS CONTAINING ALCOHOL DO YOU HAVE ON A TYPICAL DAY: PATIENT DOES NOT DRINK

## 2023-06-21 ASSESSMENT — PAIN DESCRIPTION - LOCATION: LOCATION: HEAD

## 2023-06-21 NOTE — ED NOTES
Patient presented to the ED for left facial numbness then left arm numbness that started Sunday. That has resolved. Slight left facial droop noted. Passed swallow evaluation. Given 325 mg Aspirin. Patient to ECHO and then to floor. Inpatient report given as documented.      Tracee Carey RN  06/21/23 [id number]

## 2023-06-21 NOTE — H&P
6.2 3.6 - 11.0 K/uL    RBC 4.64 3.80 - 5.20 M/uL    Hemoglobin 12.5 11.5 - 16.0 g/dL    Hematocrit 39.2 35.0 - 47.0 %    MCV 84.5 80.0 - 99.0 FL    MCH 26.9 26.0 - 34.0 PG    MCHC 31.9 30.0 - 36.5 g/dL    RDW 14.6 (H) 11.5 - 14.5 %    Platelets 515 694 - 371 K/uL    MPV 11.2 8.9 - 12.9 FL    Nucleated RBCs 0.0 0  WBC    nRBC 0.00 0.00 - 0.01 K/uL    Neutrophils % 51 32 - 75 %    Lymphocytes % 35 12 - 49 %    Monocytes % 12 5 - 13 %    Eosinophils % 2 0 - 7 %    Basophils % 0 0 - 1 %    Immature Granulocytes 0 0.0 - 0.5 %    Neutrophils Absolute 3.2 1.8 - 8.0 K/UL    Lymphocytes Absolute 2.2 0.8 - 3.5 K/UL    Monocytes Absolute 0.7 0.0 - 1.0 K/UL    Eosinophils Absolute 0.1 0.0 - 0.4 K/UL    Basophils Absolute 0.0 0.0 - 0.1 K/UL    Absolute Immature Granulocyte 0.0 0.00 - 0.04 K/UL    Differential Type AUTOMATED     Comprehensive Metabolic Panel    Collection Time: 06/21/23 12:01 PM   Result Value Ref Range    Sodium 132 (L) 136 - 145 mmol/L    Potassium 4.0 3.5 - 5.1 mmol/L    Chloride 100 97 - 108 mmol/L    CO2 25 21 - 32 mmol/L    Anion Gap 7 5 - 15 mmol/L    Glucose 102 (H) 65 - 100 mg/dL    BUN 22 (H) 6 - 20 MG/DL    Creatinine 1.06 (H) 0.55 - 1.02 MG/DL    Bun/Cre Ratio 21 (H) 12 - 20      Est, Glom Filt Rate 53 (L) >60 ml/min/1.73m2    Calcium 9.0 8.5 - 10.1 MG/DL    Total Bilirubin 0.4 0.2 - 1.0 MG/DL    ALT 21 12 - 78 U/L    AST 22 15 - 37 U/L    Alk Phosphatase 103 45 - 117 U/L    Total Protein 7.8 6.4 - 8.2 g/dL    Albumin 3.9 3.5 - 5.0 g/dL    Globulin 3.9 2.0 - 4.0 g/dL    Albumin/Globulin Ratio 1.0 (L) 1.1 - 2.2     Troponin    Collection Time: 06/21/23 12:01 PM   Result Value Ref Range    Troponin, High Sensitivity 12 0 - 51 ng/L   Magnesium    Collection Time: 06/21/23 12:01 PM   Result Value Ref Range    Magnesium 2.0 1.6 - 2.4 mg/dL   TSH    Collection Time: 06/21/23 12:01 PM   Result Value Ref Range    TSH, 3RD GENERATION 3.68 0.36 - 3.74 uIU/mL   Protime-INR    Collection Time: 06/21/23

## 2023-06-21 NOTE — ED PROVIDER NOTES
nursing note reviewed. Constitutional:       General: She is not in acute distress. Appearance: Normal appearance. She is not ill-appearing. HENT:      Head: Normocephalic and atraumatic. Mouth/Throat:      Mouth: Mucous membranes are moist.   Eyes:      General: No scleral icterus. Extraocular Movements: Extraocular movements intact. Pupils: Pupils are equal, round, and reactive to light. Cardiovascular:      Rate and Rhythm: Normal rate and regular rhythm. Pulmonary:      Effort: Pulmonary effort is normal. No respiratory distress. Breath sounds: Normal breath sounds. No stridor. No wheezing, rhonchi or rales. Chest:      Chest wall: No tenderness. Abdominal:      General: There is no distension. Palpations: Abdomen is soft. Tenderness: There is no abdominal tenderness. Musculoskeletal:      Cervical back: Normal range of motion and neck supple. Right lower leg: Edema present. Left lower leg: Edema present. Skin:     General: Skin is warm and dry. Capillary Refill: Capillary refill takes less than 2 seconds. Neurological:      Mental Status: She is alert and oriented to person, place, and time. Cranial Nerves: No cranial nerve deficit.       Comments: Left facial droop, mild decrease in strength in left upper and lower extremity, no sensory deficit    Psychiatric:         Mood and Affect: Mood normal.         Behavior: Behavior normal.        DIAGNOSTIC RESULTS   LABS:     Recent Results (from the past 24 hour(s))   CBC with Auto Differential    Collection Time: 06/21/23 12:01 PM   Result Value Ref Range    WBC 6.2 3.6 - 11.0 K/uL    RBC 4.64 3.80 - 5.20 M/uL    Hemoglobin 12.5 11.5 - 16.0 g/dL    Hematocrit 39.2 35.0 - 47.0 %    MCV 84.5 80.0 - 99.0 FL    MCH 26.9 26.0 - 34.0 PG    MCHC 31.9 30.0 - 36.5 g/dL    RDW 14.6 (H) 11.5 - 14.5 %    Platelets 914 256 - 143 K/uL    MPV 11.2 8.9 - 12.9 FL    Nucleated RBCs 0.0 0  WBC    nRBC 0.00

## 2023-06-21 NOTE — CONSULTS
independently. IMPRESSION/RECOMMENDATIONS:  Power Brown is a [de-identified] y.o. female who presents with acute onset of left facial and left upper extremity numbness which quickly resolved over the course of 10 minutes. Patient does have multiple vascular risk factors including atrial fibrillation, hypertension, hyperlipidemia. Initial head CT, CTA head and neck unremarkable. Differential diagnosis includes TIA. MRI negative for stroke. #left face numbness/UE numbness - TIA   - Recommend maintaining SBP <220/120 for 24 hrs from symptom onset and then BP goal is less than 140/90 (this is the long term goal)   - would recommend to continue aspirin 81mg daily for secondary stroke prevention, can discontinue this when resuming Eliquis as patient does not have significant atherosclerosis on CTA. -should d/w cardiologist regarding switching AC to another DOAC or possibly warfarin.    - Risk factor modification: would recommend obtaining lipid panel and A1c    - if LDL > 70, would start atorvastatin 40mg daily    - please check hepatic panel prior to initiation of statin  - TTE - no intracardiac thrombus, EF 65-70% no shunt.  - would monitor on telemetry to rule out arrhythmias    - please obtain PT/OT and speech consultations       We discussed extensively the importance of lifestyle modification including smoking cessation, diet, and incorporating exercise into daily routine. Thank you very much for this consultation. Neurology will sign off. Patient should follow up with cardiology in 2 weeks and neurology in 6-8 weeks. I spent 60 minutes providing care to this acutely ill inpatient with > 50% of the time counseling as well as reviewing the patient's chart, notes, labs, medications and preparing documentation along with assisting in the coordination of care of the patient on the patient's hospital floor/unit.        Yamel Vega DO

## 2023-06-22 ENCOUNTER — TELEPHONE (OUTPATIENT)
Age: 78
End: 2023-06-22

## 2023-06-22 VITALS
TEMPERATURE: 97.7 F | RESPIRATION RATE: 16 BRPM | WEIGHT: 154 LBS | BODY MASS INDEX: 28.34 KG/M2 | OXYGEN SATURATION: 95 % | HEIGHT: 62 IN | HEART RATE: 72 BPM | SYSTOLIC BLOOD PRESSURE: 151 MMHG | DIASTOLIC BLOOD PRESSURE: 71 MMHG

## 2023-06-22 LAB
CHOLEST SERPL-MCNC: 193 MG/DL
ERYTHROCYTE [DISTWIDTH] IN BLOOD BY AUTOMATED COUNT: 14.7 % (ref 11.5–14.5)
EST. AVERAGE GLUCOSE BLD GHB EST-MCNC: 117 MG/DL
HBA1C MFR BLD: 5.7 % (ref 4–5.6)
HCT VFR BLD AUTO: 37.2 % (ref 35–47)
HDLC SERPL-MCNC: 57 MG/DL
HDLC SERPL: 3.4 (ref 0–5)
HGB BLD-MCNC: 11.9 G/DL (ref 11.5–16)
LDLC SERPL CALC-MCNC: 122.6 MG/DL (ref 0–100)
MCH RBC QN AUTO: 27.6 PG (ref 26–34)
MCHC RBC AUTO-ENTMCNC: 32 G/DL (ref 30–36.5)
MCV RBC AUTO: 86.3 FL (ref 80–99)
NRBC # BLD: 0 K/UL (ref 0–0.01)
NRBC BLD-RTO: 0 PER 100 WBC
PLATELET # BLD AUTO: 169 K/UL (ref 150–400)
PMV BLD AUTO: 10.9 FL (ref 8.9–12.9)
RBC # BLD AUTO: 4.31 M/UL (ref 3.8–5.2)
TRIGL SERPL-MCNC: 67 MG/DL
VLDLC SERPL CALC-MCNC: 13.4 MG/DL
WBC # BLD AUTO: 5.3 K/UL (ref 3.6–11)

## 2023-06-22 PROCEDURE — 6370000000 HC RX 637 (ALT 250 FOR IP): Performed by: STUDENT IN AN ORGANIZED HEALTH CARE EDUCATION/TRAINING PROGRAM

## 2023-06-22 PROCEDURE — 97165 OT EVAL LOW COMPLEX 30 MIN: CPT | Performed by: OCCUPATIONAL THERAPIST

## 2023-06-22 PROCEDURE — 97530 THERAPEUTIC ACTIVITIES: CPT | Performed by: OCCUPATIONAL THERAPIST

## 2023-06-22 PROCEDURE — 85027 COMPLETE CBC AUTOMATED: CPT

## 2023-06-22 PROCEDURE — 6370000000 HC RX 637 (ALT 250 FOR IP): Performed by: INTERNAL MEDICINE

## 2023-06-22 PROCEDURE — 80061 LIPID PANEL: CPT

## 2023-06-22 PROCEDURE — 97161 PT EVAL LOW COMPLEX 20 MIN: CPT | Performed by: PHYSICAL THERAPIST

## 2023-06-22 PROCEDURE — 36415 COLL VENOUS BLD VENIPUNCTURE: CPT

## 2023-06-22 PROCEDURE — 83036 HEMOGLOBIN GLYCOSYLATED A1C: CPT

## 2023-06-22 PROCEDURE — 97116 GAIT TRAINING THERAPY: CPT | Performed by: PHYSICAL THERAPIST

## 2023-06-22 RX ADMIN — POTASSIUM CHLORIDE 20 MEQ: 20 TABLET, EXTENDED RELEASE ORAL at 09:40

## 2023-06-22 RX ADMIN — FUROSEMIDE 40 MG: 40 TABLET ORAL at 09:40

## 2023-06-22 RX ADMIN — DILTIAZEM HYDROCHLORIDE 120 MG: 120 CAPSULE, EXTENDED RELEASE ORAL at 09:40

## 2023-06-22 RX ADMIN — LEVOTHYROXINE SODIUM 88 MCG: 0.09 TABLET ORAL at 05:52

## 2023-06-22 RX ADMIN — PANTOPRAZOLE SODIUM 40 MG: 40 TABLET, DELAYED RELEASE ORAL at 05:52

## 2023-06-22 RX ADMIN — APIXABAN 5 MG: 5 TABLET, FILM COATED ORAL at 09:41

## 2023-06-22 RX ADMIN — METOPROLOL SUCCINATE 12.5 MG: 25 TABLET, EXTENDED RELEASE ORAL at 09:40

## 2023-06-22 RX ADMIN — ASPIRIN 81 MG: 81 TABLET, CHEWABLE ORAL at 09:40

## 2023-06-22 RX ADMIN — AMLODIPINE BESYLATE 5 MG: 5 TABLET ORAL at 09:40

## 2023-06-22 NOTE — DISCHARGE SUMMARY
Hospitalist Discharge Summary     Patient ID:  Luci Rodriguez  [id number]  53 y.o.  1945 6/21/2023    PCP on record: Cuba Randolph MD    Admit date: 6/21/2023  Discharge date and time: 6/22/2023    DISCHARGE DIAGNOSIS:  History of A-fib/flutter/SVT-on Eliquis  AI status post AVR in 1693  Diastolic CHF  CHAU on CPAP  Hypothyroidism      CONSULTATIONS:  IP CONSULT TO HOSPITALIST  IP CONSULT TO NEUROLOGY    Excerpted HPI from H&P of Kvng Adames MD:    Luci Rodriguez is a [de-identified] y.o.  female with PMHx significant for A-fib/flutter on Eliquis with history of AVR in 1140, diastolic CHF, CHAU on CPAP came in with chief complaint of sudden onset left facial droop with left upper extremity numbness which quickly resolved over 10 minutes on Sunday, after which patient had seen her PCP in the office who referred her to the ER for further work-up. Patient admits to having headache that has been worsening since Monday with pain radiating to the left side of her head up from her neck. Patient was found to have negative CT head and CTA head and neck in the ED with unremarkable blood work. Patient currently has mild persistent left facial droop with no other focal neurological deficit when seen by me in the ED  We were asked to admit for work up and evaluation of the above problems. ______________________________________________________________________  DISCHARGE SUMMARY/HOSPITAL COURSE:  for full details see H&P, daily progress notes, labs, consult notes.      Patient admitted for stroke work-up after left face numbness yesterday likely it was TIA, rest of the work-up CTA as well as MRI was unremarkable, will stop aspirin on discharge, patient instructed to follow with her PCP for possible change of Eliquis to other sort of anticoagulation like Xarelto patient stated she would like to be on warfarin, no new symptoms patient instructed to follow-up with her PCP as well as

## 2023-06-22 NOTE — TELEPHONE ENCOUNTER
Verified patient with 2 identifiers   Advised her driving will be restricted for 6 months. Before getting driving privileges back she will probably be required to pass a  simulation course at [address] [place] [address]. Advised she could discuss further with Jerald at he appt on 8/28/23. Patient verified understanding.

## 2023-06-22 NOTE — PROGRESS NOTES
End of Shift Note    Bedside shift change report given to Jeffery Barriga RN (oncoming nurse) by Preet Garcia RN (offgoing nurse). Report included the following information Nurse Handoff Report, Index, ED Encounter Summary, ED SBAR, Adult Overview, Cardiac Rhythm NSR, Alarm Parameters, and Neuro Assessment      Shift worked:  7A-7P   Shift summary and any significant changes:    New admission for a stroke, mild left facial droop, alert oriented x4, SBA to the bathroom. NIH 1. Had MRI done during the shift and uses CPAP at home.        Concerns for physician to address:  None   Zone phone for oncoming shift:        Patient Information  Ryley Anton  [de-identified] y.o.  6/21/2023 11:14 AM by Ayesha Farley MD. 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     Past Medical History:   Diagnosis Date    Aortic stenosis     secondary to rheumatic fever as a child, s/p AVR with porcine valve 10/09    Arrhythmia 04/2021    afib/flutter    Arrhythmia 05/2020    SVT    Arthritis     generalized    Chronic pain     d/t arthritis    Degenerative disc disease, cervical     Diverticulosis     GERD (gastroesophageal reflux disease)     Heart failure ([address]     12/27/21 CHF    Ill-defined condition     diverticulosis    Other and unspecified hyperlipidemia     Rheumatic fever     Sleep apnea     BiPAP    Stroke ([address]     5 days post post AOVR; no recurrence    Unspecified essential hypertension     Unspecified hypothyroidism        Core Measures:  CVA: Yes   CHF:No   PNA:No    Activity:  Activity: To Bathroom  Number times ambulated in hallways past shift: 3  Number of times OOB to chair past shift: 3    Cardiac:   Cardiac Monitoring: Yes      Cardiac Rhythm: Sinus rhythm    Access:
MRI PENDING    Completion of MRI Screening Sheet    Fax to [phone] when completed    Please call [phone]  When this is done    Thank You
Orders received and chart reviewed. There is no completed H&P to review and pt is being admitted for a CVA work up. Will defer but continue to follow.
PHYSICAL THERAPY EVALUATION/DISCHARGE    Patient: Kimi Tapia (78 y.o. female)  Date: 2023  Primary Diagnosis: Acute CVA (cerebrovascular accident) [facility]) [I63.9]  Cerebrovascular accident (CVA), unspecified mechanism ([facility] [I63.9]       Precautions:                      ASSESSMENT AND RECOMMENDATIONS:  Based on the objective data below, the patient is appears to be at her baseline and has no deficits. Acute PT is not indicated and patient is safe to DC home with no services. Further skilled acute physical therapy is not indicated at this time. PLAN :  Recommendation for discharge: (in order for the patient to meet his/her long term goals): No skilled physical therapy    Other factors to consider for discharge: lives alone    IF patient discharges home will need the following DME: none       SUBJECTIVE:   Patient stated I'm doing better.     OBJECTIVE DATA SUMMARY:     Past Medical History:   Diagnosis Date    Aortic stenosis     secondary to rheumatic fever as a child, s/p AVR with porcine valve 10/09    Arrhythmia 2021    afib/flutter    Arrhythmia 2020    SVT    Arthritis     generalized    Chronic pain     d/t arthritis    Degenerative disc disease, cervical     Diverticulosis     GERD (gastroesophageal reflux disease)     Heart failure ([address]     21 CHF    Ill-defined condition     diverticulosis    Other and unspecified hyperlipidemia     Rheumatic fever     Sleep apnea     BiPAP    Stroke ([address]     5 days post post AOVR; no recurrence    Unspecified essential hypertension     Unspecified hypothyroidism      Past Surgical History:   Procedure Laterality Date    AORTIC VALVE REPLACEMENT      10/09    APPENDECTOMY      CATARACT REMOVAL      bilateral and lens implant    DELIVERY       x3    FOOT/TOES SURGERY PROC UNLISTED      bilateral    GYN      3 c section    HYSTERECTOMY (CERVIX STATUS UNKNOWN)      ORTHOPEDIC SURGERY      tendon realigment, bunionectomy
Remote telemetry removed. IV removed. Discharge instruction reviewed with patient. Patient verbalizes understanding of education including medication administration, follow-up appointments, and when to seek emergency medical attention. F/u cardiology appointment in place. Patient agrees to scheduling her own f/u PCP appointment in the next two wks, and f/u neurology appointment in the next 6-8 wks. Contact information for physician offices received by patient. Escorted to main entrance in wheelchair and departed with friend.     Trina Hardy
Speech Pathology Note    SLP orders received, however note H&P still pending. SLP will follow for formal evaluation when additional information in the chart. Thank you.     Herbert Holly M.S., CCC-SLP
Speech pathology note  Reviewed chart and note patient admitted with facial droop with concern for CVA. Note CT showed no acute abnormality and MRI was unremarkable. Note patient passed the nursing dysphagia screen and a regular diet was ordered. Discussed case with RN who reported no SLP-related concerns. Introduced self and role of SLP to patient. Patient denied any decline in motor speech, language, cognitive, or swallowing function. Formal SLP evaluation not clinically indicated at this time. Will sign off. Please re-consult if further needs arise. Thank you.     Beatriz Tucker, SLP
Catheter? No   Respiratory:   O2 Device: None (Room air)  Chronic home O2 use?: no  Incentive spirometer at bedside: no       GI:     Current diet:  ADULT DIET; Regular; Low Fat/Low Chol/High Fiber/2 gm Na  DIET ONE TIME MESSAGE;  Passing flatus: yes  Tolerating current diet: yes       Pain Management:   Patient states pain is manageable on current regimen: yes    Skin:  Luis Miguel Scale Score: 21  Interventions: float heels    Patient Safety:  Fall Score:  Cunningham Total Score: 35  Interventions: bed/chair alarm, gripper socks, and pt to call before getting OOB     @Rollbelt  @dexterity to release roll belt  Yes/No ( must document dexterity  here by stating Yes or No here, otherwise this is a restraint and must follow restraint documentation policy.)    DVT prophylaxis:  DVT prophylaxis Med- yes  DVT prophylaxis SCD or TONNY- yes     Wounds: (If Applicable)  Wounds- no    Active Consults:  IP CONSULT TO HOSPITALIST  IP CONSULT TO NEUROLOGY    Length of Stay:  Expected LOS: 2  Actual LOS: 1  Discharge Plan: yes       Nayely Fajardo RN
UE Bathing: Independent       LE Bathing: Independent       UE Dressing: Independent       LE Dressing: Independent       Toileting: Independent                ADL Intervention and task modifications:    Barthel Index:    Barthel Index   Feeding: Independent, Able to apply any necessary device. Feeds in reasonable time  Bathing: Performs without assistance  Grooming: Washes face, ackerman hair, brushes teeth, shaves (manages plug if electric razor)  Dressing: Independent, Ties shoes, fasteners, applies braces  Bowel Control: No accidents. Able to use enema or suppository if needed  Bladder Control: No accidents. Able to care for collecting device, if used  Toilet Transfers: Independent with toilet or bedpan. Handles clothes, wipes, flushes or cleans dotson  Chair/Bed Trannsfers: Independent, including locks of wheelchair and lifting footrests  Ambulation: Independent for 50 yards. May use assistive devices, except for rolling walker  Stairs: Independent. May use assistive devices  Total Barthel Index Score: 100            The Barthel ADL Index: Guidelines  1. The index should be used as a record of what a patient does, not as a record of what a patient could do. 2. The main aim is to establish degree of independence from any help, physical or verbal, however minor and for whatever reason. 3. The need for supervision renders the patient not independent. 4. A patient's performance should be established using the best available evidence. Asking the patient, friends/relatives and nurses are the usual sources, but direct observation and common sense are also important. However direct testing is not needed. 5. Usually the patient's performance over the preceding 24-48 hours is important, but occasionally longer periods will be relevant. 6. Middle categories imply that the patient supplies over 50 per cent of the effort. 7. Use of aids to be independent is allowed. Rosaura Macdonald., Barthel, D.W. ([id number]).  Functional

## 2023-06-22 NOTE — PLAN OF CARE
Problem: Discharge Planning  Goal: Discharge to home or other facility with appropriate resources  6/22/2023 1023 by Nixon Kauffman RN  Outcome: Progressing  Flowsheets (Taken 6/22/2023 0745)  Discharge to home or other facility with appropriate resources: Identify barriers to discharge with patient and caregiver  6/22/2023 0307 by Janett Salas RN  Outcome: Progressing  6/22/2023 0306 by Janett Salas RN  Outcome: Progressing     Problem: Safety - Adult  Goal: Free from fall injury  6/22/2023 0307 by Janett Salas RN  Outcome: Progressing  6/22/2023 0306 by Janett Saals RN  Outcome: Progressing     Problem: ABCDS Injury Assessment  Goal: Absence of physical injury  6/22/2023 0307 by Janett Salas RN  Outcome: Progressing  6/22/2023 0306 by Janett Salas RN  Outcome: Progressing     Problem: Neurosensory - Adult  Goal: Achieves stable or improved neurological status  6/22/2023 1023 by Nixon Kauffman RN  Outcome: Progressing  Flowsheets  Taken 6/22/2023 1023  Achieves stable or improved neurological status:   Assess for and report changes in neurological status   Maintain blood pressure and fluid volume within ordered parameters to optimize cerebral perfusion and minimize risk of hemorrhage   Monitor temperature, glucose, and sodium. Initiate appropriate interventions as ordered  Taken 6/22/2023 0745  Achieves stable or improved neurological status: Assess for and report changes in neurological status  6/22/2023 0307 by Janett Salas RN  Outcome: Progressing  6/22/2023 0306 by Janett Salas RN  Outcome: Progressing  Goal: Absence of seizures  6/22/2023 1023 by Nixon Kauffman RN  Outcome: Progressing  Flowsheets (Taken 6/22/2023 0745)  Absence of seizures: Monitor for seizure activity.   If seizure occurs, document type and location of movements and any associated apnea  6/22/2023 0307 by Janett Salas RN  Outcome: Progressing  6/22/2023 0306 by Janett Salas RN  Outcome: Progressing  Goal: Remains free

## 2023-06-22 NOTE — TELEPHONE ENCOUNTER
Pt called to schedule hosp fu appt. Wants to clarify if and when she is able to drive since she was dx'ed with a TIA.  Please call [phone]

## 2023-06-23 LAB
EKG ATRIAL RATE: 67 BPM
EKG DIAGNOSIS: NORMAL
EKG P AXIS: 91 DEGREES
EKG P-R INTERVAL: 156 MS
EKG Q-T INTERVAL: 406 MS
EKG QRS DURATION: 94 MS
EKG QTC CALCULATION (BAZETT): 429 MS
EKG R AXIS: 43 DEGREES
EKG T AXIS: 41 DEGREES
EKG VENTRICULAR RATE: 67 BPM

## 2023-06-30 ENCOUNTER — TELEPHONE (OUTPATIENT)
Age: 78
End: 2023-06-30

## 2023-07-03 NOTE — TELEPHONE ENCOUNTER
Verified patient with 2 identifiers. Advised per Dr Julián Gr   From my standpoint, there is no reason to not drive, but if the [facility] says 3 months, that is what she has to go with after TIA. Patient states the DMV did not tell her that. States the nurse who wheeled her out told her that she believed a TIA is now treated like a CVA as far as driving goes. Patient was very happy to hear she could drive.

## 2023-08-27 NOTE — PROGRESS NOTES
[address]  In Office FOLLOW-UP VISIT         Brionna Rand is a 78 y.o. female who presents today for the following:  Chief Complaint   Patient presents with    Follow-Up from Hospital     Patient was in the hospital and had a TIA. States that she has not had anymore episodes. ASSESSMENT AND PLAN  Patient is known to this practice. This is my first time seeing the patient. Chart and history reviewed in detail at today's office visit. 1. TIA (transient ischemic attack)  Assessment & Plan:   Stable without recurrence of symptoms     Patient is to continue on Xarelto and simvastatin as prescribed. Patient is to continue to work to all of his comorbid conditions as managed by PCP and other specialists as appropriate    RECOMMENDATIONS:  - BP goal is less than 140/90  - Goal HbA1c is less than 7.   - LDL less than 70     Stroke/TIA education was provided today in regards to risk factors for stroke and lifestyle modifications to help minimize the risk of future stroke. This included medication compliance, regular follow up with primary care physician,  and healthy lifestyle habits (nutrition/exercise). 2. Hospital discharge follow-up  Assessment & Plan:   Patient denies any recurrence of symptoms. She has been taking her medications appropriately and has been followed by her PCP and specialists which include cardiology as appropriate. She was switched from Eliquis to Xarelto and has been doing really well. Stroke risk factors teaching was reinforced to patient today. She verbalized understanding. All stroke work-up completed at the hospital, there is no additional work-up needed at this time from neurological standpoint. Patient is to continue to follow-up with her PCP and cardiologist as appropriate. 3. Insomnia, unspecified type  Assessment & Plan:    For insomnia, will start patient on a low-dose of amitriptyline 10 mg to see if that

## 2023-08-28 ENCOUNTER — OFFICE VISIT (OUTPATIENT)
Age: 78
End: 2023-08-28
Payer: MEDICARE

## 2023-08-28 VITALS
SYSTOLIC BLOOD PRESSURE: 158 MMHG | DIASTOLIC BLOOD PRESSURE: 70 MMHG | WEIGHT: 155.2 LBS | OXYGEN SATURATION: 97 % | RESPIRATION RATE: 16 BRPM | HEIGHT: 62 IN | BODY MASS INDEX: 28.56 KG/M2 | TEMPERATURE: 98.5 F | HEART RATE: 71 BPM

## 2023-08-28 DIAGNOSIS — G47.00 INSOMNIA, UNSPECIFIED TYPE: ICD-10-CM

## 2023-08-28 DIAGNOSIS — Z09 HOSPITAL DISCHARGE FOLLOW-UP: ICD-10-CM

## 2023-08-28 DIAGNOSIS — I10 HYPERTENSION, UNSPECIFIED TYPE: ICD-10-CM

## 2023-08-28 DIAGNOSIS — H53.9 VISUAL DISTURBANCE: ICD-10-CM

## 2023-08-28 DIAGNOSIS — G45.9 TIA (TRANSIENT ISCHEMIC ATTACK): Primary | ICD-10-CM

## 2023-08-28 PROCEDURE — 99215 OFFICE O/P EST HI 40 MIN: CPT

## 2023-08-28 PROCEDURE — 1123F ACP DISCUSS/DSCN MKR DOCD: CPT

## 2023-08-28 PROCEDURE — 3077F SYST BP >= 140 MM HG: CPT

## 2023-08-28 PROCEDURE — 3078F DIAST BP <80 MM HG: CPT

## 2023-08-28 RX ORDER — AMITRIPTYLINE HYDROCHLORIDE 10 MG/1
10 TABLET, FILM COATED ORAL NIGHTLY
Qty: 30 TABLET | Refills: 5 | Status: SHIPPED | OUTPATIENT
Start: 2023-08-28

## 2023-08-28 RX ORDER — HYDROXYZINE 50 MG/1
TABLET, FILM COATED ORAL
COMMUNITY
Start: 2023-08-11

## 2023-08-28 RX ORDER — RIVAROXABAN 20 MG/1
TABLET, FILM COATED ORAL
COMMUNITY
Start: 2023-06-27

## 2023-08-28 ASSESSMENT — ENCOUNTER SYMPTOMS
GASTROINTESTINAL NEGATIVE: 1
RESPIRATORY NEGATIVE: 1

## 2023-08-28 ASSESSMENT — PATIENT HEALTH QUESTIONNAIRE - PHQ9
SUM OF ALL RESPONSES TO PHQ QUESTIONS 1-9: 0
SUM OF ALL RESPONSES TO PHQ9 QUESTIONS 1 & 2: 0
SUM OF ALL RESPONSES TO PHQ QUESTIONS 1-9: 0
2. FEELING DOWN, DEPRESSED OR HOPELESS: 0
1. LITTLE INTEREST OR PLEASURE IN DOING THINGS: 0

## 2023-08-28 NOTE — PATIENT INSTRUCTIONS
As per our discussion,    Overall, you are doing really well. Patient is to continue on apixaban 5 mg twice a day and simvastatin 20 mg daily. Patient is to continue to work to all of his comorbid conditions as managed by PCP and other specialists as appropriate    RECOMMENDATIONS:  - BP goal is less than 140/90  - Goal HbA1c is less than 7.   - LDL less than 70      I provided stroke /TIA education today in regards to risk factors for stroke and lifestyle modifications to help minimize the risk of future stroke. This included medication compliance, regular follow up with primary care physician,  and healthy lifestyle habits (nutrition/exercise) . For your visual disturbances, we will continue to monitor you for this. Continue to take amlodipine as prescribed. It was a pleasure meeting you today. I will see you back in a year or sooner if needed. Please do not hesitate to reach out for any questions or concerns.

## 2023-08-29 NOTE — ASSESSMENT & PLAN NOTE
Stable    Patient has been experiencing visual disturbances since 2009 after aortic valve replacement and was told may be related due to arterial spasms. She was placed on amlodipine 5 mg which was helping and later seems to be ineffective. She denied any visual disturbances today. There is no additional testing from a neurological standpoint. Patient is to continue to follow-up with ophthalmology as appropriate.

## 2023-08-29 NOTE — ASSESSMENT & PLAN NOTE
Patient denies any recurrence of symptoms. She has been taking her medications appropriately and has been followed by her PCP and specialists which include cardiology as appropriate. She was switched from Eliquis to Xarelto and has been doing really well. Stroke risk factors teaching was reinforced to patient today. She verbalized understanding. All stroke work-up completed at the hospital, there is no additional work-up needed at this time from neurological standpoint. Patient is to continue to follow-up with her PCP and cardiologist as appropriate.

## 2023-08-29 NOTE — ASSESSMENT & PLAN NOTE
For insomnia, will start patient on a low-dose of amitriptyline 10 mg to see if that helps. Side effects were reviewed with patient. Sleep hygiene was discussed the patient. Patient is to continue to wear her CPAP at bedtime as appropriate.

## 2023-08-29 NOTE — ASSESSMENT & PLAN NOTE
It was noted patient was hypertensive in the office today. She has a blood pressure 170/82 and was later rechecked it was 158/70. Patient is asymptomatic. Patient was advised to check blood pressure at home to see if blood pressure normalizes. If it remains elevated, she will reach out to primary care provider for better blood pressure management. Patient is to continue to follow-up with PCP as appropriate.

## 2023-08-29 NOTE — ASSESSMENT & PLAN NOTE
Stable without recurrence of symptoms     Patient is to continue on Xarelto and simvastatin as prescribed. Patient is to continue to work to all of his comorbid conditions as managed by PCP and other specialists as appropriate    RECOMMENDATIONS:  - BP goal is less than 140/90  - Goal HbA1c is less than 7.   - LDL less than 70     Stroke/TIA education was provided today in regards to risk factors for stroke and lifestyle modifications to help minimize the risk of future stroke. This included medication compliance, regular follow up with primary care physician,  and healthy lifestyle habits (nutrition/exercise).

## 2023-09-27 PROBLEM — Z09 HOSPITAL DISCHARGE FOLLOW-UP: Status: RESOLVED | Noted: 2023-08-28 | Resolved: 2023-09-27

## 2023-12-07 ENCOUNTER — ANESTHESIA (OUTPATIENT)
Facility: HOSPITAL | Age: 78
End: 2023-12-07
Payer: MEDICARE

## 2023-12-07 ENCOUNTER — ANESTHESIA EVENT (OUTPATIENT)
Facility: HOSPITAL | Age: 78
End: 2023-12-07
Payer: MEDICARE

## 2023-12-07 ENCOUNTER — HOSPITAL ENCOUNTER (OUTPATIENT)
Facility: HOSPITAL | Age: 78
Discharge: HOME | End: 2023-12-08
Attending: INTERNAL MEDICINE | Admitting: INTERNAL MEDICINE
Payer: MEDICARE

## 2023-12-07 DIAGNOSIS — I48.91 A-FIB (HCC): ICD-10-CM

## 2023-12-07 DIAGNOSIS — I48.91 ATRIAL FIBRILLATION (HCC): ICD-10-CM

## 2023-12-07 PROBLEM — I48.92 LEFT ATRIAL FLUTTER BY ELECTROCARDIOGRAM (HCC): Status: ACTIVE | Noted: 2023-12-07

## 2023-12-07 LAB
ACT BLD: 206 SECS (ref 79–138)
ACT BLD: 320 SECS (ref 79–138)
ACT BLD: 331 SECS (ref 79–138)
ECHO BSA: 1.74 M2
ECHO BSA: 1.74 M2

## 2023-12-07 PROCEDURE — 7100000000 HC PACU RECOVERY - FIRST 15 MIN: Performed by: INTERNAL MEDICINE

## 2023-12-07 PROCEDURE — 2709999900 HC NON-CHARGEABLE SUPPLY: Performed by: INTERNAL MEDICINE

## 2023-12-07 PROCEDURE — 2580000003 HC RX 258: Performed by: INTERNAL MEDICINE

## 2023-12-07 PROCEDURE — C1894 INTRO/SHEATH, NON-LASER: HCPCS | Performed by: INTERNAL MEDICINE

## 2023-12-07 PROCEDURE — 93656 COMPRE EP EVAL ABLTJ ATR FIB: CPT | Performed by: INTERNAL MEDICINE

## 2023-12-07 PROCEDURE — 2720000010 HC SURG SUPPLY STERILE: Performed by: INTERNAL MEDICINE

## 2023-12-07 PROCEDURE — 6370000000 HC RX 637 (ALT 250 FOR IP): Performed by: INTERNAL MEDICINE

## 2023-12-07 PROCEDURE — 93657 TX L/R ATRIAL FIB ADDL: CPT | Performed by: INTERNAL MEDICINE

## 2023-12-07 PROCEDURE — 94660 CPAP INITIATION&MGMT: CPT

## 2023-12-07 PROCEDURE — 6360000002 HC RX W HCPCS: Performed by: NURSE ANESTHETIST, CERTIFIED REGISTERED

## 2023-12-07 PROCEDURE — C1759 CATH, INTRA ECHOCARDIOGRAPHY: HCPCS | Performed by: INTERNAL MEDICINE

## 2023-12-07 PROCEDURE — C1766 INTRO/SHEATH,STRBLE,NON-PEEL: HCPCS | Performed by: INTERNAL MEDICINE

## 2023-12-07 PROCEDURE — 7100000001 HC PACU RECOVERY - ADDTL 15 MIN: Performed by: INTERNAL MEDICINE

## 2023-12-07 PROCEDURE — 85347 COAGULATION TIME ACTIVATED: CPT

## 2023-12-07 PROCEDURE — 3700000000 HC ANESTHESIA ATTENDED CARE: Performed by: INTERNAL MEDICINE

## 2023-12-07 PROCEDURE — 93312 ECHO TRANSESOPHAGEAL: CPT

## 2023-12-07 PROCEDURE — 2500000003 HC RX 250 WO HCPCS: Performed by: NURSE ANESTHETIST, CERTIFIED REGISTERED

## 2023-12-07 PROCEDURE — 3700000001 HC ADD 15 MINUTES (ANESTHESIA): Performed by: INTERNAL MEDICINE

## 2023-12-07 PROCEDURE — C1760 CLOSURE DEV, VASC: HCPCS | Performed by: INTERNAL MEDICINE

## 2023-12-07 PROCEDURE — C1732 CATH, EP, DIAG/ABL, 3D/VECT: HCPCS | Performed by: INTERNAL MEDICINE

## 2023-12-07 PROCEDURE — 2580000003 HC RX 258: Performed by: NURSE ANESTHETIST, CERTIFIED REGISTERED

## 2023-12-07 PROCEDURE — C1731 CATH, EP, 20 OR MORE ELEC: HCPCS | Performed by: INTERNAL MEDICINE

## 2023-12-07 RX ORDER — ROCURONIUM BROMIDE 10 MG/ML
INJECTION, SOLUTION INTRAVENOUS PRN
Status: DISCONTINUED | OUTPATIENT
Start: 2023-12-07 | End: 2023-12-07 | Stop reason: SDUPTHER

## 2023-12-07 RX ORDER — PHENYLEPHRINE HCL IN 0.9% NACL 0.4MG/10ML
SYRINGE (ML) INTRAVENOUS PRN
Status: DISCONTINUED | OUTPATIENT
Start: 2023-12-07 | End: 2023-12-07 | Stop reason: SDUPTHER

## 2023-12-07 RX ORDER — FENTANYL CITRATE 50 UG/ML
INJECTION, SOLUTION INTRAMUSCULAR; INTRAVENOUS PRN
Status: DISCONTINUED | OUTPATIENT
Start: 2023-12-07 | End: 2023-12-07 | Stop reason: SDUPTHER

## 2023-12-07 RX ORDER — METOPROLOL SUCCINATE 25 MG/1
12.5 TABLET, EXTENDED RELEASE ORAL DAILY
Status: DISCONTINUED | OUTPATIENT
Start: 2023-12-07 | End: 2023-12-08 | Stop reason: HOSPADM

## 2023-12-07 RX ORDER — FUROSEMIDE 40 MG/1
40 TABLET ORAL DAILY
Status: DISCONTINUED | OUTPATIENT
Start: 2023-12-07 | End: 2023-12-08 | Stop reason: HOSPADM

## 2023-12-07 RX ORDER — SODIUM CHLORIDE, SODIUM LACTATE, POTASSIUM CHLORIDE, CALCIUM CHLORIDE 600; 310; 30; 20 MG/100ML; MG/100ML; MG/100ML; MG/100ML
INJECTION, SOLUTION INTRAVENOUS CONTINUOUS PRN
Status: DISCONTINUED | OUTPATIENT
Start: 2023-12-07 | End: 2023-12-07 | Stop reason: SDUPTHER

## 2023-12-07 RX ORDER — CETIRIZINE HYDROCHLORIDE 10 MG/1
10 TABLET ORAL DAILY PRN
Status: DISCONTINUED | OUTPATIENT
Start: 2023-12-07 | End: 2023-12-08 | Stop reason: HOSPADM

## 2023-12-07 RX ORDER — VITAMIN B COMPLEX
2000 TABLET ORAL DAILY
Status: DISCONTINUED | OUTPATIENT
Start: 2023-12-07 | End: 2023-12-08 | Stop reason: HOSPADM

## 2023-12-07 RX ORDER — AMLODIPINE BESYLATE 5 MG/1
5 TABLET ORAL DAILY
Status: DISCONTINUED | OUTPATIENT
Start: 2023-12-07 | End: 2023-12-08 | Stop reason: HOSPADM

## 2023-12-07 RX ORDER — FLUTICASONE PROPIONATE 50 MCG
2 SPRAY, SUSPENSION (ML) NASAL DAILY PRN
Status: DISCONTINUED | OUTPATIENT
Start: 2023-12-07 | End: 2023-12-08 | Stop reason: HOSPADM

## 2023-12-07 RX ORDER — LEVOTHYROXINE SODIUM 88 UG/1
88 TABLET ORAL
Status: DISCONTINUED | OUTPATIENT
Start: 2023-12-08 | End: 2023-12-08 | Stop reason: HOSPADM

## 2023-12-07 RX ORDER — ATORVASTATIN CALCIUM 10 MG/1
10 TABLET, FILM COATED ORAL DAILY
Status: DISCONTINUED | OUTPATIENT
Start: 2023-12-07 | End: 2023-12-08 | Stop reason: HOSPADM

## 2023-12-07 RX ORDER — DEXAMETHASONE SODIUM PHOSPHATE 4 MG/ML
INJECTION, SOLUTION INTRA-ARTICULAR; INTRALESIONAL; INTRAMUSCULAR; INTRAVENOUS; SOFT TISSUE PRN
Status: DISCONTINUED | OUTPATIENT
Start: 2023-12-07 | End: 2023-12-07 | Stop reason: SDUPTHER

## 2023-12-07 RX ORDER — PANTOPRAZOLE SODIUM 40 MG/1
40 TABLET, DELAYED RELEASE ORAL
Status: DISCONTINUED | OUTPATIENT
Start: 2023-12-08 | End: 2023-12-08 | Stop reason: HOSPADM

## 2023-12-07 RX ORDER — DICYCLOMINE HYDROCHLORIDE 10 MG/1
10 CAPSULE ORAL 4 TIMES DAILY PRN
Status: DISCONTINUED | OUTPATIENT
Start: 2023-12-07 | End: 2023-12-08 | Stop reason: HOSPADM

## 2023-12-07 RX ORDER — SODIUM CHLORIDE 0.9 % (FLUSH) 0.9 %
5-40 SYRINGE (ML) INJECTION EVERY 12 HOURS SCHEDULED
Status: DISCONTINUED | OUTPATIENT
Start: 2023-12-07 | End: 2023-12-08 | Stop reason: HOSPADM

## 2023-12-07 RX ORDER — HEPARIN SODIUM 1000 [USP'U]/ML
INJECTION, SOLUTION INTRAVENOUS; SUBCUTANEOUS PRN
Status: DISCONTINUED | OUTPATIENT
Start: 2023-12-07 | End: 2023-12-07 | Stop reason: SDUPTHER

## 2023-12-07 RX ORDER — DILTIAZEM HYDROCHLORIDE 120 MG/1
120 CAPSULE, COATED, EXTENDED RELEASE ORAL DAILY
Status: DISCONTINUED | OUTPATIENT
Start: 2023-12-08 | End: 2023-12-08 | Stop reason: HOSPADM

## 2023-12-07 RX ORDER — METHOCARBAMOL 500 MG/1
500 TABLET, FILM COATED ORAL 3 TIMES DAILY PRN
Status: DISCONTINUED | OUTPATIENT
Start: 2023-12-07 | End: 2023-12-08 | Stop reason: HOSPADM

## 2023-12-07 RX ORDER — SODIUM CHLORIDE 9 MG/ML
INJECTION, SOLUTION INTRAVENOUS PRN
Status: DISCONTINUED | OUTPATIENT
Start: 2023-12-07 | End: 2023-12-08 | Stop reason: HOSPADM

## 2023-12-07 RX ORDER — SUCCINYLCHOLINE CHLORIDE 20 MG/ML
INJECTION INTRAMUSCULAR; INTRAVENOUS PRN
Status: DISCONTINUED | OUTPATIENT
Start: 2023-12-07 | End: 2023-12-07 | Stop reason: SDUPTHER

## 2023-12-07 RX ORDER — ACETAMINOPHEN 325 MG/1
650 TABLET ORAL EVERY 4 HOURS PRN
Status: DISCONTINUED | OUTPATIENT
Start: 2023-12-07 | End: 2023-12-08 | Stop reason: HOSPADM

## 2023-12-07 RX ORDER — SODIUM CHLORIDE 0.9 % (FLUSH) 0.9 %
5-40 SYRINGE (ML) INJECTION PRN
Status: DISCONTINUED | OUTPATIENT
Start: 2023-12-07 | End: 2023-12-08 | Stop reason: HOSPADM

## 2023-12-07 RX ORDER — PROTAMINE SULFATE 10 MG/ML
INJECTION, SOLUTION INTRAVENOUS PRN
Status: DISCONTINUED | OUTPATIENT
Start: 2023-12-07 | End: 2023-12-07 | Stop reason: SDUPTHER

## 2023-12-07 RX ORDER — ONDANSETRON 2 MG/ML
INJECTION INTRAMUSCULAR; INTRAVENOUS PRN
Status: DISCONTINUED | OUTPATIENT
Start: 2023-12-07 | End: 2023-12-07 | Stop reason: SDUPTHER

## 2023-12-07 RX ORDER — LIDOCAINE HYDROCHLORIDE 20 MG/ML
INJECTION, SOLUTION EPIDURAL; INFILTRATION; INTRACAUDAL; PERINEURAL PRN
Status: DISCONTINUED | OUTPATIENT
Start: 2023-12-07 | End: 2023-12-07 | Stop reason: SDUPTHER

## 2023-12-07 RX ORDER — HEPARIN SODIUM 10000 [USP'U]/ML
INJECTION, SOLUTION INTRAVENOUS; SUBCUTANEOUS PRN
Status: DISCONTINUED | OUTPATIENT
Start: 2023-12-07 | End: 2023-12-07 | Stop reason: HOSPADM

## 2023-12-07 RX ADMIN — PSYLLIUM HUSK 1 PACKET: 3.4 POWDER ORAL at 21:09

## 2023-12-07 RX ADMIN — HEPARIN SODIUM 12000 UNITS: 1000 INJECTION, SOLUTION INTRAVENOUS; SUBCUTANEOUS at 13:19

## 2023-12-07 RX ADMIN — DEXAMETHASONE SODIUM PHOSPHATE 4 MG: 4 INJECTION, SOLUTION INTRAMUSCULAR; INTRAVENOUS at 12:45

## 2023-12-07 RX ADMIN — SODIUM CHLORIDE, PRESERVATIVE FREE 10 ML: 5 INJECTION INTRAVENOUS at 21:09

## 2023-12-07 RX ADMIN — SODIUM CHLORIDE, POTASSIUM CHLORIDE, SODIUM LACTATE AND CALCIUM CHLORIDE: 600; 310; 30; 20 INJECTION, SOLUTION INTRAVENOUS at 12:14

## 2023-12-07 RX ADMIN — LIDOCAINE HYDROCHLORIDE 100 MG: 20 INJECTION, SOLUTION EPIDURAL; INFILTRATION; INTRACAUDAL; PERINEURAL at 12:21

## 2023-12-07 RX ADMIN — ACETAMINOPHEN 650 MG: 325 TABLET ORAL at 16:45

## 2023-12-07 RX ADMIN — ONDANSETRON HYDROCHLORIDE 4 MG: 2 INJECTION, SOLUTION INTRAMUSCULAR; INTRAVENOUS at 14:57

## 2023-12-07 RX ADMIN — METOPROLOL SUCCINATE 12.5 MG: 25 TABLET, EXTENDED RELEASE ORAL at 18:35

## 2023-12-07 RX ADMIN — SUCCINYLCHOLINE CHLORIDE 100 MG: 20 INJECTION, SOLUTION INTRAMUSCULAR; INTRAVENOUS at 12:21

## 2023-12-07 RX ADMIN — FENTANYL CITRATE 25 MCG: 50 INJECTION, SOLUTION INTRAMUSCULAR; INTRAVENOUS at 12:21

## 2023-12-07 RX ADMIN — PROPOFOL 100 MG: 10 INJECTION, EMULSION INTRAVENOUS at 12:21

## 2023-12-07 RX ADMIN — Medication 100 MCG: at 12:21

## 2023-12-07 RX ADMIN — PROTAMINE SULFATE 70 MG: 10 INJECTION, SOLUTION INTRAVENOUS at 14:57

## 2023-12-07 RX ADMIN — RIVAROXABAN 10 MG: 10 TABLET, FILM COATED ORAL at 18:35

## 2023-12-07 RX ADMIN — FENTANYL CITRATE 75 MCG: 50 INJECTION, SOLUTION INTRAMUSCULAR; INTRAVENOUS at 12:44

## 2023-12-07 RX ADMIN — PHENYLEPHRINE HYDROCHLORIDE 30 MCG/MIN: 10 INJECTION INTRAVENOUS at 12:33

## 2023-12-07 RX ADMIN — ATORVASTATIN CALCIUM 10 MG: 10 TABLET, FILM COATED ORAL at 18:35

## 2023-12-07 RX ADMIN — ROCURONIUM BROMIDE 10 MG: 10 INJECTION INTRAVENOUS at 12:21

## 2023-12-07 ASSESSMENT — PAIN DESCRIPTION - DESCRIPTORS: DESCRIPTORS: DISCOMFORT

## 2023-12-07 NOTE — ANESTHESIA PRE PROCEDURE
Department of Anesthesiology  Preprocedure Note       Name:  Anjelica Samuel   Age:  78 y.o.  :  1945                                          MRN:  [id number]         Date:  2023      Surgeon: Roxane Ivy):  Joshua Kemp MD    Procedure: Procedure(s):  Ablation A-fib w complete ep study    Medications prior to admission:   Prior to Admission medications    Medication Sig Start Date End Date Taking?  Authorizing Provider   XARELTO 20 MG TABS tablet  23   Brooke Soria MD   hydrOXYzine HCl (ATARAX) 50 MG tablet  23   Brooke Soria MD   amitriptyline (ELAVIL) 10 MG tablet Take 1 tablet by mouth nightly  Patient not taking: Reported on 2023   LAURA Mendez - NP   potassium chloride (KLOR-CON) 10 MEQ extended release tablet Take 2 tablets by mouth daily    Brooke Soria MD   Omega-3 Fatty Acids (FISH OIL) 1000 MG capsule Take 1 capsule by mouth daily    Brooke Soria MD   turmeric 500 MG CAPS Take 2 capsules by mouth daily    Brooke Soria MD   Psyllium (METAMUCIL PO) Take by mouth in the morning and at bedtime One tablespoon BID    ProviderBrooke MD   UNABLE TO FIND Astapro nasal spray    ProviderBrooke MD   amLODIPine (NORVASC) 5 MG tablet Take 1 tablet by mouth daily    Automatic Reconciliation, Ar   celecoxib (CELEBREX) 200 MG capsule Take 1 capsule by mouth daily  Patient not taking: Reported on 2023    Automatic Reconciliation, Ar   cetirizine (ZYRTEC) 10 MG tablet Take 1 tablet by mouth daily as needed    Automatic Reconciliation, Ar   Cholecalciferol 50 MCG (2000) TABS Take 1 tablet by mouth daily    Automatic Reconciliation, Ar   dicyclomine (BENTYL) 10 MG capsule Take 1 capsule by mouth 4 times daily as needed Takes one to two daily as needed 09   Automatic Reconciliation, Ar   dilTIAZem (CARDIZEM LA) 120 MG TB24 extended release tablet Take 1 capsule by mouth    Automatic Reconciliation, Ar

## 2023-12-07 NOTE — ANESTHESIA POSTPROCEDURE EVALUATION
Department of Anesthesiology  Postprocedure Note    Patient: Kourtney Winn  MRN: [id number]  YOB: 1945  Date of evaluation: 12/7/2023      Procedure Summary       Date: 12/07/23 Room / Location: [facility] EP LAB / [facility] CARDIAC CATH LAB    Anesthesia Start: 1214 Anesthesia Stop: 7167    Procedures:       Ablation A-fib w complete ep study      Ablation following A-fib addl Diagnosis: A-fib ([address])    Providers: Charla Che MD Responsible Provider: Milan Gibbs MD    Anesthesia Type: General ASA Status: 3            Anesthesia Type: General    Bunny Phase I: Bunny Score: 9    Bunny Phase II:        Anesthesia Post Evaluation    Patient location during evaluation: PACU  Patient participation: complete - patient participated  Level of consciousness: sleepy but conscious and responsive to verbal stimuli  Airway patency: patent  Nausea & Vomiting: no vomiting and no nausea  Complications: no  Cardiovascular status: blood pressure returned to baseline and hemodynamically stable  Respiratory status: acceptable  Hydration status: stable

## 2023-12-07 NOTE — PROGRESS NOTES
Status post ELECTROPHYSIOLOGY study revealing LA flutter with passive RA activation. S/p PULMONARY VEIN ISOLATION with posterior and anterior wall electrical isolation. This resulted in conversion of LA flutter to a RA flutter which was terminated by CTI line. Recurrence at slower CL was noted, CTI line was refined and this terminated. In SINUS RHYTHM at the end of the case.

## 2023-12-08 VITALS
TEMPERATURE: 98.2 F | WEIGHT: 153 LBS | DIASTOLIC BLOOD PRESSURE: 64 MMHG | BODY MASS INDEX: 28.16 KG/M2 | OXYGEN SATURATION: 97 % | HEIGHT: 62 IN | RESPIRATION RATE: 19 BRPM | HEART RATE: 73 BPM | SYSTOLIC BLOOD PRESSURE: 131 MMHG

## 2023-12-08 PROCEDURE — 6370000000 HC RX 637 (ALT 250 FOR IP): Performed by: INTERNAL MEDICINE

## 2023-12-08 RX ADMIN — LEVOTHYROXINE SODIUM 88 MCG: 0.09 TABLET ORAL at 06:49

## 2023-12-08 RX ADMIN — RIVAROXABAN 20 MG: 20 TABLET, FILM COATED ORAL at 09:03

## 2023-12-08 RX ADMIN — ACETAMINOPHEN 650 MG: 325 TABLET ORAL at 00:03

## 2023-12-08 RX ADMIN — FUROSEMIDE 40 MG: 40 TABLET ORAL at 09:02

## 2023-12-08 RX ADMIN — AMLODIPINE BESYLATE 5 MG: 5 TABLET ORAL at 09:02

## 2023-12-08 RX ADMIN — PANTOPRAZOLE SODIUM 40 MG: 40 TABLET, DELAYED RELEASE ORAL at 06:49

## 2023-12-08 ASSESSMENT — PAIN DESCRIPTION - DESCRIPTORS: DESCRIPTORS: DULL

## 2023-12-08 ASSESSMENT — PAIN SCALES - GENERAL: PAINLEVEL_OUTOF10: 3

## 2023-12-08 NOTE — DISCHARGE INSTRUCTIONS
POST-ABLATION DISCHARGE INSTRUCTIONS:    You had an ablation using radiofrequency energy yesterday with Dr. Naima Clarke to terminate a problem called left atrial flutter. Additional treatment was done for atrial fibrillation as well as another problem called right atrial flutter. Please continue your usual medications including anticoagulation. You got a dose of Xarelto this morning. To get you back to your usual schedule, tomorrow, take your Xarelto around lunchtime, the following day, take it when you usually do in the evening. Thereafter, you'll be taking your blood thinner the way you usually have been. Call the office to make an appointment with the nurse practitioner [phone] in one month post-ablation (if an appointment isn't already present). Do not drive, operate any machinery, or sign any legal documents for 24 hours after your procedure. You must have someone to drive you home. You may take a shower 24 hours after your cardiac procedure. Be sure to get the dressing wet and then remove it; gently wash the area with warm soapy water. Pat dry and leave open to air. To help prevent infections, be sure to keep the cath site clean and dry. No lotions, creams, powders, ointments, etc. in the cath site for approximately 1 week. Do not take a tub bath, get in a hot tub or swimming pool for approximately 5 days or until the cath site is completely healed. No strenuous activity or heavy lifting over 20 lbs. for 7 days. After your procedure, some bruising or discomfort is common during the healing process. Tylenol, 1-2 tablets every 6 hours as needed, is recommended if you experience any discomfort.   If you experience any signs or symptoms of infection such as fever, chills, or poorly healing incision, persistent tenderness or swelling in the groin, redness and/or warmth to the touch, numbness, significant tingling or pain at the groin site or affected extremity, rash, drainage

## 2023-12-08 NOTE — PROGRESS NOTES
Doing well post-ablation. Remains in sinus rhythm. All questions answered. Instructions reviewed. She is aware of signs and symptoms that warrant urgent medical F/U or calling 911.     Abundio Brandon MD

## 2023-12-08 NOTE — PLAN OF CARE
Problem: Chronic Conditions and Co-morbidities  Goal: Patient's chronic conditions and co-morbidity symptoms are monitored and maintained or improved  Outcome: Progressing  Flowsheets (Taken 12/7/2023 1903)  Care Plan - Patient's Chronic Conditions and Co-Morbidity Symptoms are Monitored and Maintained or Improved: Monitor and assess patient's chronic conditions and comorbid symptoms for stability, deterioration, or improvement     Problem: Discharge Planning  Goal: Discharge to home or other facility with appropriate resources  Outcome: Progressing  Flowsheets (Taken 12/7/2023 1903)  Discharge to home or other facility with appropriate resources: Identify barriers to discharge with patient and caregiver     Problem: Pain  Goal: Verbalizes/displays adequate comfort level or baseline comfort level  Outcome: Progressing     Problem: Safety - Adult  Goal: Free from fall injury  Outcome: Progressing

## 2023-12-08 NOTE — PROGRESS NOTES
I have reviewed Discharge Instructions with the patient. The patient verbalized understanding. Discharge medications reviewed with patient along with appropriate educational materials. Opportunity for questions and clarification was provided. All lines removed without difficulty. Bilateral groin sites are clean, dry, and intact. Patient's belongings gathered and with patient. Patient is ready for discharge.

## 2023-12-11 LAB — ECHO BSA: 1.74 M2

## 2023-12-15 LAB — ECHO BSA: 1.74 M2

## 2023-12-20 ENCOUNTER — APPOINTMENT (OUTPATIENT)
Facility: HOSPITAL | Age: 78
DRG: 378 | End: 2023-12-20
Payer: MEDICARE

## 2023-12-20 ENCOUNTER — HOSPITAL ENCOUNTER (INPATIENT)
Facility: HOSPITAL | Age: 78
LOS: 3 days | Discharge: HOME | DRG: 378 | End: 2023-12-25
Attending: STUDENT IN AN ORGANIZED HEALTH CARE EDUCATION/TRAINING PROGRAM | Admitting: STUDENT IN AN ORGANIZED HEALTH CARE EDUCATION/TRAINING PROGRAM
Payer: MEDICARE

## 2023-12-20 DIAGNOSIS — K57.90 DIVERTICULOSIS: ICD-10-CM

## 2023-12-20 DIAGNOSIS — K92.2 LOWER GI BLEED: Primary | ICD-10-CM

## 2023-12-20 DIAGNOSIS — Z79.01 ANTICOAGULATED: ICD-10-CM

## 2023-12-20 DIAGNOSIS — R10.9 ABDOMINAL PAIN, UNSPECIFIED ABDOMINAL LOCATION: ICD-10-CM

## 2023-12-20 LAB
ABO + RH BLD: NORMAL
ALBUMIN SERPL-MCNC: 4 G/DL (ref 3.5–5)
ALBUMIN/GLOB SERPL: 1 (ref 1.1–2.2)
ALP SERPL-CCNC: 104 U/L (ref 45–117)
ALT SERPL-CCNC: 20 U/L (ref 12–78)
ANION GAP SERPL CALC-SCNC: 8 MMOL/L (ref 5–15)
AST SERPL-CCNC: 19 U/L (ref 15–37)
BASOPHILS # BLD: 0 K/UL (ref 0–0.1)
BASOPHILS NFR BLD: 0 % (ref 0–1)
BILIRUB SERPL-MCNC: 0.3 MG/DL (ref 0.2–1)
BLOOD GROUP ANTIBODIES SERPL: NORMAL
BUN SERPL-MCNC: 19 MG/DL (ref 6–20)
BUN/CREAT SERPL: 16 (ref 12–20)
CALCIUM SERPL-MCNC: 9.4 MG/DL (ref 8.5–10.1)
CHLORIDE SERPL-SCNC: 103 MMOL/L (ref 97–108)
CO2 SERPL-SCNC: 27 MMOL/L (ref 21–32)
CREAT SERPL-MCNC: 1.21 MG/DL (ref 0.55–1.02)
DIFFERENTIAL METHOD BLD: ABNORMAL
EOSINOPHIL # BLD: 0.2 K/UL (ref 0–0.4)
EOSINOPHIL NFR BLD: 3 % (ref 0–7)
ERYTHROCYTE [DISTWIDTH] IN BLOOD BY AUTOMATED COUNT: 14.8 % (ref 11.5–14.5)
GLOBULIN SER CALC-MCNC: 3.9 G/DL (ref 2–4)
GLUCOSE SERPL-MCNC: 114 MG/DL (ref 65–100)
HCT VFR BLD AUTO: 34.9 % (ref 35–47)
HEMOCCULT STL QL: POSITIVE
HGB BLD-MCNC: 11.5 G/DL (ref 11.5–16)
IMM GRANULOCYTES # BLD AUTO: 0 K/UL (ref 0–0.04)
IMM GRANULOCYTES NFR BLD AUTO: 0 % (ref 0–0.5)
INR PPP: 1.1 (ref 0.9–1.1)
LIPASE SERPL-CCNC: 27 U/L (ref 13–75)
LYMPHOCYTES # BLD: 2.6 K/UL (ref 0.8–3.5)
LYMPHOCYTES NFR BLD: 35 % (ref 12–49)
MCH RBC QN AUTO: 29.2 PG (ref 26–34)
MCHC RBC AUTO-ENTMCNC: 33 G/DL (ref 30–36.5)
MCV RBC AUTO: 88.6 FL (ref 80–99)
MONOCYTES # BLD: 0.6 K/UL (ref 0–1)
MONOCYTES NFR BLD: 8 % (ref 5–13)
NEUTS SEG # BLD: 4.1 K/UL (ref 1.8–8)
NEUTS SEG NFR BLD: 54 % (ref 32–75)
NRBC # BLD: 0 K/UL (ref 0–0.01)
NRBC BLD-RTO: 0 PER 100 WBC
PLATELET # BLD AUTO: 203 K/UL (ref 150–400)
PMV BLD AUTO: 10.4 FL (ref 8.9–12.9)
POTASSIUM SERPL-SCNC: 3.8 MMOL/L (ref 3.5–5.1)
PROT SERPL-MCNC: 7.9 G/DL (ref 6.4–8.2)
PROTHROMBIN TIME: 11.2 SEC (ref 9–11.1)
RBC # BLD AUTO: 3.94 M/UL (ref 3.8–5.2)
SODIUM SERPL-SCNC: 138 MMOL/L (ref 136–145)
SPECIMEN EXP DATE BLD: NORMAL
WBC # BLD AUTO: 7.6 K/UL (ref 3.6–11)

## 2023-12-20 PROCEDURE — 86900 BLOOD TYPING SEROLOGIC ABO: CPT

## 2023-12-20 PROCEDURE — 85025 COMPLETE CBC W/AUTO DIFF WBC: CPT

## 2023-12-20 PROCEDURE — 74177 CT ABD & PELVIS W/CONTRAST: CPT

## 2023-12-20 PROCEDURE — 86850 RBC ANTIBODY SCREEN: CPT

## 2023-12-20 PROCEDURE — 82272 OCCULT BLD FECES 1-3 TESTS: CPT

## 2023-12-20 PROCEDURE — 99285 EMERGENCY DEPT VISIT HI MDM: CPT

## 2023-12-20 PROCEDURE — G0378 HOSPITAL OBSERVATION PER HR: HCPCS

## 2023-12-20 PROCEDURE — 6360000002 HC RX W HCPCS: Performed by: NURSE PRACTITIONER

## 2023-12-20 PROCEDURE — 0202U NFCT DS 22 TRGT SARS-COV-2: CPT

## 2023-12-20 PROCEDURE — 86901 BLOOD TYPING SEROLOGIC RH(D): CPT

## 2023-12-20 PROCEDURE — 83690 ASSAY OF LIPASE: CPT

## 2023-12-20 PROCEDURE — 36415 COLL VENOUS BLD VENIPUNCTURE: CPT

## 2023-12-20 PROCEDURE — 80053 COMPREHEN METABOLIC PANEL: CPT

## 2023-12-20 PROCEDURE — A4216 STERILE WATER/SALINE, 10 ML: HCPCS | Performed by: NURSE PRACTITIONER

## 2023-12-20 PROCEDURE — 2580000003 HC RX 258: Performed by: NURSE PRACTITIONER

## 2023-12-20 PROCEDURE — C9113 INJ PANTOPRAZOLE SODIUM, VIA: HCPCS | Performed by: NURSE PRACTITIONER

## 2023-12-20 PROCEDURE — 96374 THER/PROPH/DIAG INJ IV PUSH: CPT

## 2023-12-20 PROCEDURE — 6370000000 HC RX 637 (ALT 250 FOR IP): Performed by: NURSE PRACTITIONER

## 2023-12-20 PROCEDURE — 85610 PROTHROMBIN TIME: CPT

## 2023-12-20 PROCEDURE — 6360000004 HC RX CONTRAST MEDICATION: Performed by: PHYSICIAN ASSISTANT

## 2023-12-20 RX ORDER — ACETAMINOPHEN 325 MG/1
650 TABLET ORAL EVERY 6 HOURS PRN
Status: DISCONTINUED | OUTPATIENT
Start: 2023-12-20 | End: 2023-12-25 | Stop reason: HOSPADM

## 2023-12-20 RX ORDER — POLYETHYLENE GLYCOL 3350 17 G/17G
17 POWDER, FOR SOLUTION ORAL 2 TIMES DAILY
COMMUNITY

## 2023-12-20 RX ORDER — LEVOTHYROXINE SODIUM 88 UG/1
88 TABLET ORAL
Status: DISCONTINUED | OUTPATIENT
Start: 2023-12-21 | End: 2023-12-25 | Stop reason: HOSPADM

## 2023-12-20 RX ORDER — AMLODIPINE BESYLATE 5 MG/1
5 TABLET ORAL DAILY
Status: DISCONTINUED | OUTPATIENT
Start: 2023-12-21 | End: 2023-12-25 | Stop reason: HOSPADM

## 2023-12-20 RX ORDER — POTASSIUM CHLORIDE 7.45 MG/ML
10 INJECTION INTRAVENOUS PRN
Status: DISCONTINUED | OUTPATIENT
Start: 2023-12-20 | End: 2023-12-25 | Stop reason: HOSPADM

## 2023-12-20 RX ORDER — SODIUM CHLORIDE 9 MG/ML
INJECTION, SOLUTION INTRAVENOUS CONTINUOUS
Status: DISCONTINUED | OUTPATIENT
Start: 2023-12-20 | End: 2023-12-22

## 2023-12-20 RX ORDER — LANOLIN ALCOHOL/MO/W.PET/CERES
3 CREAM (GRAM) TOPICAL NIGHTLY PRN
Status: DISCONTINUED | OUTPATIENT
Start: 2023-12-20 | End: 2023-12-25 | Stop reason: HOSPADM

## 2023-12-20 RX ORDER — ONDANSETRON 4 MG/1
4 TABLET, ORALLY DISINTEGRATING ORAL EVERY 8 HOURS PRN
Status: DISCONTINUED | OUTPATIENT
Start: 2023-12-20 | End: 2023-12-21 | Stop reason: SDUPTHER

## 2023-12-20 RX ORDER — POTASSIUM CHLORIDE 20 MEQ/1
40 TABLET, EXTENDED RELEASE ORAL PRN
Status: DISCONTINUED | OUTPATIENT
Start: 2023-12-20 | End: 2023-12-25 | Stop reason: HOSPADM

## 2023-12-20 RX ORDER — FUROSEMIDE 40 MG/1
40 TABLET ORAL DAILY
Status: DISCONTINUED | OUTPATIENT
Start: 2023-12-21 | End: 2023-12-25 | Stop reason: HOSPADM

## 2023-12-20 RX ORDER — DICYCLOMINE HYDROCHLORIDE 10 MG/1
10 CAPSULE ORAL 2 TIMES DAILY PRN
Status: DISCONTINUED | OUTPATIENT
Start: 2023-12-20 | End: 2023-12-25 | Stop reason: HOSPADM

## 2023-12-20 RX ORDER — METOPROLOL SUCCINATE 25 MG/1
12.5 TABLET, EXTENDED RELEASE ORAL
Status: DISCONTINUED | OUTPATIENT
Start: 2023-12-20 | End: 2023-12-25 | Stop reason: HOSPADM

## 2023-12-20 RX ORDER — DILTIAZEM HYDROCHLORIDE 120 MG/1
120 CAPSULE, COATED, EXTENDED RELEASE ORAL
Status: DISCONTINUED | OUTPATIENT
Start: 2023-12-20 | End: 2023-12-25 | Stop reason: HOSPADM

## 2023-12-20 RX ORDER — ATORVASTATIN CALCIUM 10 MG/1
10 TABLET, FILM COATED ORAL NIGHTLY
Status: DISCONTINUED | OUTPATIENT
Start: 2023-12-20 | End: 2023-12-25 | Stop reason: HOSPADM

## 2023-12-20 RX ORDER — POTASSIUM CHLORIDE 750 MG/1
20 TABLET, FILM COATED, EXTENDED RELEASE ORAL DAILY
Status: DISCONTINUED | OUTPATIENT
Start: 2023-12-21 | End: 2023-12-25 | Stop reason: HOSPADM

## 2023-12-20 RX ORDER — MAGNESIUM SULFATE IN WATER 40 MG/ML
2000 INJECTION, SOLUTION INTRAVENOUS PRN
Status: DISCONTINUED | OUTPATIENT
Start: 2023-12-20 | End: 2023-12-25 | Stop reason: HOSPADM

## 2023-12-20 RX ORDER — SODIUM CHLORIDE 0.9 % (FLUSH) 0.9 %
5-40 SYRINGE (ML) INJECTION PRN
Status: DISCONTINUED | OUTPATIENT
Start: 2023-12-20 | End: 2023-12-25 | Stop reason: HOSPADM

## 2023-12-20 RX ORDER — POLYETHYLENE GLYCOL 3350 17 G/17G
17 POWDER, FOR SOLUTION ORAL DAILY PRN
Status: DISCONTINUED | OUTPATIENT
Start: 2023-12-20 | End: 2023-12-25 | Stop reason: HOSPADM

## 2023-12-20 RX ORDER — CETIRIZINE HYDROCHLORIDE 10 MG/1
10 TABLET ORAL DAILY PRN
Status: DISCONTINUED | OUTPATIENT
Start: 2023-12-20 | End: 2023-12-25 | Stop reason: HOSPADM

## 2023-12-20 RX ORDER — ONDANSETRON 4 MG/1
4 TABLET, ORALLY DISINTEGRATING ORAL EVERY 8 HOURS PRN
Status: DISCONTINUED | OUTPATIENT
Start: 2023-12-20 | End: 2023-12-25 | Stop reason: HOSPADM

## 2023-12-20 RX ORDER — ACETAMINOPHEN 650 MG/1
650 SUPPOSITORY RECTAL EVERY 6 HOURS PRN
Status: DISCONTINUED | OUTPATIENT
Start: 2023-12-20 | End: 2023-12-25 | Stop reason: HOSPADM

## 2023-12-20 RX ORDER — SODIUM CHLORIDE 0.9 % (FLUSH) 0.9 %
5-40 SYRINGE (ML) INJECTION EVERY 12 HOURS SCHEDULED
Status: DISCONTINUED | OUTPATIENT
Start: 2023-12-20 | End: 2023-12-25 | Stop reason: HOSPADM

## 2023-12-20 RX ORDER — ONDANSETRON 2 MG/ML
4 INJECTION INTRAMUSCULAR; INTRAVENOUS EVERY 6 HOURS PRN
Status: DISCONTINUED | OUTPATIENT
Start: 2023-12-20 | End: 2023-12-25 | Stop reason: HOSPADM

## 2023-12-20 RX ORDER — ONDANSETRON 2 MG/ML
4 INJECTION INTRAMUSCULAR; INTRAVENOUS EVERY 6 HOURS PRN
Status: DISCONTINUED | OUTPATIENT
Start: 2023-12-20 | End: 2023-12-21 | Stop reason: SDUPTHER

## 2023-12-20 RX ORDER — FLUTICASONE PROPIONATE 50 MCG
2 SPRAY, SUSPENSION (ML) NASAL DAILY PRN
Status: DISCONTINUED | OUTPATIENT
Start: 2023-12-20 | End: 2023-12-25 | Stop reason: HOSPADM

## 2023-12-20 RX ORDER — SODIUM CHLORIDE 9 MG/ML
INJECTION, SOLUTION INTRAVENOUS PRN
Status: DISCONTINUED | OUTPATIENT
Start: 2023-12-20 | End: 2023-12-25 | Stop reason: HOSPADM

## 2023-12-20 RX ADMIN — ATORVASTATIN CALCIUM 10 MG: 10 TABLET, FILM COATED ORAL at 23:34

## 2023-12-20 RX ADMIN — PANTOPRAZOLE SODIUM 40 MG: 40 INJECTION, POWDER, FOR SOLUTION INTRAVENOUS at 23:35

## 2023-12-20 RX ADMIN — METOPROLOL SUCCINATE 12.5 MG: 25 TABLET, EXTENDED RELEASE ORAL at 23:34

## 2023-12-20 RX ADMIN — IOPAMIDOL 100 ML: 755 INJECTION, SOLUTION INTRAVENOUS at 18:40

## 2023-12-20 RX ADMIN — SODIUM CHLORIDE, PRESERVATIVE FREE 10 ML: 5 INJECTION INTRAVENOUS at 23:49

## 2023-12-21 LAB
ANION GAP SERPL CALC-SCNC: 3 MMOL/L (ref 5–15)
APTT PPP: 25.9 SEC (ref 22.1–31)
B PERT DNA SPEC QL NAA+PROBE: NOT DETECTED
BORDETELLA PARAPERTUSSIS BY PCR: NOT DETECTED
BUN SERPL-MCNC: 15 MG/DL (ref 6–20)
BUN/CREAT SERPL: 14 (ref 12–20)
C PNEUM DNA SPEC QL NAA+PROBE: NOT DETECTED
CALCIUM SERPL-MCNC: 8.7 MG/DL (ref 8.5–10.1)
CHLORIDE SERPL-SCNC: 106 MMOL/L (ref 97–108)
CO2 SERPL-SCNC: 29 MMOL/L (ref 21–32)
CREAT SERPL-MCNC: 1.05 MG/DL (ref 0.55–1.02)
FLUAV SUBTYP SPEC NAA+PROBE: NOT DETECTED
FLUBV RNA SPEC QL NAA+PROBE: NOT DETECTED
GLUCOSE SERPL-MCNC: 105 MG/DL (ref 65–100)
HADV DNA SPEC QL NAA+PROBE: NOT DETECTED
HCOV 229E RNA SPEC QL NAA+PROBE: NOT DETECTED
HCOV HKU1 RNA SPEC QL NAA+PROBE: NOT DETECTED
HCOV NL63 RNA SPEC QL NAA+PROBE: NOT DETECTED
HCOV OC43 RNA SPEC QL NAA+PROBE: NOT DETECTED
HCT VFR BLD AUTO: 30.1 % (ref 35–47)
HCT VFR BLD AUTO: 34.4 % (ref 35–47)
HCT VFR BLD AUTO: 35.6 % (ref 35–47)
HGB BLD-MCNC: 10 G/DL (ref 11.5–16)
HGB BLD-MCNC: 11.1 G/DL (ref 11.5–16)
HGB BLD-MCNC: 11.5 G/DL (ref 11.5–16)
HMPV RNA SPEC QL NAA+PROBE: NOT DETECTED
HPIV1 RNA SPEC QL NAA+PROBE: NOT DETECTED
HPIV2 RNA SPEC QL NAA+PROBE: NOT DETECTED
HPIV3 RNA SPEC QL NAA+PROBE: NOT DETECTED
HPIV4 RNA SPEC QL NAA+PROBE: NOT DETECTED
INR PPP: 1.1 (ref 0.9–1.1)
IRON SATN MFR SERPL: 13 % (ref 20–50)
IRON SERPL-MCNC: 34 UG/DL (ref 35–150)
M PNEUMO DNA SPEC QL NAA+PROBE: NOT DETECTED
POTASSIUM SERPL-SCNC: 3.9 MMOL/L (ref 3.5–5.1)
PROTHROMBIN TIME: 11.6 SEC (ref 9–11.1)
RSV RNA SPEC QL NAA+PROBE: NOT DETECTED
RV+EV RNA SPEC QL NAA+PROBE: NOT DETECTED
SARS-COV-2 RNA RESP QL NAA+PROBE: NOT DETECTED
SODIUM SERPL-SCNC: 138 MMOL/L (ref 136–145)
THERAPEUTIC RANGE: NORMAL SECS (ref 58–77)
TIBC SERPL-MCNC: 267 UG/DL (ref 250–450)

## 2023-12-21 PROCEDURE — G0378 HOSPITAL OBSERVATION PER HR: HCPCS

## 2023-12-21 PROCEDURE — 36415 COLL VENOUS BLD VENIPUNCTURE: CPT

## 2023-12-21 PROCEDURE — C9113 INJ PANTOPRAZOLE SODIUM, VIA: HCPCS | Performed by: NURSE PRACTITIONER

## 2023-12-21 PROCEDURE — 6360000002 HC RX W HCPCS: Performed by: NURSE PRACTITIONER

## 2023-12-21 PROCEDURE — 85730 THROMBOPLASTIN TIME PARTIAL: CPT

## 2023-12-21 PROCEDURE — A4216 STERILE WATER/SALINE, 10 ML: HCPCS | Performed by: NURSE PRACTITIONER

## 2023-12-21 PROCEDURE — 96376 TX/PRO/DX INJ SAME DRUG ADON: CPT

## 2023-12-21 PROCEDURE — 2580000003 HC RX 258: Performed by: NURSE PRACTITIONER

## 2023-12-21 PROCEDURE — 85018 HEMOGLOBIN: CPT

## 2023-12-21 PROCEDURE — 80048 BASIC METABOLIC PNL TOTAL CA: CPT

## 2023-12-21 PROCEDURE — 85610 PROTHROMBIN TIME: CPT

## 2023-12-21 PROCEDURE — 83540 ASSAY OF IRON: CPT

## 2023-12-21 PROCEDURE — 85014 HEMATOCRIT: CPT

## 2023-12-21 PROCEDURE — 6370000000 HC RX 637 (ALT 250 FOR IP): Performed by: NURSE PRACTITIONER

## 2023-12-21 PROCEDURE — 83550 IRON BINDING TEST: CPT

## 2023-12-21 RX ADMIN — AMLODIPINE BESYLATE 5 MG: 5 TABLET ORAL at 08:42

## 2023-12-21 RX ADMIN — SODIUM CHLORIDE: 9 INJECTION, SOLUTION INTRAVENOUS at 00:53

## 2023-12-21 RX ADMIN — DILTIAZEM HYDROCHLORIDE 120 MG: 120 CAPSULE, EXTENDED RELEASE ORAL at 00:50

## 2023-12-21 RX ADMIN — ACETAMINOPHEN 650 MG: 325 TABLET ORAL at 21:28

## 2023-12-21 RX ADMIN — SODIUM CHLORIDE, PRESERVATIVE FREE 10 ML: 5 INJECTION INTRAVENOUS at 21:28

## 2023-12-21 RX ADMIN — PANTOPRAZOLE SODIUM 40 MG: 40 INJECTION, POWDER, FOR SOLUTION INTRAVENOUS at 08:42

## 2023-12-21 RX ADMIN — POTASSIUM CHLORIDE 20 MEQ: 750 TABLET, FILM COATED, EXTENDED RELEASE ORAL at 08:42

## 2023-12-21 RX ADMIN — ATORVASTATIN CALCIUM 10 MG: 10 TABLET, FILM COATED ORAL at 21:28

## 2023-12-21 RX ADMIN — DILTIAZEM HYDROCHLORIDE 120 MG: 120 CAPSULE, EXTENDED RELEASE ORAL at 21:28

## 2023-12-21 RX ADMIN — SODIUM CHLORIDE, PRESERVATIVE FREE 10 ML: 5 INJECTION INTRAVENOUS at 08:46

## 2023-12-21 RX ADMIN — FUROSEMIDE 40 MG: 40 TABLET ORAL at 08:42

## 2023-12-21 RX ADMIN — MELATONIN 3 MG: at 21:28

## 2023-12-21 RX ADMIN — METOPROLOL SUCCINATE 12.5 MG: 25 TABLET, EXTENDED RELEASE ORAL at 21:28

## 2023-12-21 RX ADMIN — LEVOTHYROXINE SODIUM 88 MCG: 0.1 TABLET ORAL at 06:19

## 2023-12-21 NOTE — ED NOTES
Pt asked to changed into hospital own at this time. Pt stated they are more comfortable in their own clothes and would prefer not to change into gown. Pt is alert, oriented, and ambulatory at this time.

## 2023-12-21 NOTE — ED NOTES
2255: Attempted to call report to Med Surg at this time, nobody answered phone. 2308: Attempted to call report to Med Surg at this time, RN to call back shortly, call back number given. 2328: Attempted to call report again to Med Surg at this time, RN states he needs more time and will call back shortly.

## 2023-12-21 NOTE — PLAN OF CARE
Problem: Discharge Planning  Goal: Discharge to home or other facility with appropriate resources  Recent Flowsheet Documentation  Taken 12/21/2023 0019 by Georgina Barnhart RN  Discharge to home or other facility with appropriate resources:   Identify barriers to discharge with patient and caregiver   Arrange for needed discharge resources and transportation as appropriate   Identify discharge learning needs (meds, wound care, etc)   Arrange for interpreters to assist at discharge as needed   Refer to discharge planning if patient needs post-hospital services based on physician order or complex needs related to functional status, cognitive ability or social support system     Problem: Discharge Planning  Goal: Discharge to home or other facility with appropriate resources  Outcome: Progressing  Flowsheets (Taken 12/21/2023 0019)  Discharge to home or other facility with appropriate resources:   Identify barriers to discharge with patient and caregiver   Arrange for needed discharge resources and transportation as appropriate   Identify discharge learning needs (meds, wound care, etc)   Arrange for interpreters to assist at discharge as needed   Refer to discharge planning if patient needs post-hospital services based on physician order or complex needs related to functional status, cognitive ability or social support system     Problem: Safety - Adult  Goal: Free from fall injury  Outcome: Progressing     Problem: Chronic Conditions and Co-morbidities  Goal: Patient's chronic conditions and co-morbidity symptoms are monitored and maintained or improved  Outcome: Progressing  Flowsheets (Taken 12/21/2023 0019)  Care Plan - Patient's Chronic Conditions and Co-Morbidity Symptoms are Monitored and Maintained or Improved:   Monitor and assess patient's chronic conditions and comorbid symptoms for stability, deterioration, or improvement   Collaborate with multidisciplinary team to address chronic and comorbid conditions

## 2023-12-21 NOTE — H&P
within normal limits. The study was done in atrial flutter. _______________________________________________________________________    TOTAL TIME:  65 Minutes      Signed: LAURA Romano CNP    Procedures: see electronic medical records for all procedures/Xrays and details which were not copied into this note but were reviewed prior to creation of Plan.

## 2023-12-22 PROBLEM — K92.2 GI BLEED: Status: ACTIVE | Noted: 2023-12-22

## 2023-12-22 LAB
HCT VFR BLD AUTO: 29.9 % (ref 35–47)
HCT VFR BLD AUTO: 36.1 % (ref 35–47)
HGB BLD-MCNC: 11.9 G/DL (ref 11.5–16)
HGB BLD-MCNC: 9.5 G/DL (ref 11.5–16)

## 2023-12-22 PROCEDURE — A4216 STERILE WATER/SALINE, 10 ML: HCPCS | Performed by: NURSE PRACTITIONER

## 2023-12-22 PROCEDURE — 1100000003 HC PRIVATE W/ TELEMETRY

## 2023-12-22 PROCEDURE — 6370000000 HC RX 637 (ALT 250 FOR IP): Performed by: NURSE PRACTITIONER

## 2023-12-22 PROCEDURE — 85018 HEMOGLOBIN: CPT

## 2023-12-22 PROCEDURE — 96376 TX/PRO/DX INJ SAME DRUG ADON: CPT

## 2023-12-22 PROCEDURE — 36415 COLL VENOUS BLD VENIPUNCTURE: CPT

## 2023-12-22 PROCEDURE — 2580000003 HC RX 258: Performed by: NURSE PRACTITIONER

## 2023-12-22 PROCEDURE — 6360000002 HC RX W HCPCS: Performed by: NURSE PRACTITIONER

## 2023-12-22 PROCEDURE — 85014 HEMATOCRIT: CPT

## 2023-12-22 PROCEDURE — C9113 INJ PANTOPRAZOLE SODIUM, VIA: HCPCS | Performed by: NURSE PRACTITIONER

## 2023-12-22 RX ADMIN — SODIUM CHLORIDE, PRESERVATIVE FREE 10 ML: 5 INJECTION INTRAVENOUS at 21:22

## 2023-12-22 RX ADMIN — AMLODIPINE BESYLATE 5 MG: 5 TABLET ORAL at 09:53

## 2023-12-22 RX ADMIN — FUROSEMIDE 40 MG: 40 TABLET ORAL at 09:53

## 2023-12-22 RX ADMIN — METOPROLOL SUCCINATE 12.5 MG: 25 TABLET, EXTENDED RELEASE ORAL at 21:19

## 2023-12-22 RX ADMIN — POTASSIUM CHLORIDE 20 MEQ: 750 TABLET, FILM COATED, EXTENDED RELEASE ORAL at 09:53

## 2023-12-22 RX ADMIN — PANTOPRAZOLE SODIUM 40 MG: 40 INJECTION, POWDER, FOR SOLUTION INTRAVENOUS at 09:52

## 2023-12-22 RX ADMIN — LEVOTHYROXINE SODIUM 88 MCG: 0.1 TABLET ORAL at 05:48

## 2023-12-22 RX ADMIN — DILTIAZEM HYDROCHLORIDE 120 MG: 120 CAPSULE, EXTENDED RELEASE ORAL at 21:20

## 2023-12-22 RX ADMIN — MELATONIN 3 MG: at 22:15

## 2023-12-22 RX ADMIN — ATORVASTATIN CALCIUM 10 MG: 10 TABLET, FILM COATED ORAL at 21:20

## 2023-12-22 NOTE — CARE COORDINATION
Care Management Initial Assessment       RUR: n/a  Readmission? No  1st IM letter given? No  1st  letter given: No       12/22/23 8463   Service Assessment   Patient Orientation Alert and Oriented   Cognition Alert   History Provided By Patient   Primary Caregiver Self   Support Systems Family Members; Children;[employer] [employer];Friends/Neighbors   Patient's Healthcare Decision Maker is: Named in [address]   PCP Verified by CM Yes  Carlie Pratt)   Last Visit to PCP Within last 3 months   Prior Functional Level Independent in ADLs/IADLs   Current Functional Level Independent in ADLs/IADLs   Can patient return to prior living arrangement Yes   Ability to make needs known: Good   Family able to assist with home care needs: Yes   Would you like for me to discuss the discharge plan with any other family members/significant others, and if so, who? No   Financial Resources Medicare   Social/Functional History   Lives With Alone   Type of [address] [place] [address] One level   Home Access Level entry   [address] [facility] Help From Family;Friend(s); Neighbor   ADL Assistance Independent   Homemaking Assistance Independent   Ambulation Assistance Independent   Transfer Assistance Independent   Active  Yes   Discharge Planning   Type of [address] [place] [address] Prior To Admission None   Potential Assistance Needed N/A   Patient expects to be discharged to: House     CM met with pt at bedside to complete assessment, describe role of CM, and discuss d/c planning. Pt stated she uses no DME, has no history of HH/SNF/IPR. She uses the [facility], which is closed on the weekend. If pt discharges over the weekend, [facility] will use [facility]. Grandson or daughter will provide transport upon d/c. No CM needs identified at this time. Cm will follow.     Katelyn Smith, [address] [facility], [address] [facility]

## 2023-12-23 LAB
ANION GAP SERPL CALC-SCNC: 5 MMOL/L (ref 5–15)
BUN SERPL-MCNC: 10 MG/DL (ref 6–20)
BUN/CREAT SERPL: 9 (ref 12–20)
CALCIUM SERPL-MCNC: 8.9 MG/DL (ref 8.5–10.1)
CHLORIDE SERPL-SCNC: 104 MMOL/L (ref 97–108)
CO2 SERPL-SCNC: 29 MMOL/L (ref 21–32)
CREAT SERPL-MCNC: 1.06 MG/DL (ref 0.55–1.02)
ERYTHROCYTE [DISTWIDTH] IN BLOOD BY AUTOMATED COUNT: 14.9 % (ref 11.5–14.5)
GLUCOSE SERPL-MCNC: 106 MG/DL (ref 65–100)
HCT VFR BLD AUTO: 31 % (ref 35–47)
HCT VFR BLD AUTO: 36.2 % (ref 35–47)
HGB BLD-MCNC: 10.1 G/DL (ref 11.5–16)
HGB BLD-MCNC: 12 G/DL (ref 11.5–16)
MCH RBC QN AUTO: 28.9 PG (ref 26–34)
MCHC RBC AUTO-ENTMCNC: 32.6 G/DL (ref 30–36.5)
MCV RBC AUTO: 88.6 FL (ref 80–99)
NRBC # BLD: 0 K/UL (ref 0–0.01)
NRBC BLD-RTO: 0 PER 100 WBC
PLATELET # BLD AUTO: 185 K/UL (ref 150–400)
PMV BLD AUTO: 10.9 FL (ref 8.9–12.9)
POTASSIUM SERPL-SCNC: 3.8 MMOL/L (ref 3.5–5.1)
RBC # BLD AUTO: 3.5 M/UL (ref 3.8–5.2)
SODIUM SERPL-SCNC: 138 MMOL/L (ref 136–145)
WBC # BLD AUTO: 4.9 K/UL (ref 3.6–11)

## 2023-12-23 PROCEDURE — A4216 STERILE WATER/SALINE, 10 ML: HCPCS | Performed by: NURSE PRACTITIONER

## 2023-12-23 PROCEDURE — 36415 COLL VENOUS BLD VENIPUNCTURE: CPT

## 2023-12-23 PROCEDURE — 85014 HEMATOCRIT: CPT

## 2023-12-23 PROCEDURE — 85018 HEMOGLOBIN: CPT

## 2023-12-23 PROCEDURE — 1100000003 HC PRIVATE W/ TELEMETRY

## 2023-12-23 PROCEDURE — 6370000000 HC RX 637 (ALT 250 FOR IP): Performed by: NURSE PRACTITIONER

## 2023-12-23 PROCEDURE — C9113 INJ PANTOPRAZOLE SODIUM, VIA: HCPCS | Performed by: NURSE PRACTITIONER

## 2023-12-23 PROCEDURE — 6360000002 HC RX W HCPCS: Performed by: NURSE PRACTITIONER

## 2023-12-23 PROCEDURE — 2580000003 HC RX 258: Performed by: NURSE PRACTITIONER

## 2023-12-23 PROCEDURE — 80048 BASIC METABOLIC PNL TOTAL CA: CPT

## 2023-12-23 PROCEDURE — 85027 COMPLETE CBC AUTOMATED: CPT

## 2023-12-23 RX ADMIN — ATORVASTATIN CALCIUM 10 MG: 10 TABLET, FILM COATED ORAL at 22:36

## 2023-12-23 RX ADMIN — POTASSIUM CHLORIDE 20 MEQ: 750 TABLET, FILM COATED, EXTENDED RELEASE ORAL at 09:49

## 2023-12-23 RX ADMIN — AMLODIPINE BESYLATE 5 MG: 5 TABLET ORAL at 09:48

## 2023-12-23 RX ADMIN — PANTOPRAZOLE SODIUM 40 MG: 40 INJECTION, POWDER, FOR SOLUTION INTRAVENOUS at 09:49

## 2023-12-23 RX ADMIN — FUROSEMIDE 40 MG: 40 TABLET ORAL at 09:48

## 2023-12-23 RX ADMIN — DILTIAZEM HYDROCHLORIDE 120 MG: 120 CAPSULE, EXTENDED RELEASE ORAL at 22:35

## 2023-12-23 RX ADMIN — SODIUM CHLORIDE, PRESERVATIVE FREE 10 ML: 5 INJECTION INTRAVENOUS at 22:36

## 2023-12-23 RX ADMIN — SODIUM CHLORIDE, PRESERVATIVE FREE 10 ML: 5 INJECTION INTRAVENOUS at 09:49

## 2023-12-23 RX ADMIN — METOPROLOL SUCCINATE 12.5 MG: 25 TABLET, EXTENDED RELEASE ORAL at 22:35

## 2023-12-23 RX ADMIN — LEVOTHYROXINE SODIUM 88 MCG: 0.1 TABLET ORAL at 06:39

## 2023-12-23 RX ADMIN — MELATONIN 3 MG: at 22:45

## 2023-12-23 NOTE — PLAN OF CARE
Problem: Discharge Planning  Goal: Discharge to home or other facility with appropriate resources  Outcome: Progressing     Problem: Safety - Adult  Goal: Free from fall injury  Outcome: Progressing     Problem: Chronic Conditions and Co-morbidities  Goal: Patient's chronic conditions and co-morbidity symptoms are monitored and maintained or improved  Outcome: Progressing     Problem: Respiratory - Adult  Goal: Achieves optimal ventilation and oxygenation  Outcome: Progressing     Problem: Cardiovascular - Adult  Goal: Maintains optimal cardiac output and hemodynamic stability  Outcome: Progressing     Problem: Skin/Tissue Integrity - Adult  Goal: Skin integrity remains intact  Outcome: Progressing  Goal: Incisions, wounds, or drain sites healing without S/S of infection  Outcome: Progressing  Goal: Oral mucous membranes remain intact  Outcome: Progressing     Problem: Musculoskeletal - Adult  Goal: Maintain proper alignment of affected body part  Outcome: Progressing  Goal: Return ADL status to a safe level of function  Outcome: Progressing

## 2023-12-24 LAB
ANION GAP SERPL CALC-SCNC: 7 MMOL/L (ref 5–15)
BUN SERPL-MCNC: 13 MG/DL (ref 6–20)
BUN/CREAT SERPL: 12 (ref 12–20)
CALCIUM SERPL-MCNC: 9.4 MG/DL (ref 8.5–10.1)
CHLORIDE SERPL-SCNC: 104 MMOL/L (ref 97–108)
CO2 SERPL-SCNC: 26 MMOL/L (ref 21–32)
CREAT SERPL-MCNC: 1.08 MG/DL (ref 0.55–1.02)
ERYTHROCYTE [DISTWIDTH] IN BLOOD BY AUTOMATED COUNT: 14.6 % (ref 11.5–14.5)
GLUCOSE BLD STRIP.AUTO-MCNC: 108 MG/DL (ref 65–117)
GLUCOSE SERPL-MCNC: 108 MG/DL (ref 65–100)
HCT VFR BLD AUTO: 33.4 % (ref 35–47)
HGB BLD-MCNC: 10.9 G/DL (ref 11.5–16)
MCH RBC QN AUTO: 28.7 PG (ref 26–34)
MCHC RBC AUTO-ENTMCNC: 32.6 G/DL (ref 30–36.5)
MCV RBC AUTO: 87.9 FL (ref 80–99)
NRBC # BLD: 0 K/UL (ref 0–0.01)
NRBC BLD-RTO: 0 PER 100 WBC
PLATELET # BLD AUTO: 211 K/UL (ref 150–400)
PMV BLD AUTO: 11.1 FL (ref 8.9–12.9)
POTASSIUM SERPL-SCNC: 3.7 MMOL/L (ref 3.5–5.1)
RBC # BLD AUTO: 3.8 M/UL (ref 3.8–5.2)
SERVICE CMNT-IMP: NORMAL
SODIUM SERPL-SCNC: 137 MMOL/L (ref 136–145)
WBC # BLD AUTO: 5 K/UL (ref 3.6–11)

## 2023-12-24 PROCEDURE — 82962 GLUCOSE BLOOD TEST: CPT

## 2023-12-24 PROCEDURE — 6360000002 HC RX W HCPCS: Performed by: NURSE PRACTITIONER

## 2023-12-24 PROCEDURE — C9113 INJ PANTOPRAZOLE SODIUM, VIA: HCPCS | Performed by: NURSE PRACTITIONER

## 2023-12-24 PROCEDURE — 1100000003 HC PRIVATE W/ TELEMETRY

## 2023-12-24 PROCEDURE — 6370000000 HC RX 637 (ALT 250 FOR IP): Performed by: NURSE PRACTITIONER

## 2023-12-24 PROCEDURE — 36415 COLL VENOUS BLD VENIPUNCTURE: CPT

## 2023-12-24 PROCEDURE — A4216 STERILE WATER/SALINE, 10 ML: HCPCS | Performed by: NURSE PRACTITIONER

## 2023-12-24 PROCEDURE — 85027 COMPLETE CBC AUTOMATED: CPT

## 2023-12-24 PROCEDURE — 2580000003 HC RX 258: Performed by: NURSE PRACTITIONER

## 2023-12-24 PROCEDURE — 80048 BASIC METABOLIC PNL TOTAL CA: CPT

## 2023-12-24 RX ADMIN — AMLODIPINE BESYLATE 5 MG: 5 TABLET ORAL at 08:41

## 2023-12-24 RX ADMIN — POTASSIUM CHLORIDE 20 MEQ: 750 TABLET, FILM COATED, EXTENDED RELEASE ORAL at 08:41

## 2023-12-24 RX ADMIN — POLYETHYLENE GLYCOL 3350 17 G: 17 POWDER, FOR SOLUTION ORAL at 18:12

## 2023-12-24 RX ADMIN — ATORVASTATIN CALCIUM 10 MG: 10 TABLET, FILM COATED ORAL at 21:02

## 2023-12-24 RX ADMIN — FUROSEMIDE 40 MG: 40 TABLET ORAL at 08:41

## 2023-12-24 RX ADMIN — SALINE NASAL SPRAY 1 SPRAY: 1.5 SOLUTION NASAL at 05:10

## 2023-12-24 RX ADMIN — LEVOTHYROXINE SODIUM 88 MCG: 0.1 TABLET ORAL at 06:58

## 2023-12-24 RX ADMIN — SALINE NASAL SPRAY 1 SPRAY: 1.5 SOLUTION NASAL at 21:04

## 2023-12-24 RX ADMIN — CETIRIZINE HYDROCHLORIDE 10 MG: 10 TABLET, FILM COATED ORAL at 04:04

## 2023-12-24 RX ADMIN — SODIUM CHLORIDE, PRESERVATIVE FREE 10 ML: 5 INJECTION INTRAVENOUS at 21:06

## 2023-12-24 RX ADMIN — DILTIAZEM HYDROCHLORIDE 120 MG: 120 CAPSULE, EXTENDED RELEASE ORAL at 21:02

## 2023-12-24 RX ADMIN — METOPROLOL SUCCINATE 12.5 MG: 25 TABLET, EXTENDED RELEASE ORAL at 21:02

## 2023-12-24 RX ADMIN — PANTOPRAZOLE SODIUM 40 MG: 40 INJECTION, POWDER, FOR SOLUTION INTRAVENOUS at 08:42

## 2023-12-24 RX ADMIN — CETIRIZINE HYDROCHLORIDE 10 MG: 10 TABLET, FILM COATED ORAL at 21:06

## 2023-12-24 RX ADMIN — SODIUM CHLORIDE, PRESERVATIVE FREE 10 ML: 5 INJECTION INTRAVENOUS at 08:42

## 2023-12-24 RX ADMIN — MELATONIN 3 MG: at 21:03

## 2023-12-25 VITALS
WEIGHT: 153 LBS | OXYGEN SATURATION: 97 % | TEMPERATURE: 97.7 F | SYSTOLIC BLOOD PRESSURE: 130 MMHG | RESPIRATION RATE: 17 BRPM | BODY MASS INDEX: 27.98 KG/M2 | DIASTOLIC BLOOD PRESSURE: 73 MMHG | HEART RATE: 69 BPM

## 2023-12-25 LAB
ANION GAP SERPL CALC-SCNC: 7 MMOL/L (ref 5–15)
BUN SERPL-MCNC: 21 MG/DL (ref 6–20)
BUN/CREAT SERPL: 18 (ref 12–20)
CALCIUM SERPL-MCNC: 8.8 MG/DL (ref 8.5–10.1)
CHLORIDE SERPL-SCNC: 104 MMOL/L (ref 97–108)
CO2 SERPL-SCNC: 25 MMOL/L (ref 21–32)
CREAT SERPL-MCNC: 1.16 MG/DL (ref 0.55–1.02)
ERYTHROCYTE [DISTWIDTH] IN BLOOD BY AUTOMATED COUNT: 14.6 % (ref 11.5–14.5)
GLUCOSE SERPL-MCNC: 108 MG/DL (ref 65–100)
HCT VFR BLD AUTO: 32.8 % (ref 35–47)
HGB BLD-MCNC: 10.5 G/DL (ref 11.5–16)
MCH RBC QN AUTO: 28.3 PG (ref 26–34)
MCHC RBC AUTO-ENTMCNC: 32 G/DL (ref 30–36.5)
MCV RBC AUTO: 88.4 FL (ref 80–99)
NRBC # BLD: 0 K/UL (ref 0–0.01)
NRBC BLD-RTO: 0 PER 100 WBC
PLATELET # BLD AUTO: 189 K/UL (ref 150–400)
PMV BLD AUTO: 10.4 FL (ref 8.9–12.9)
POTASSIUM SERPL-SCNC: 3.7 MMOL/L (ref 3.5–5.1)
RBC # BLD AUTO: 3.71 M/UL (ref 3.8–5.2)
SODIUM SERPL-SCNC: 136 MMOL/L (ref 136–145)
WBC # BLD AUTO: 4.6 K/UL (ref 3.6–11)

## 2023-12-25 PROCEDURE — 36415 COLL VENOUS BLD VENIPUNCTURE: CPT

## 2023-12-25 PROCEDURE — 6370000000 HC RX 637 (ALT 250 FOR IP): Performed by: NURSE PRACTITIONER

## 2023-12-25 PROCEDURE — 2580000003 HC RX 258: Performed by: NURSE PRACTITIONER

## 2023-12-25 PROCEDURE — 6360000002 HC RX W HCPCS: Performed by: NURSE PRACTITIONER

## 2023-12-25 PROCEDURE — 85027 COMPLETE CBC AUTOMATED: CPT

## 2023-12-25 PROCEDURE — A4216 STERILE WATER/SALINE, 10 ML: HCPCS | Performed by: NURSE PRACTITIONER

## 2023-12-25 PROCEDURE — C9113 INJ PANTOPRAZOLE SODIUM, VIA: HCPCS | Performed by: NURSE PRACTITIONER

## 2023-12-25 PROCEDURE — 80048 BASIC METABOLIC PNL TOTAL CA: CPT

## 2023-12-25 RX ORDER — METOPROLOL SUCCINATE 25 MG/1
12.5 TABLET, EXTENDED RELEASE ORAL
Qty: 30 TABLET | Refills: 3 | Status: SHIPPED | OUTPATIENT
Start: 2023-12-25

## 2023-12-25 RX ORDER — OMEPRAZOLE 40 MG/1
40 CAPSULE, DELAYED RELEASE ORAL EVERY 12 HOURS
Qty: 30 CAPSULE | Refills: 0 | Status: SHIPPED | OUTPATIENT
Start: 2023-12-25

## 2023-12-25 RX ADMIN — POLYETHYLENE GLYCOL 3350 17 G: 17 POWDER, FOR SOLUTION ORAL at 08:47

## 2023-12-25 RX ADMIN — FUROSEMIDE 40 MG: 40 TABLET ORAL at 08:48

## 2023-12-25 RX ADMIN — LEVOTHYROXINE SODIUM 88 MCG: 0.1 TABLET ORAL at 06:20

## 2023-12-25 RX ADMIN — SODIUM CHLORIDE, PRESERVATIVE FREE 10 ML: 5 INJECTION INTRAVENOUS at 08:52

## 2023-12-25 RX ADMIN — PANTOPRAZOLE SODIUM 40 MG: 40 INJECTION, POWDER, FOR SOLUTION INTRAVENOUS at 08:48

## 2023-12-25 RX ADMIN — AMLODIPINE BESYLATE 5 MG: 5 TABLET ORAL at 08:48

## 2023-12-25 RX ADMIN — POTASSIUM CHLORIDE 20 MEQ: 750 TABLET, FILM COATED, EXTENDED RELEASE ORAL at 08:48

## 2023-12-25 NOTE — DISCHARGE SUMMARY
Hospitalist Discharge Summary     Patient ID:  Quentin Olmedo  [id number]  47 y.o.  1945 12/20/2023    PCP on record: Jett Matson MD    Admit date: 12/20/2023  Discharge date and time: 12/25/2023    DISCHARGE DIAGNOSIS:    GI bleed  Hypertension,   Chf EF 55-60% recent CHANEL 12/07/23  Hyperlipidemia  Afib s/p ablation 12/07/23 dr machado  TIA 6/2023  Chronic constipation  Allergic rhinitis  Recent gi bleed on ATC 10/2023  Hypothyroidism  Gerd  CHAU- cpap at home, requests low flow o2 while inpt    CONSULTATIONS:  IP CONSULT TO HOSPITALIST  IP CONSULT TO GI  IP CONSULT TO CARDIOLOGY    Excerpted HPI from H&P of Denis Miller MD:    Quentin Olmedo is a 78 y.o.  female with PMHx significant for htn, hld, afib s/p ablation 12/7/23, TIA, allergic rhinitis, chronic constipation, recent GI bleed 10/2023 w/colonoscopy, hypothyroidism, GERD, chau w/home cpap. Patient reported she is coming in because she talked with her pcp and felt like she was having another GI bleed as she did recently in October 2023. Pt reported she had her ablation for afib 12/07/23 with CHANEL, and states she never bounced back like she thought she would, with continued to feel tired. She followed up with cardiology per their RECs and was cleared per pt. Pt states over the past 2-3 days she developed worse abdominal cramps and increased flatulence, and had approximately 8 episodes of black to bright red bloody diarrhea stools today. None reported since having came to the ER this evening. Pt typically has constipation requiring miralax bid and metamucil bid. Pt denies dizziness, lightheadedness, chest pain, nausea, vomiting, feeling faint, dyspnea. Pt does report a non-productive cough past 2 days, denies any fevers, chills, infectious exposures at home, no other risk factors as d/w pt. Pt is requesting a covid test while here in the hospital. No adventitious lung sounds noted on exam, VSS, no increased work of breathing.  No

## 2023-12-25 NOTE — PLAN OF CARE
Problem: Discharge Planning  Goal: Discharge to home or other facility with appropriate resources  12/25/2023 1310 by Akira Slater RN  Outcome: Adequate for Discharge  12/25/2023 1034 by Akira Slater RN  Outcome: Progressing     Problem: Safety - Adult  Goal: Free from fall injury  12/25/2023 1310 by Akira Slater RN  Outcome: Adequate for Discharge  12/25/2023 1034 by Akira Slater RN  Outcome: Progressing     Problem: Chronic Conditions and Co-morbidities  Goal: Patient's chronic conditions and co-morbidity symptoms are monitored and maintained or improved  12/25/2023 1310 by Akira Slater RN  Outcome: Adequate for Discharge  12/25/2023 1034 by Akira Slater RN  Outcome: Progressing     Problem: Respiratory - Adult  Goal: Achieves optimal ventilation and oxygenation  12/25/2023 1310 by Akira Slater RN  Outcome: Adequate for Discharge  12/25/2023 1034 by Akira Slater RN  Outcome: Progressing     Problem: Cardiovascular - Adult  Goal: Maintains optimal cardiac output and hemodynamic stability  12/25/2023 1310 by Akira Slater RN  Outcome: Adequate for Discharge  12/25/2023 1034 by Akira Slater RN  Outcome: Progressing     Problem: Skin/Tissue Integrity - Adult  Goal: Skin integrity remains intact  12/25/2023 1310 by Akira Slater RN  Outcome: Adequate for Discharge  12/25/2023 1034 by Akira Slater RN  Outcome: Progressing  Goal: Incisions, wounds, or drain sites healing without S/S of infection  12/25/2023 1310 by Akira Slater RN  Outcome: Adequate for Discharge  12/25/2023 1034 by Akira Slater RN  Outcome: Progressing  Goal: Oral mucous membranes remain intact  12/25/2023 1310 by Akira Slater RN  Outcome: Adequate for Discharge  12/25/2023 1034 by Akira Slater RN  Outcome: Progressing     Problem: Musculoskeletal - Adult  Goal: Maintain proper alignment of affected body part  12/25/2023 1310 by Akira Slater RN  Outcome: Adequate for Discharge  12/25/2023 1034 by

## 2024-02-22 ENCOUNTER — HOSPITAL ENCOUNTER (OUTPATIENT)
Facility: HOSPITAL | Age: 79
Discharge: HOME | End: 2024-02-22
Payer: MEDICARE

## 2024-02-22 ENCOUNTER — HOSPITAL ENCOUNTER (OUTPATIENT)
Facility: HOSPITAL | Age: 79
End: 2024-02-22
Payer: MEDICARE

## 2024-02-22 VITALS
HEIGHT: 62 IN | DIASTOLIC BLOOD PRESSURE: 68 MMHG | TEMPERATURE: 97.4 F | SYSTOLIC BLOOD PRESSURE: 122 MMHG | HEART RATE: 81 BPM | WEIGHT: 153 LBS | RESPIRATION RATE: 18 BRPM | OXYGEN SATURATION: 100 % | BODY MASS INDEX: 28.16 KG/M2

## 2024-02-22 LAB
ANION GAP SERPL CALC-SCNC: 6 MMOL/L (ref 5–15)
APPEARANCE UR: CLEAR
BACTERIA URNS QL MICRO: NEGATIVE /HPF
BILIRUB UR QL: NEGATIVE
BUN SERPL-MCNC: 20 MG/DL (ref 6–20)
BUN/CREAT SERPL: 18 (ref 12–20)
CALCIUM SERPL-MCNC: 9.2 MG/DL (ref 8.5–10.1)
CHLORIDE SERPL-SCNC: 101 MMOL/L (ref 97–108)
CO2 SERPL-SCNC: 29 MMOL/L (ref 21–32)
COLOR UR: ABNORMAL
CREAT SERPL-MCNC: 1.14 MG/DL (ref 0.55–1.02)
EPITH CASTS URNS QL MICRO: ABNORMAL /LPF
ERYTHROCYTE [DISTWIDTH] IN BLOOD BY AUTOMATED COUNT: 14.5 % (ref 11.5–14.5)
GLUCOSE SERPL-MCNC: 121 MG/DL (ref 65–100)
GLUCOSE UR STRIP.AUTO-MCNC: NEGATIVE MG/DL
HCT VFR BLD AUTO: 38.7 % (ref 35–47)
HGB BLD-MCNC: 12.3 G/DL (ref 11.5–16)
HGB UR QL STRIP: ABNORMAL
HYALINE CASTS URNS QL MICRO: ABNORMAL /LPF (ref 0–2)
KETONES UR QL STRIP.AUTO: NEGATIVE MG/DL
LEUKOCYTE ESTERASE UR QL STRIP.AUTO: NEGATIVE
MAGNESIUM SERPL-MCNC: 2.1 MG/DL (ref 1.6–2.4)
MCH RBC QN AUTO: 27.5 PG (ref 26–34)
MCHC RBC AUTO-ENTMCNC: 31.8 G/DL (ref 30–36.5)
MCV RBC AUTO: 86.6 FL (ref 80–99)
NITRITE UR QL STRIP.AUTO: NEGATIVE
NRBC # BLD: 0 K/UL (ref 0–0.01)
NRBC BLD-RTO: 0 PER 100 WBC
PH UR STRIP: 7 (ref 5–8)
PLATELET # BLD AUTO: 185 K/UL (ref 150–400)
PMV BLD AUTO: 9.8 FL (ref 8.9–12.9)
POTASSIUM SERPL-SCNC: 4.3 MMOL/L (ref 3.5–5.1)
PROT UR STRIP-MCNC: NEGATIVE MG/DL
RBC # BLD AUTO: 4.47 M/UL (ref 3.8–5.2)
RBC #/AREA URNS HPF: ABNORMAL /HPF (ref 0–5)
SODIUM SERPL-SCNC: 136 MMOL/L (ref 136–145)
SP GR UR REFRACTOMETRY: <1.005
URINE CULTURE IF INDICATED: ABNORMAL
UROBILINOGEN UR QL STRIP.AUTO: 0.2 EU/DL (ref 0.2–1)
WBC # BLD AUTO: 5.5 K/UL (ref 3.6–11)
WBC URNS QL MICRO: ABNORMAL /HPF (ref 0–4)

## 2024-02-22 PROCEDURE — 36415 COLL VENOUS BLD VENIPUNCTURE: CPT

## 2024-02-22 PROCEDURE — 71046 X-RAY EXAM CHEST 2 VIEWS: CPT

## 2024-02-22 PROCEDURE — 85027 COMPLETE CBC AUTOMATED: CPT

## 2024-02-22 PROCEDURE — 83735 ASSAY OF MAGNESIUM: CPT

## 2024-02-22 PROCEDURE — 81001 URINALYSIS AUTO W/SCOPE: CPT

## 2024-02-22 PROCEDURE — 80048 BASIC METABOLIC PNL TOTAL CA: CPT

## 2024-02-22 NOTE — PERIOP NOTE
Pt reported during PAT appt that she caught RSV last month around 1/17. Still has lingering cough. Called surgeon's office to make aware.

## 2024-02-23 LAB
EKG ATRIAL RATE: 79 BPM
EKG DIAGNOSIS: NORMAL
EKG Q-T INTERVAL: 372 MS
EKG QRS DURATION: 90 MS
EKG QTC CALCULATION (BAZETT): 426 MS
EKG R AXIS: 53 DEGREES
EKG T AXIS: 108 DEGREES
EKG VENTRICULAR RATE: 79 BPM

## 2024-03-08 ENCOUNTER — HOSPITAL ENCOUNTER (OUTPATIENT)
Facility: HOSPITAL | Age: 79
End: 2024-03-08
Payer: MEDICARE

## 2024-03-08 VITALS
WEIGHT: 153 LBS | HEIGHT: 62 IN | RESPIRATION RATE: 18 BRPM | BODY MASS INDEX: 28.16 KG/M2 | TEMPERATURE: 97.8 F | DIASTOLIC BLOOD PRESSURE: 67 MMHG | HEART RATE: 80 BPM | SYSTOLIC BLOOD PRESSURE: 140 MMHG | OXYGEN SATURATION: 100 %

## 2024-03-08 LAB
ANION GAP SERPL CALC-SCNC: 5 MMOL/L (ref 5–15)
APPEARANCE UR: CLEAR
BACTERIA URNS QL MICRO: NEGATIVE /HPF
BILIRUB UR QL: NEGATIVE
BUN SERPL-MCNC: 26 MG/DL (ref 6–20)
BUN/CREAT SERPL: 22 (ref 12–20)
CALCIUM SERPL-MCNC: 8.9 MG/DL (ref 8.5–10.1)
CHLORIDE SERPL-SCNC: 103 MMOL/L (ref 97–108)
CO2 SERPL-SCNC: 29 MMOL/L (ref 21–32)
COLOR UR: ABNORMAL
CREAT SERPL-MCNC: 1.19 MG/DL (ref 0.55–1.02)
EPITH CASTS URNS QL MICRO: ABNORMAL /LPF
ERYTHROCYTE [DISTWIDTH] IN BLOOD BY AUTOMATED COUNT: 14.6 % (ref 11.5–14.5)
GLUCOSE SERPL-MCNC: 129 MG/DL (ref 65–100)
GLUCOSE UR STRIP.AUTO-MCNC: NEGATIVE MG/DL
HCT VFR BLD AUTO: 38.1 % (ref 35–47)
HGB BLD-MCNC: 12.2 G/DL (ref 11.5–16)
HGB UR QL STRIP: ABNORMAL
HYALINE CASTS URNS QL MICRO: ABNORMAL /LPF (ref 0–2)
KETONES UR QL STRIP.AUTO: NEGATIVE MG/DL
LEUKOCYTE ESTERASE UR QL STRIP.AUTO: NEGATIVE
MAGNESIUM SERPL-MCNC: 2 MG/DL (ref 1.6–2.4)
MCH RBC QN AUTO: 27.8 PG (ref 26–34)
MCHC RBC AUTO-ENTMCNC: 32 G/DL (ref 30–36.5)
MCV RBC AUTO: 86.8 FL (ref 80–99)
NITRITE UR QL STRIP.AUTO: NEGATIVE
NRBC # BLD: 0 K/UL (ref 0–0.01)
NRBC BLD-RTO: 0 PER 100 WBC
PH UR STRIP: 6.5 (ref 5–8)
PLATELET # BLD AUTO: 174 K/UL (ref 150–400)
PMV BLD AUTO: 10.9 FL (ref 8.9–12.9)
POTASSIUM SERPL-SCNC: 3.9 MMOL/L (ref 3.5–5.1)
PROT UR STRIP-MCNC: NEGATIVE MG/DL
RBC # BLD AUTO: 4.39 M/UL (ref 3.8–5.2)
RBC #/AREA URNS HPF: ABNORMAL /HPF (ref 0–5)
SODIUM SERPL-SCNC: 137 MMOL/L (ref 136–145)
SP GR UR REFRACTOMETRY: 1
URINE CULTURE IF INDICATED: ABNORMAL
UROBILINOGEN UR QL STRIP.AUTO: 0.2 EU/DL (ref 0.2–1)
WBC # BLD AUTO: 5.4 K/UL (ref 3.6–11)
WBC URNS QL MICRO: ABNORMAL /HPF (ref 0–4)

## 2024-03-08 PROCEDURE — 85027 COMPLETE CBC AUTOMATED: CPT

## 2024-03-08 PROCEDURE — 83735 ASSAY OF MAGNESIUM: CPT

## 2024-03-08 PROCEDURE — 80048 BASIC METABOLIC PNL TOTAL CA: CPT

## 2024-03-08 PROCEDURE — 36415 COLL VENOUS BLD VENIPUNCTURE: CPT

## 2024-03-08 PROCEDURE — 71046 X-RAY EXAM CHEST 2 VIEWS: CPT

## 2024-03-08 PROCEDURE — 81001 URINALYSIS AUTO W/SCOPE: CPT

## 2024-03-08 RX ORDER — AZELASTINE 1 MG/ML
2 SPRAY, METERED NASAL DAILY PRN
COMMUNITY

## 2024-03-08 ASSESSMENT — PAIN SCALES - GENERAL: PAINLEVEL_OUTOF10: 0

## 2024-03-08 NOTE — PROGRESS NOTES
Patient reports feeling better. Came in today for PAT for Watchman procedure scheduled 3/14/2024. Instructions referred to Dr Campoverde. Patient verbalizes understanding.

## 2024-03-14 ENCOUNTER — ANESTHESIA EVENT (OUTPATIENT)
Facility: HOSPITAL | Age: 79
End: 2024-03-14
Payer: MEDICARE

## 2024-03-14 ENCOUNTER — HOSPITAL ENCOUNTER (INPATIENT)
Facility: HOSPITAL | Age: 79
LOS: 1 days | Discharge: HOME | End: 2024-03-15
Attending: INTERNAL MEDICINE | Admitting: INTERNAL MEDICINE
Payer: MEDICARE

## 2024-03-14 ENCOUNTER — ANESTHESIA (OUTPATIENT)
Facility: HOSPITAL | Age: 79
End: 2024-03-14
Payer: MEDICARE

## 2024-03-14 ENCOUNTER — HOSPITAL ENCOUNTER (OUTPATIENT)
Facility: HOSPITAL | Age: 79
Discharge: HOME | End: 2024-03-16
Attending: INTERNAL MEDICINE
Payer: MEDICARE

## 2024-03-14 DIAGNOSIS — I48.91 ATRIAL FIBRILLATION (HCC): ICD-10-CM

## 2024-03-14 PROBLEM — I48.0 PAROXYSMAL ATRIAL FIBRILLATION (HCC): Status: ACTIVE | Noted: 2024-03-14

## 2024-03-14 LAB — ACT BLD: 304 SECS (ref 79–138)

## 2024-03-14 PROCEDURE — 2060000000 HC ICU INTERMEDIATE R&B

## 2024-03-14 PROCEDURE — 2500000003 HC RX 250 WO HCPCS: Performed by: NURSE ANESTHETIST, CERTIFIED REGISTERED

## 2024-03-14 PROCEDURE — 33340 PERQ CLSR TCAT L ATR APNDGE: CPT | Performed by: INTERNAL MEDICINE

## 2024-03-14 PROCEDURE — C1889 IMPLANT/INSERT DEVICE, NOC: HCPCS | Performed by: INTERNAL MEDICINE

## 2024-03-14 PROCEDURE — 2709999900 HC NON-CHARGEABLE SUPPLY: Performed by: INTERNAL MEDICINE

## 2024-03-14 PROCEDURE — C1894 INTRO/SHEATH, NON-LASER: HCPCS | Performed by: INTERNAL MEDICINE

## 2024-03-14 PROCEDURE — 3700000001 HC ADD 15 MINUTES (ANESTHESIA): Performed by: INTERNAL MEDICINE

## 2024-03-14 PROCEDURE — 2580000003 HC RX 258: Performed by: NURSE ANESTHETIST, CERTIFIED REGISTERED

## 2024-03-14 PROCEDURE — 6360000002 HC RX W HCPCS: Performed by: NURSE ANESTHETIST, CERTIFIED REGISTERED

## 2024-03-14 PROCEDURE — 7100000001 HC PACU RECOVERY - ADDTL 15 MIN: Performed by: INTERNAL MEDICINE

## 2024-03-14 PROCEDURE — 2580000003 HC RX 258: Performed by: INTERNAL MEDICINE

## 2024-03-14 PROCEDURE — C1769 GUIDE WIRE: HCPCS | Performed by: INTERNAL MEDICINE

## 2024-03-14 PROCEDURE — 3700000000 HC ANESTHESIA ATTENDED CARE: Performed by: INTERNAL MEDICINE

## 2024-03-14 PROCEDURE — 6370000000 HC RX 637 (ALT 250 FOR IP): Performed by: INTERNAL MEDICINE

## 2024-03-14 PROCEDURE — 85347 COAGULATION TIME ACTIVATED: CPT

## 2024-03-14 PROCEDURE — 7100000000 HC PACU RECOVERY - FIRST 15 MIN: Performed by: INTERNAL MEDICINE

## 2024-03-14 DEVICE — LEFT ATRIAL APPENDAGE CLOSURE DEVICE WITH DELIVERY SYSTEM
Type: IMPLANTABLE DEVICE | Status: FUNCTIONAL
Brand: WATCHMAN FLX™

## 2024-03-14 RX ORDER — ACETAMINOPHEN 325 MG/1
650 TABLET ORAL EVERY 4 HOURS PRN
Status: DISCONTINUED | OUTPATIENT
Start: 2024-03-14 | End: 2024-03-15 | Stop reason: HOSPADM

## 2024-03-14 RX ORDER — SODIUM CHLORIDE 0.9 % (FLUSH) 0.9 %
5-40 SYRINGE (ML) INJECTION EVERY 12 HOURS SCHEDULED
Status: DISCONTINUED | OUTPATIENT
Start: 2024-03-14 | End: 2024-03-15 | Stop reason: HOSPADM

## 2024-03-14 RX ORDER — FUROSEMIDE 40 MG/1
40 TABLET ORAL DAILY
Status: DISCONTINUED | OUTPATIENT
Start: 2024-03-14 | End: 2024-03-15 | Stop reason: HOSPADM

## 2024-03-14 RX ORDER — LEVOTHYROXINE SODIUM 88 UG/1
88 TABLET ORAL
Status: DISCONTINUED | OUTPATIENT
Start: 2024-03-15 | End: 2024-03-15 | Stop reason: HOSPADM

## 2024-03-14 RX ORDER — ROCURONIUM BROMIDE 10 MG/ML
INJECTION, SOLUTION INTRAVENOUS PRN
Status: DISCONTINUED | OUTPATIENT
Start: 2024-03-14 | End: 2024-03-14 | Stop reason: SDUPTHER

## 2024-03-14 RX ORDER — CLOPIDOGREL BISULFATE 75 MG/1
300 TABLET ORAL ONCE
Status: COMPLETED | OUTPATIENT
Start: 2024-03-14 | End: 2024-03-14

## 2024-03-14 RX ORDER — PROTAMINE SULFATE 10 MG/ML
INJECTION, SOLUTION INTRAVENOUS PRN
Status: DISCONTINUED | OUTPATIENT
Start: 2024-03-14 | End: 2024-03-14 | Stop reason: SDUPTHER

## 2024-03-14 RX ORDER — POTASSIUM CHLORIDE 750 MG/1
20 TABLET, FILM COATED, EXTENDED RELEASE ORAL DAILY
Status: DISCONTINUED | OUTPATIENT
Start: 2024-03-14 | End: 2024-03-15 | Stop reason: HOSPADM

## 2024-03-14 RX ORDER — DEXAMETHASONE SODIUM PHOSPHATE 4 MG/ML
INJECTION, SOLUTION INTRA-ARTICULAR; INTRALESIONAL; INTRAMUSCULAR; INTRAVENOUS; SOFT TISSUE PRN
Status: DISCONTINUED | OUTPATIENT
Start: 2024-03-14 | End: 2024-03-14 | Stop reason: SDUPTHER

## 2024-03-14 RX ORDER — ATORVASTATIN CALCIUM 20 MG/1
20 TABLET, FILM COATED ORAL DAILY
Status: DISCONTINUED | OUTPATIENT
Start: 2024-03-14 | End: 2024-03-15 | Stop reason: HOSPADM

## 2024-03-14 RX ORDER — CLOPIDOGREL BISULFATE 75 MG/1
75 TABLET ORAL DAILY
Status: DISCONTINUED | OUTPATIENT
Start: 2024-03-15 | End: 2024-03-15 | Stop reason: HOSPADM

## 2024-03-14 RX ORDER — DILTIAZEM HYDROCHLORIDE 120 MG/1
120 CAPSULE, COATED, EXTENDED RELEASE ORAL DAILY
Status: DISCONTINUED | OUTPATIENT
Start: 2024-03-14 | End: 2024-03-15 | Stop reason: HOSPADM

## 2024-03-14 RX ORDER — 0.9 % SODIUM CHLORIDE 0.9 %
INTRAVENOUS SOLUTION INTRAVENOUS PRN
Status: DISCONTINUED | OUTPATIENT
Start: 2024-03-14 | End: 2024-03-14 | Stop reason: SDUPTHER

## 2024-03-14 RX ORDER — ONDANSETRON 2 MG/ML
INJECTION INTRAMUSCULAR; INTRAVENOUS PRN
Status: DISCONTINUED | OUTPATIENT
Start: 2024-03-14 | End: 2024-03-14 | Stop reason: SDUPTHER

## 2024-03-14 RX ORDER — AMLODIPINE BESYLATE 5 MG/1
5 TABLET ORAL DAILY
Status: DISCONTINUED | OUTPATIENT
Start: 2024-03-14 | End: 2024-03-15 | Stop reason: HOSPADM

## 2024-03-14 RX ORDER — ONDANSETRON 2 MG/ML
4 INJECTION INTRAMUSCULAR; INTRAVENOUS EVERY 6 HOURS PRN
Status: DISCONTINUED | OUTPATIENT
Start: 2024-03-14 | End: 2024-03-15 | Stop reason: HOSPADM

## 2024-03-14 RX ORDER — SODIUM CHLORIDE 9 MG/ML
INJECTION, SOLUTION INTRAVENOUS PRN
Status: DISCONTINUED | OUTPATIENT
Start: 2024-03-14 | End: 2024-03-15 | Stop reason: HOSPADM

## 2024-03-14 RX ORDER — PANTOPRAZOLE SODIUM 40 MG/1
40 TABLET, DELAYED RELEASE ORAL
Status: DISCONTINUED | OUTPATIENT
Start: 2024-03-15 | End: 2024-03-15 | Stop reason: HOSPADM

## 2024-03-14 RX ORDER — ASPIRIN 325 MG
325 TABLET ORAL DAILY
Qty: 30 TABLET | Refills: 11 | Status: SHIPPED | OUTPATIENT
Start: 2024-03-15

## 2024-03-14 RX ORDER — METHOCARBAMOL 500 MG/1
500 TABLET, FILM COATED ORAL 3 TIMES DAILY PRN
Status: DISCONTINUED | OUTPATIENT
Start: 2024-03-14 | End: 2024-03-15 | Stop reason: HOSPADM

## 2024-03-14 RX ORDER — CLOPIDOGREL BISULFATE 75 MG/1
75 TABLET ORAL DAILY
Qty: 30 TABLET | Refills: 1 | Status: SHIPPED | OUTPATIENT
Start: 2024-03-15 | End: 2024-06-13

## 2024-03-14 RX ORDER — AZELASTINE 1 MG/ML
1 SPRAY, METERED NASAL 2 TIMES DAILY
Status: DISCONTINUED | OUTPATIENT
Start: 2024-03-14 | End: 2024-03-15 | Stop reason: HOSPADM

## 2024-03-14 RX ORDER — METOPROLOL SUCCINATE 25 MG/1
12.5 TABLET, EXTENDED RELEASE ORAL
Status: DISCONTINUED | OUTPATIENT
Start: 2024-03-14 | End: 2024-03-15 | Stop reason: HOSPADM

## 2024-03-14 RX ORDER — POLYETHYLENE GLYCOL 3350 17 G/17G
17 POWDER, FOR SOLUTION ORAL 2 TIMES DAILY
Status: DISCONTINUED | OUTPATIENT
Start: 2024-03-14 | End: 2024-03-15 | Stop reason: HOSPADM

## 2024-03-14 RX ORDER — CETIRIZINE HYDROCHLORIDE 10 MG/1
10 TABLET ORAL DAILY PRN
Status: DISCONTINUED | OUTPATIENT
Start: 2024-03-14 | End: 2024-03-15 | Stop reason: HOSPADM

## 2024-03-14 RX ORDER — VIT C/B6/B5/MAGNESIUM/HERB 173 50-5-6-5MG
1000 CAPSULE ORAL DAILY
Status: DISCONTINUED | OUTPATIENT
Start: 2024-03-14 | End: 2024-03-14

## 2024-03-14 RX ORDER — SODIUM CHLORIDE 0.9 % (FLUSH) 0.9 %
5-40 SYRINGE (ML) INJECTION PRN
Status: DISCONTINUED | OUTPATIENT
Start: 2024-03-14 | End: 2024-03-15 | Stop reason: HOSPADM

## 2024-03-14 RX ORDER — OMEGA-3-ACID ETHYL ESTERS 1 G/1
1 CAPSULE, LIQUID FILLED ORAL DAILY
Status: DISCONTINUED | OUTPATIENT
Start: 2024-03-14 | End: 2024-03-15 | Stop reason: HOSPADM

## 2024-03-14 RX ORDER — HYDROXYZINE HYDROCHLORIDE 25 MG/1
50 TABLET, FILM COATED ORAL NIGHTLY PRN
Status: DISCONTINUED | OUTPATIENT
Start: 2024-03-14 | End: 2024-03-15 | Stop reason: HOSPADM

## 2024-03-14 RX ORDER — HEPARIN SODIUM 1000 [USP'U]/ML
INJECTION, SOLUTION INTRAVENOUS; SUBCUTANEOUS PRN
Status: DISCONTINUED | OUTPATIENT
Start: 2024-03-14 | End: 2024-03-14 | Stop reason: SDUPTHER

## 2024-03-14 RX ORDER — FLUTICASONE PROPIONATE 50 MCG
2 SPRAY, SUSPENSION (ML) NASAL DAILY PRN
Status: DISCONTINUED | OUTPATIENT
Start: 2024-03-14 | End: 2024-03-15 | Stop reason: HOSPADM

## 2024-03-14 RX ORDER — ASPIRIN 325 MG
325 TABLET ORAL DAILY
Status: DISCONTINUED | OUTPATIENT
Start: 2024-03-14 | End: 2024-03-15 | Stop reason: HOSPADM

## 2024-03-14 RX ORDER — VITAMIN B COMPLEX
2000 TABLET ORAL DAILY
Status: DISCONTINUED | OUTPATIENT
Start: 2024-03-14 | End: 2024-03-15 | Stop reason: HOSPADM

## 2024-03-14 RX ORDER — DICYCLOMINE HYDROCHLORIDE 10 MG/1
10 CAPSULE ORAL 4 TIMES DAILY PRN
Status: DISCONTINUED | OUTPATIENT
Start: 2024-03-14 | End: 2024-03-15 | Stop reason: HOSPADM

## 2024-03-14 RX ORDER — LIDOCAINE HYDROCHLORIDE 20 MG/ML
INJECTION, SOLUTION EPIDURAL; INFILTRATION; INTRACAUDAL; PERINEURAL PRN
Status: DISCONTINUED | OUTPATIENT
Start: 2024-03-14 | End: 2024-03-14 | Stop reason: SDUPTHER

## 2024-03-14 RX ORDER — FENTANYL CITRATE 50 UG/ML
INJECTION, SOLUTION INTRAMUSCULAR; INTRAVENOUS PRN
Status: DISCONTINUED | OUTPATIENT
Start: 2024-03-14 | End: 2024-03-14 | Stop reason: SDUPTHER

## 2024-03-14 RX ADMIN — FUROSEMIDE 40 MG: 40 TABLET ORAL at 18:39

## 2024-03-14 RX ADMIN — PROPOFOL 80 MG: 10 INJECTION, EMULSION INTRAVENOUS at 12:06

## 2024-03-14 RX ADMIN — SODIUM CHLORIDE, PRESERVATIVE FREE 10 ML: 5 INJECTION INTRAVENOUS at 21:05

## 2024-03-14 RX ADMIN — ASPIRIN 325 MG: 325 TABLET ORAL at 18:39

## 2024-03-14 RX ADMIN — LIDOCAINE HYDROCHLORIDE 60 MG: 20 INJECTION, SOLUTION EPIDURAL; INFILTRATION; INTRACAUDAL; PERINEURAL at 12:02

## 2024-03-14 RX ADMIN — CLOPIDOGREL BISULFATE 300 MG: 75 TABLET ORAL at 18:38

## 2024-03-14 RX ADMIN — AZELASTINE HYDROCHLORIDE 1 SPRAY: 137 SPRAY, METERED NASAL at 21:05

## 2024-03-14 RX ADMIN — METOPROLOL SUCCINATE 12.5 MG: 25 TABLET, EXTENDED RELEASE ORAL at 21:05

## 2024-03-14 RX ADMIN — HEPARIN SODIUM 12000 UNITS: 1000 INJECTION, SOLUTION INTRAVENOUS; SUBCUTANEOUS at 12:27

## 2024-03-14 RX ADMIN — SODIUM CHLORIDE 500 ML: 900 INJECTION, SOLUTION INTRAVENOUS at 13:11

## 2024-03-14 RX ADMIN — DILTIAZEM HYDROCHLORIDE 120 MG: 120 CAPSULE, EXTENDED RELEASE ORAL at 18:38

## 2024-03-14 RX ADMIN — PROPOFOL 20 MG: 10 INJECTION, EMULSION INTRAVENOUS at 12:07

## 2024-03-14 RX ADMIN — FENTANYL CITRATE 100 MCG: 50 INJECTION, SOLUTION INTRAMUSCULAR; INTRAVENOUS at 12:02

## 2024-03-14 RX ADMIN — ATORVASTATIN CALCIUM 20 MG: 20 TABLET, FILM COATED ORAL at 18:38

## 2024-03-14 RX ADMIN — ONDANSETRON HYDROCHLORIDE 4 MG: 2 INJECTION, SOLUTION INTRAMUSCULAR; INTRAVENOUS at 12:24

## 2024-03-14 RX ADMIN — DEXAMETHASONE SODIUM PHOSPHATE 4 MG: 4 INJECTION, SOLUTION INTRAMUSCULAR; INTRAVENOUS at 12:24

## 2024-03-14 RX ADMIN — ROCURONIUM BROMIDE 50 MG: 10 INJECTION INTRAVENOUS at 12:06

## 2024-03-14 RX ADMIN — AMLODIPINE BESYLATE 5 MG: 5 TABLET ORAL at 18:38

## 2024-03-14 RX ADMIN — PROTAMINE SULFATE 70 MG: 10 INJECTION, SOLUTION INTRAVENOUS at 12:45

## 2024-03-14 RX ADMIN — SUGAMMADEX 200 MG: 100 INJECTION, SOLUTION INTRAVENOUS at 12:48

## 2024-03-14 RX ADMIN — POTASSIUM CHLORIDE 20 MEQ: 750 TABLET, FILM COATED, EXTENDED RELEASE ORAL at 18:38

## 2024-03-14 RX ADMIN — Medication 2000 UNITS: at 18:39

## 2024-03-14 RX ADMIN — PSYLLIUM HUSK 1 PACKET: 3.4 POWDER ORAL at 21:05

## 2024-03-14 NOTE — ANESTHESIA PRE PROCEDURE
50 MCG (2000 UT) TABS Take 1 tablet by mouth daily    Automatic Reconciliation, Ar   dicyclomine (BENTYL) 10 MG capsule Take 1 capsule by mouth 4 times daily as needed Takes one to two daily as needed 12/17/09   Automatic Reconciliation, Ar   dilTIAZem (CARDIZEM LA) 120 MG TB24 extended release tablet Take 1 capsule by mouth daily    Automatic Reconciliation, Ar   fluticasone (FLONASE) 50 MCG/ACT nasal spray 2 sprays by Nasal route daily as needed    Automatic Reconciliation, Ar   furosemide (LASIX) 20 MG tablet Take 2 tablets by mouth daily    Automatic Reconciliation, Ar   levothyroxine (SYNTHROID) 88 MCG tablet Take 1 tablet by mouth every morning (before breakfast) Takes 1 tablet of 88 mcg 6 days out of the week and on Saturdays takes 1 & 1/2 tablet    Automatic Reconciliation, Ar   methocarbamol (ROBAXIN) 500 MG tablet Take 1 tablet by mouth 3 times daily as needed    Automatic Reconciliation, Ar   simvastatin (ZOCOR) 20 MG tablet Take 1.5 tablets by mouth nightly    Automatic Reconciliation, Ar       Current medications:    No current facility-administered medications for this encounter.       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   • Atrial fibrillation ([facility]) I48.91   • Total knee replacement status Z96.659   • Acute CVA (cerebrovascular accident) ([facility]) I63.9   • Insomnia G47.00   • TIA (transient ischemic attack) G45.9   • Hypertension I10   • Visual disturbance H53.9   • Left atrial flutter by electrocardiogram ([facility]) I48.92   • Acute gastrointestinal bleeding K92.2   • GI bleed K92.2       Past Medical History:        Diagnosis Date   • Aortic stenosis     secondary to rheumatic fever as a child, s/p AVR with porcine valve 10/09   • Arrhythmia 04/2021    afib/flutter   • Arrhythmia 05/2020    SVT

## 2024-03-14 NOTE — ANESTHESIA POSTPROCEDURE EVALUATION
Department of Anesthesiology  Postprocedure Note    Patient: Roxanna Schuster  MRN: [id number]  YOB: 1945  Date of evaluation: 3/14/2024    Procedure Summary       Date: 03/14/24 Room / Location: [facility] EP LAB / [facility] CARDIAC CATH LAB    Anesthesia Start: 1158 Anesthesia Stop: 1311    Procedure: Watchman yesenia closure device Diagnosis: Atrial fibrillation (HCC)    Providers: Christiano Campoverde MD Responsible Provider: Diamond Rivas DO    Anesthesia Type: General ASA Status: 3            Anesthesia Type: General    Bunny Phase I: Bunny Score: 9    Bunny Phase II:      Anesthesia Post Evaluation    Patient location during evaluation: PACU  Patient participation: complete - patient participated  Level of consciousness: sleepy but conscious  Pain score: 0  Airway patency: patent  Nausea & Vomiting: no nausea and no vomiting  Cardiovascular status: hemodynamically stable  Respiratory status: acceptable  Hydration status: euvolemic  Pain management: adequate    There were no known notable events for this encounter.

## 2024-03-14 NOTE — PROGRESS NOTES
Cardiac Cath Lab Recovery Arrival Note:    Dual skin performed with  ALVA Schuster arrived to Cardiac Cath Lab, Recovery Area. Staff introduced to patient. Patient identifiers verified with NAME and DATE OF BIRTH. Procedure verified with patient. Consent forms reviewed and signed by patient or authorized representative and verified. Allergies verified.     Patient and family oriented to department. Patient and family informed of procedure and plan of care.     Questions answered with review. Patient prepped for procedure, per orders from physician, prior to arrival.    Patient on cardiac monitor, non-invasive blood pressure, SPO2 monitor. On RA. Patient is A&Ox  4. Patient reports no pain.     Patient in stretcher, in low position, with side rails up, call bell within reach, patient instructed to call if assistance as needed.    Patient prep in: [facility] Recovery Area, [place] [address].   Patient family has pager # n/a  Family in: [place].   Prep by: magdiel

## 2024-03-15 VITALS
SYSTOLIC BLOOD PRESSURE: 139 MMHG | HEIGHT: 62 IN | TEMPERATURE: 97.6 F | DIASTOLIC BLOOD PRESSURE: 77 MMHG | BODY MASS INDEX: 27.6 KG/M2 | HEART RATE: 68 BPM | OXYGEN SATURATION: 97 % | RESPIRATION RATE: 12 BRPM | WEIGHT: 150 LBS

## 2024-03-15 LAB — ECHO BSA: 1.72 M2

## 2024-03-15 PROCEDURE — 2700000000 HC OXYGEN THERAPY PER DAY

## 2024-03-15 PROCEDURE — 2580000003 HC RX 258: Performed by: INTERNAL MEDICINE

## 2024-03-15 PROCEDURE — 6370000000 HC RX 637 (ALT 250 FOR IP): Performed by: INTERNAL MEDICINE

## 2024-03-15 PROCEDURE — 02L73DK OCCLUSION OF LEFT ATRIAL APPENDAGE WITH INTRALUMINAL DEVICE, PERCUTANEOUS APPROACH: ICD-10-PCS | Performed by: INTERNAL MEDICINE

## 2024-03-15 PROCEDURE — B245ZZ4 ULTRASONOGRAPHY OF LEFT HEART, TRANSESOPHAGEAL: ICD-10-PCS | Performed by: INTERNAL MEDICINE

## 2024-03-15 RX ADMIN — AZELASTINE HYDROCHLORIDE 1 SPRAY: 137 SPRAY, METERED NASAL at 08:46

## 2024-03-15 RX ADMIN — OMEGA-3-ACID ETHYL ESTERS 1 CAPSULE: 1 CAPSULE, LIQUID FILLED ORAL at 08:45

## 2024-03-15 RX ADMIN — FUROSEMIDE 40 MG: 40 TABLET ORAL at 08:46

## 2024-03-15 RX ADMIN — ATORVASTATIN CALCIUM 20 MG: 20 TABLET, FILM COATED ORAL at 08:46

## 2024-03-15 RX ADMIN — AMLODIPINE BESYLATE 5 MG: 5 TABLET ORAL at 08:45

## 2024-03-15 RX ADMIN — ASPIRIN 325 MG: 325 TABLET ORAL at 08:45

## 2024-03-15 RX ADMIN — DILTIAZEM HYDROCHLORIDE 120 MG: 120 CAPSULE, EXTENDED RELEASE ORAL at 08:45

## 2024-03-15 RX ADMIN — Medication 2000 UNITS: at 08:46

## 2024-03-15 RX ADMIN — SODIUM CHLORIDE, PRESERVATIVE FREE 10 ML: 5 INJECTION INTRAVENOUS at 08:47

## 2024-03-15 RX ADMIN — POTASSIUM CHLORIDE 20 MEQ: 750 TABLET, FILM COATED, EXTENDED RELEASE ORAL at 08:45

## 2024-03-15 RX ADMIN — PSYLLIUM HUSK 1 PACKET: 3.4 POWDER ORAL at 08:43

## 2024-03-15 RX ADMIN — PANTOPRAZOLE SODIUM 40 MG: 40 TABLET, DELAYED RELEASE ORAL at 06:04

## 2024-03-15 RX ADMIN — CLOPIDOGREL BISULFATE 75 MG: 75 TABLET ORAL at 08:45

## 2024-03-15 RX ADMIN — LEVOTHYROXINE SODIUM 88 MCG: 0.09 TABLET ORAL at 06:04

## 2024-03-15 RX ADMIN — POLYETHYLENE GLYCOL 3350 17 G: 17 POWDER, FOR SOLUTION ORAL at 08:43

## 2024-03-15 ASSESSMENT — PAIN SCALES - GENERAL: PAINLEVEL_OUTOF10: 0

## 2024-03-15 NOTE — PLAN OF CARE
Predictive Model Details          34 (Caution)  Factor Value    Calculated 3/15/2024 05:56 37% Age 79 years old    Deterioration Index Model 33% Supplemental oxygen Nasal cannula     27% Respiratory rate 12     3% Systolic 124     0% Pulse oximetry 98 %     0% Temperature 97.3 °F (36.3 °C)     0% Pulse 72        Problem: Chronic Conditions and Co-morbidities  Goal: Patient's chronic conditions and co-morbidity symptoms are monitored and maintained or improved  Outcome: Progressing     Problem: Discharge Planning  Goal: Discharge to home or other facility with appropriate resources  Outcome: Progressing

## 2024-03-15 NOTE — DISCHARGE INSTRUCTIONS
POST-WATCHMAN DISCHARGE INSTRUCTIONS:    You had a Watchman implant on 3/14 with Dr. Campoverde as part of plan to ultimately get you off longterm blood-thinner.  Stop your full blood thinner and take aspirin and clopidogrel instead.    Please call the office to make an appointment with the nurse practitioner Luci Wade in 4-8 weeks [phone].  At that time, an appointment for CHANEL will be made to visualize how well the Watchman has sealed off the left atrial appendage.    Do not drive, operate any machinery, or sign any legal documents for 24 hours after your procedure.  You must have someone to drive you home.    You may take a shower 24 hours after your cardiac procedure.  Be sure to get the dressing wet and then remove it; gently wash the area with warm soapy water.  Pat dry and leave open to air.  To help prevent infections, be sure to keep the cath site clean and dry.  No lotions, creams, powders, ointments, etc. in the cath site for approximately 1 week.    Do not take a tub bath, get in a hot tub or swimming pool for approximately 5 days or until the cath site is completely healed.      No strenuous activity or heavy lifting over 20 lbs. for 7 days.     After your procedure, some bruising or discomfort is common during the healing process.  Tylenol, 1-2 tablets every 6 hours as needed, is recommended if you experience any discomfort.  If you experience any signs or symptoms of infection such as fever, chills, or poorly healing incision, persistent tenderness or swelling in the groin, redness and/or warmth to the touch, numbness, significant tingling or pain at the groin site or affected extremity, rash, drainage from the site, or if the leg feels tight or swollen, call your physician right away.    If bleeding at the site occurs, take a clean gauze pad and apply direct pressure to the groin just above the puncture site, and call your physician right away.    If your procedure involved ablation

## 2024-03-15 NOTE — PROGRESS NOTES
0710- Bedside and Verbal shift change report given to MORENO Jessica RN (oncoming nurse) by SAURABH Zamudio RN (offgoing nurse). Report included the following information Nurse Handoff Report, Intake/Output, MAR, Recent Results, and Cardiac Rhythm NSR .    0755- Shift assessment completed; see flow sheet for details. Pt A/V/Ox4; cooperative; following commands. Currently in NSR; stable BP. Lungs are clear; pt remains on room air; O2 sats >92%. ABD is soft; BS active. Pt voiding via commode. Skin is warm; dry. Right groin puncture site is w/o hematoma; redness or oozing. Dry dressing in place. Pt denies pain/discomfort at this time. Assisted to the chair this AM    0900- Dr. Campoverde at the bedside; plans for discharge today. Pt aware and will contact her transportation     1105-  Discharge instructions given; pt removed from tele and assisted to the bathroom. Pt independent with her morning self care. Pts transportation to arrive around 11:30am.    1120- PIV from R. Arm removed; pressure applied until hemostatic; dry dressing applied.    1134- Pt discharged w/o incident; transported to the main hospital entrance by volunteer services

## 2024-03-15 NOTE — DISCHARGE SUMMARY
Cardiology Discharge Summary (>30 minutes involved in discharge management)         Patient ID:  Roxanna Schuster  [id number]  79 y.o.  1945    Admit Date: 3/14/2024     Discharge Date: 3/15/2024   Admitting Physician: Christiano Campoverde MD   Discharge Physician: Christiano Campoverde MD    Admission and Discharge Diagnoses include:   Paroxysmal atrial fibrillation    Cardiology Procedures this Admission:   Watchman implant    Hospital Course and Discharge Exam:  Admitted for Watchman implant, successful.  Changed to DAPT.  On the day of discharge, ambulatory and taking oral.  All questions answered.  Aware of signs and symptoms warranting urgent medical follow-up or calling 911.    /77   Pulse 72   Temp 97.3 °F (36.3 °C) (Oral)   Resp 12   Ht 1.562 m (5' 1.5\")   Wt 68 kg (150 lb)   SpO2 98%   BMI 27.88 kg/m²     Physical Exam:  Nondiaphoretic, not in acute distress.  Unlabored, clear to auscultation bilaterally.  Regular rate and rhythm, no murmur, rub, or gallop.  No JVD or peripheral edema.  Palpable radial pulses bilaterally.  R groin site OK.  No cyanosis.  Skin warm and dry.  Awake, appropriate, neuro grossly nonfocal.  Ambulatory.    Disposition: home    Patient Instructions:   Current Discharge Medication List        START taking these medications    Details   aspirin 325 MG tablet Take 1 tablet by mouth daily  Qty: 30 tablet, Refills: 11      clopidogrel (PLAVIX) 75 MG tablet Take 1 tablet by mouth daily  Qty: 30 tablet, Refills: 1           CONTINUE these medications which have NOT CHANGED    Details   azelastine (ASTELIN) 0.1 % nasal spray 2 sprays by Nasal route daily as needed for Rhinitis Use in each nostril as directed      omeprazole (PRILOSEC) 40 MG delayed release capsule Take 1 capsule by mouth in the morning and 1 capsule in the evening.  Qty: 30 capsule, Refills: 0      polyethylene glycol (MIRALAX) 17 g PACK packet Take 17 g by mouth 2 times daily Indications: Constipation

## 2024-08-29 ASSESSMENT — ENCOUNTER SYMPTOMS
GASTROINTESTINAL NEGATIVE: 1
RESPIRATORY NEGATIVE: 1

## 2024-08-30 ENCOUNTER — OFFICE VISIT (OUTPATIENT)
Age: 79
End: 2024-08-30
Payer: MEDICARE

## 2024-08-30 VITALS
WEIGHT: 150.4 LBS | TEMPERATURE: 98 F | BODY MASS INDEX: 27.68 KG/M2 | DIASTOLIC BLOOD PRESSURE: 60 MMHG | HEIGHT: 62 IN | OXYGEN SATURATION: 98 % | SYSTOLIC BLOOD PRESSURE: 144 MMHG | HEART RATE: 65 BPM | RESPIRATION RATE: 16 BRPM

## 2024-08-30 DIAGNOSIS — G45.9 TIA (TRANSIENT ISCHEMIC ATTACK): Primary | ICD-10-CM

## 2024-08-30 DIAGNOSIS — G47.00 INSOMNIA, UNSPECIFIED TYPE: ICD-10-CM

## 2024-08-30 DIAGNOSIS — H53.9 VISUAL DISTURBANCE: ICD-10-CM

## 2024-08-30 PROCEDURE — G8419 CALC BMI OUT NRM PARAM NOF/U: HCPCS

## 2024-08-30 PROCEDURE — 1090F PRES/ABSN URINE INCON ASSESS: CPT

## 2024-08-30 PROCEDURE — 99215 OFFICE O/P EST HI 40 MIN: CPT

## 2024-08-30 PROCEDURE — 1123F ACP DISCUSS/DSCN MKR DOCD: CPT

## 2024-08-30 PROCEDURE — G8427 DOCREV CUR MEDS BY ELIG CLIN: HCPCS

## 2024-08-30 PROCEDURE — 3078F DIAST BP <80 MM HG: CPT

## 2024-08-30 PROCEDURE — 1036F TOBACCO NON-USER: CPT

## 2024-08-30 PROCEDURE — G8399 PT W/DXA RESULTS DOCUMENT: HCPCS

## 2024-08-30 PROCEDURE — 3077F SYST BP >= 140 MM HG: CPT

## 2024-08-30 RX ORDER — ATORVASTATIN CALCIUM 20 MG/1
TABLET, FILM COATED ORAL
COMMUNITY
Start: 2024-08-20

## 2024-08-30 ASSESSMENT — PATIENT HEALTH QUESTIONNAIRE - PHQ9
SUM OF ALL RESPONSES TO PHQ QUESTIONS 1-9: 0
1. LITTLE INTEREST OR PLEASURE IN DOING THINGS: NOT AT ALL
SUM OF ALL RESPONSES TO PHQ QUESTIONS 1-9: 0
SUM OF ALL RESPONSES TO PHQ QUESTIONS 1-9: 0
SUM OF ALL RESPONSES TO PHQ9 QUESTIONS 1 & 2: 0
SUM OF ALL RESPONSES TO PHQ QUESTIONS 1-9: 0
2. FEELING DOWN, DEPRESSED OR HOPELESS: NOT AT ALL

## 2024-08-30 NOTE — PROGRESS NOTES
[facility] NEUROLOGY CLINIC  In Office FOLLOW-UP VISIT         Roxanna Schuster is a 79 y.o. female who presents today for the following:  Chief Complaint   Patient presents with    Follow-up     One year follow up. Follow up on tia and states that sleep is not so good but the sleep apnea doc is workig on that.  Meds seem to be good. Has the watchman installed and is going to be off of plavix in two weeks.          ASSESSMENT AND PLAN  Patient is known to this practice.  Chart and history reviewed in detail at today's office visit.    1. TIA (transient ischemic attack)  Assessment & Plan:  Stable without recurrence of TIA symptoms    Patient is to continue on DAPT therapy with aspirin 81 mg and Plavix 75 mg x 90 days which will be completed in the next 2 weeks.    Patient was found to have elevated blood pressure today which was 164/60. BP was later rechecked and was 144/60.  She is to recheck her blood pressure at home.  If her blood pressure remains elevated, she is to reach out to her primary care provider for evaluation.    Patient is to continue to work to all of her comorbid conditions as managed by PCP and other specialists including her cardiologist as appropriate.    RECOMMENDATIONS:  - BP goal is less than 140/90  - Goal HbA1c is less than 7.   - LDL less than 70     Stroke/TIA education was provided today in regards to risk factors for stroke and lifestyle modifications to help minimize the risk of future stroke.    This included medication compliance, regular follow up with primary care physician,  and healthy lifestyle habits (nutrition/exercise).    2. Insomnia, unspecified type  Assessment & Plan:   Improved with adjustment in setting on CPAP machine.     She is to continue follow-up with pulmonology as appropriate.    3. Visual disturbance  Assessment & Plan:  Chronic in nature.    She denied any worsening in symptoms.    Patient has been experiencing visual disturbances since 2009  Mitral Valve: Mild regurgitation.    Interatrial Septum: No obvious intracardiac shunt with agitated saline injection.    Signed by: Brent Hawkins MD on 6/21/2023  6:10 PM       Lab Results   Component Value Date    CHOL 193 06/22/2023    TRIG 67 06/22/2023    HDL 57 06/22/2023    VLDL 13.4 06/22/2023    CHOLHDLRATIO 3.4 06/22/2023        Hemoglobin A1C   Date Value Ref Range Status   06/22/2023 5.7 (H) 4.0 - 5.6 % Final     Comment:     NEW METHOD  PLEASE NOTE NEW REFERENCE RANGE  (NOTE)  HbA1C Interpretive Ranges  <5.7              Normal  5.7 - 6.4         Consider Prediabetes  >6.5              Consider Diabetes               Allergies   Allergen Reactions    Penicillins Swelling     Difficulty breatheing    Erythromycin Nausea And Vomiting        Current Outpatient Medications   Medication Sig    atorvastatin (LIPITOR) 20 MG tablet     aspirin 325 MG tablet Take 1 tablet by mouth daily (Patient taking differently: Take 81 mg by mouth daily)    clopidogrel (PLAVIX) 75 MG tablet Take 1 tablet by mouth daily    omeprazole (PRILOSEC) 40 MG delayed release capsule Take 1 capsule by mouth in the morning and 1 capsule in the evening. (Patient taking differently: Take 1 capsule by mouth daily)    polyethylene glycol (MIRALAX) 17 g PACK packet Take 17 g by mouth 2 times daily Indications: Constipation    hydrOXYzine HCl (ATARAX) 50 MG tablet Take 1 tablet by mouth nightly as needed    potassium chloride (K-TAB) 20 MEQ TBCR extended release tablet Take 1 tablet by mouth daily    Omega-3 Fatty Acids (FISH OIL) 1000 MG capsule Take 1 capsule by mouth daily    turmeric 500 MG CAPS Take 2 capsules by mouth daily    Psyllium (METAMUCIL PO) Take by mouth in the morning and at bedtime One tablespoon BID    amLODIPine (NORVASC) 5 MG tablet Take 1 tablet by mouth daily    cetirizine (ZYRTEC) 10 MG tablet Take 1 tablet by mouth daily as needed    Cholecalciferol 50 MCG (2000 UT) TABS Take 1 tablet by mouth daily    dicyclomine

## 2024-08-30 NOTE — PATIENT INSTRUCTIONS
As per our discussion,    Overall, you seemed stable without recurrence of TIA symptoms.    I will not make any changes on your plan of care at this time.  Continue to follow-up with cardiology as appropriate.    Continue on atorvastatin 20 mg daily.    Continue to work to all of your comorbid conditions as managed by PCP and other specialists as appropriate    RECOMMENDATIONS:  - BP goal is less than 140/90  - Goal HbA1c is less than 7.   - LDL less than 70      I provided TIA education today in regards to risk factors for stroke and lifestyle modifications to help minimize the risk of future stroke.  This included medication compliance, regular follow up with primary care physician,  and healthy lifestyle habits (nutrition/exercise).    It was a pleasure to see you today    With everything been managed by cardiology and primary care provider, we will see you back on an as-needed basis.    Please do not hesitate to reach out for any questions or concerns for

## 2024-08-30 NOTE — ASSESSMENT & PLAN NOTE
Stable without recurrence of TIA symptoms    Patient is to continue on DAPT therapy with aspirin 81 mg and Plavix 75 mg x 90 days which will be completed in the next 2 weeks.    Patient was found to have elevated blood pressure today which was 164/60. BP was later rechecked and was 144/60.  She is to recheck her blood pressure at home.  If her blood pressure remains elevated, she is to reach out to her primary care provider for evaluation.    Patient is to continue to work to all of her comorbid conditions as managed by PCP and other specialists including her cardiologist as appropriate.    RECOMMENDATIONS:  - BP goal is less than 140/90  - Goal HbA1c is less than 7.   - LDL less than 70     Stroke/TIA education was provided today in regards to risk factors for stroke and lifestyle modifications to help minimize the risk of future stroke.    This included medication compliance, regular follow up with primary care physician,  and healthy lifestyle habits (nutrition/exercise).

## 2024-08-30 NOTE — ASSESSMENT & PLAN NOTE
Improved with adjustment in setting on CPAP machine.     She is to continue follow-up with pulmonology as appropriate.

## 2024-08-30 NOTE — ASSESSMENT & PLAN NOTE
Chronic in nature.    She denied any worsening in symptoms.    Patient has been experiencing visual disturbances since 2009 after aortic valve replacement and was told may be related due to arterial spasms.  She was placed on amlodipine 5 mg which was helping and later seems to be ineffective.      Patient is to continue to follow-up with ophthalmology as appropriate.

## 2024-11-26 NOTE — PROGRESS NOTES
Discharge instructions reviewed with patient and Sister. Allowed adequate time to ask questions, all questions answered. Printed copy of AVS given to patient. All belongings gathered, IV and tele discontinued. Transported via wheelchair to main entrance and into care of family. Resident

## 2025-06-27 ENCOUNTER — APPOINTMENT (OUTPATIENT)
Facility: HOSPITAL | Age: 80
DRG: 267 | End: 2025-06-27
Payer: MEDICARE

## 2025-06-27 ENCOUNTER — HOSPITAL ENCOUNTER (INPATIENT)
Facility: HOSPITAL | Age: 80
LOS: 13 days | Discharge: HOME | DRG: 267 | End: 2025-07-10
Attending: STUDENT IN AN ORGANIZED HEALTH CARE EDUCATION/TRAINING PROGRAM | Admitting: STUDENT IN AN ORGANIZED HEALTH CARE EDUCATION/TRAINING PROGRAM
Payer: MEDICARE

## 2025-06-27 DIAGNOSIS — I35.0 SYMPTOMATIC SEVERE AORTIC STENOSIS WITH NORMAL EJECTION FRACTION: ICD-10-CM

## 2025-06-27 DIAGNOSIS — I35.0 NONRHEUMATIC AORTIC VALVE STENOSIS: ICD-10-CM

## 2025-06-27 DIAGNOSIS — I35.0 AORTIC VALVE STENOSIS, ETIOLOGY OF CARDIAC VALVE DISEASE UNSPECIFIED: ICD-10-CM

## 2025-06-27 DIAGNOSIS — Z95.2 S/P TAVR (TRANSCATHETER AORTIC VALVE REPLACEMENT): ICD-10-CM

## 2025-06-27 DIAGNOSIS — I35.0 AORTIC STENOSIS: ICD-10-CM

## 2025-06-27 DIAGNOSIS — R07.9 ACUTE CHEST PAIN: Primary | ICD-10-CM

## 2025-06-27 DIAGNOSIS — I50.9 HEART FAILURE (HCC): ICD-10-CM

## 2025-06-27 DIAGNOSIS — I48.91 ATRIAL FIBRILLATION, UNSPECIFIED TYPE (HCC): ICD-10-CM

## 2025-06-27 DIAGNOSIS — R06.09 DOE (DYSPNEA ON EXERTION): ICD-10-CM

## 2025-06-27 DIAGNOSIS — R07.9 CHEST PAIN, UNSPECIFIED TYPE: ICD-10-CM

## 2025-06-27 LAB
ALBUMIN SERPL-MCNC: 4.2 G/DL (ref 3.5–5)
ALBUMIN/GLOB SERPL: 1.1 (ref 1.1–2.2)
ALP SERPL-CCNC: 126 U/L (ref 45–117)
ALT SERPL-CCNC: 32 U/L (ref 12–78)
ANION GAP SERPL CALC-SCNC: 7 MMOL/L (ref 2–12)
AST SERPL-CCNC: 26 U/L (ref 15–37)
BASOPHILS # BLD: 0.02 K/UL (ref 0–0.1)
BASOPHILS NFR BLD: 0.2 % (ref 0–1)
BILIRUB SERPL-MCNC: 0.8 MG/DL (ref 0.2–1)
BUN SERPL-MCNC: 21 MG/DL (ref 6–20)
BUN/CREAT SERPL: 16 (ref 12–20)
CALCIUM SERPL-MCNC: 9.4 MG/DL (ref 8.5–10.1)
CHLORIDE SERPL-SCNC: 98 MMOL/L (ref 97–108)
CO2 SERPL-SCNC: 30 MMOL/L (ref 21–32)
CREAT SERPL-MCNC: 1.32 MG/DL (ref 0.55–1.02)
DIFFERENTIAL METHOD BLD: ABNORMAL
EOSINOPHIL # BLD: 0.21 K/UL (ref 0–0.4)
EOSINOPHIL NFR BLD: 1.8 % (ref 0–7)
ERYTHROCYTE [DISTWIDTH] IN BLOOD BY AUTOMATED COUNT: 14 % (ref 11.5–14.5)
GLOBULIN SER CALC-MCNC: 3.8 G/DL (ref 2–4)
GLUCOSE SERPL-MCNC: 113 MG/DL (ref 65–100)
HCT VFR BLD AUTO: 40.6 % (ref 35–47)
HGB BLD-MCNC: 13.4 G/DL (ref 11.5–16)
IMM GRANULOCYTES # BLD AUTO: 0.05 K/UL (ref 0–0.04)
IMM GRANULOCYTES NFR BLD AUTO: 0.4 % (ref 0–0.5)
LIPASE SERPL-CCNC: 22 U/L (ref 13–75)
LYMPHOCYTES # BLD: 1.59 K/UL (ref 0.8–3.5)
LYMPHOCYTES NFR BLD: 13.6 % (ref 12–49)
MCH RBC QN AUTO: 28.5 PG (ref 26–34)
MCHC RBC AUTO-ENTMCNC: 33 G/DL (ref 30–36.5)
MCV RBC AUTO: 86.2 FL (ref 80–99)
MONOCYTES # BLD: 0.87 K/UL (ref 0–1)
MONOCYTES NFR BLD: 7.5 % (ref 5–13)
NEUTS SEG # BLD: 8.91 K/UL (ref 1.8–8)
NEUTS SEG NFR BLD: 76.5 % (ref 32–75)
NRBC # BLD: 0 K/UL (ref 0–0.01)
NRBC BLD-RTO: 0 PER 100 WBC
NT PRO BNP: 1175 PG/ML
PLATELET # BLD AUTO: 183 K/UL (ref 150–400)
PMV BLD AUTO: 10.6 FL (ref 8.9–12.9)
POTASSIUM SERPL-SCNC: 3.4 MMOL/L (ref 3.5–5.1)
PROT SERPL-MCNC: 8 G/DL (ref 6.4–8.2)
RBC # BLD AUTO: 4.71 M/UL (ref 3.8–5.2)
SODIUM SERPL-SCNC: 135 MMOL/L (ref 136–145)
TROPONIN I SERPL HS-MCNC: 23 NG/L (ref 0–51)
TROPONIN I SERPL HS-MCNC: 34 NG/L (ref 0–51)
WBC # BLD AUTO: 11.7 K/UL (ref 3.6–11)

## 2025-06-27 PROCEDURE — 71046 X-RAY EXAM CHEST 2 VIEWS: CPT

## 2025-06-27 PROCEDURE — 85025 COMPLETE CBC W/AUTO DIFF WBC: CPT

## 2025-06-27 PROCEDURE — 93005 ELECTROCARDIOGRAM TRACING: CPT | Performed by: STUDENT IN AN ORGANIZED HEALTH CARE EDUCATION/TRAINING PROGRAM

## 2025-06-27 PROCEDURE — 1100000000 HC RM PRIVATE

## 2025-06-27 PROCEDURE — 84484 ASSAY OF TROPONIN QUANT: CPT

## 2025-06-27 PROCEDURE — 83880 ASSAY OF NATRIURETIC PEPTIDE: CPT

## 2025-06-27 PROCEDURE — 36415 COLL VENOUS BLD VENIPUNCTURE: CPT

## 2025-06-27 PROCEDURE — 99285 EMERGENCY DEPT VISIT HI MDM: CPT

## 2025-06-27 PROCEDURE — 80053 COMPREHEN METABOLIC PANEL: CPT

## 2025-06-27 PROCEDURE — 83690 ASSAY OF LIPASE: CPT

## 2025-06-27 RX ORDER — FLUTICASONE PROPIONATE 50 MCG
2 SPRAY, SUSPENSION (ML) NASAL DAILY PRN
Status: DISCONTINUED | OUTPATIENT
Start: 2025-06-27 | End: 2025-07-10 | Stop reason: HOSPADM

## 2025-06-27 RX ORDER — AMLODIPINE BESYLATE 5 MG/1
5 TABLET ORAL DAILY
Status: DISCONTINUED | OUTPATIENT
Start: 2025-06-28 | End: 2025-06-27

## 2025-06-27 RX ORDER — ASPIRIN 81 MG/1
81 TABLET, CHEWABLE ORAL DAILY
Status: DISCONTINUED | OUTPATIENT
Start: 2025-06-28 | End: 2025-07-10 | Stop reason: HOSPADM

## 2025-06-27 RX ORDER — LEVOTHYROXINE SODIUM 88 UG/1
88 TABLET ORAL
Status: DISCONTINUED | OUTPATIENT
Start: 2025-06-28 | End: 2025-07-10 | Stop reason: HOSPADM

## 2025-06-27 RX ORDER — ATORVASTATIN CALCIUM 20 MG/1
20 TABLET, FILM COATED ORAL NIGHTLY
Status: DISCONTINUED | OUTPATIENT
Start: 2025-06-27 | End: 2025-07-10 | Stop reason: HOSPADM

## 2025-06-27 RX ORDER — FUROSEMIDE 40 MG/1
40 TABLET ORAL DAILY
Status: DISCONTINUED | OUTPATIENT
Start: 2025-06-28 | End: 2025-07-10 | Stop reason: HOSPADM

## 2025-06-27 RX ORDER — ACETAMINOPHEN 650 MG/1
650 SUPPOSITORY RECTAL EVERY 6 HOURS PRN
Status: DISCONTINUED | OUTPATIENT
Start: 2025-06-27 | End: 2025-07-10 | Stop reason: HOSPADM

## 2025-06-27 RX ORDER — ENOXAPARIN SODIUM 100 MG/ML
30 INJECTION SUBCUTANEOUS DAILY
Status: DISCONTINUED | OUTPATIENT
Start: 2025-06-28 | End: 2025-06-28

## 2025-06-27 RX ORDER — ONDANSETRON 2 MG/ML
4 INJECTION INTRAMUSCULAR; INTRAVENOUS EVERY 6 HOURS PRN
Status: DISCONTINUED | OUTPATIENT
Start: 2025-06-27 | End: 2025-07-10 | Stop reason: HOSPADM

## 2025-06-27 RX ORDER — ASPIRIN 81 MG/1
324 TABLET, CHEWABLE ORAL ONCE
Status: DISCONTINUED | OUTPATIENT
Start: 2025-06-27 | End: 2025-06-27

## 2025-06-27 RX ORDER — SODIUM CHLORIDE 9 MG/ML
INJECTION, SOLUTION INTRAVENOUS PRN
Status: DISCONTINUED | OUTPATIENT
Start: 2025-06-27 | End: 2025-07-10 | Stop reason: HOSPADM

## 2025-06-27 RX ORDER — HYDROXYZINE HYDROCHLORIDE 50 MG/1
50 TABLET, FILM COATED ORAL NIGHTLY PRN
Status: DISCONTINUED | OUTPATIENT
Start: 2025-06-27 | End: 2025-07-10 | Stop reason: HOSPADM

## 2025-06-27 RX ORDER — VITAMIN B COMPLEX
2000 TABLET ORAL DAILY
Status: DISCONTINUED | OUTPATIENT
Start: 2025-06-28 | End: 2025-07-10 | Stop reason: HOSPADM

## 2025-06-27 RX ORDER — POTASSIUM CHLORIDE 1500 MG/1
20 TABLET, EXTENDED RELEASE ORAL ONCE
Status: COMPLETED | OUTPATIENT
Start: 2025-06-27 | End: 2025-06-28

## 2025-06-27 RX ORDER — AMLODIPINE BESYLATE 5 MG/1
5 TABLET ORAL DAILY
Status: DISCONTINUED | OUTPATIENT
Start: 2025-06-27 | End: 2025-07-10 | Stop reason: HOSPADM

## 2025-06-27 RX ORDER — DILTIAZEM HYDROCHLORIDE 120 MG/1
120 CAPSULE, COATED, EXTENDED RELEASE ORAL DAILY
Status: DISCONTINUED | OUTPATIENT
Start: 2025-06-28 | End: 2025-07-10 | Stop reason: HOSPADM

## 2025-06-27 RX ORDER — PANTOPRAZOLE SODIUM 40 MG/1
40 TABLET, DELAYED RELEASE ORAL
Status: DISCONTINUED | OUTPATIENT
Start: 2025-06-28 | End: 2025-07-10 | Stop reason: HOSPADM

## 2025-06-27 RX ORDER — SODIUM CHLORIDE 0.9 % (FLUSH) 0.9 %
5-40 SYRINGE (ML) INJECTION EVERY 12 HOURS SCHEDULED
Status: DISCONTINUED | OUTPATIENT
Start: 2025-06-27 | End: 2025-07-02

## 2025-06-27 RX ORDER — SODIUM CHLORIDE 0.9 % (FLUSH) 0.9 %
5-40 SYRINGE (ML) INJECTION PRN
Status: DISCONTINUED | OUTPATIENT
Start: 2025-06-27 | End: 2025-07-02

## 2025-06-27 RX ORDER — ACETAMINOPHEN 325 MG/1
650 TABLET ORAL EVERY 6 HOURS PRN
Status: DISCONTINUED | OUTPATIENT
Start: 2025-06-27 | End: 2025-07-02

## 2025-06-27 ASSESSMENT — PAIN - FUNCTIONAL ASSESSMENT: PAIN_FUNCTIONAL_ASSESSMENT: 0-10

## 2025-06-27 ASSESSMENT — PAIN DESCRIPTION - LOCATION: LOCATION: CHEST

## 2025-06-27 ASSESSMENT — LIFESTYLE VARIABLES
HOW MANY STANDARD DRINKS CONTAINING ALCOHOL DO YOU HAVE ON A TYPICAL DAY: PATIENT DOES NOT DRINK
HOW OFTEN DO YOU HAVE A DRINK CONTAINING ALCOHOL: NEVER

## 2025-06-27 ASSESSMENT — PAIN DESCRIPTION - DESCRIPTORS: DESCRIPTORS: TIGHTNESS

## 2025-06-27 ASSESSMENT — PAIN DESCRIPTION - ORIENTATION: ORIENTATION: LEFT

## 2025-06-27 ASSESSMENT — HEART SCORE: ECG: NON-SPECIFC REPOLARIZATION DISTURBANCE/LBTB/PM

## 2025-06-27 NOTE — ED PROVIDER NOTES
[facility] EMERGENCY DEPARTMENT  EMERGENCY DEPARTMENT ENCOUNTER       Pt Name: Roxanna Schuster  MRN: [id number]  Birthdate 1945  Date of evaluation: 6/27/2025  Provider: SYDNEY Ramos   PCP: Mitchell Gruber MD  Note Started: 7:12 PM EDT 6/27/25     CHIEF COMPLAINT       Chief Complaint   Patient presents with    Chest Pain     Patient via EMS from Cardiologist office x 2 days after water aerobics, patient reports left sided CP, patient had EKG at cardiology that showed ST Depression. MD Campoverde. EMS gave 324 ASA PTA. EMS EKG was AFIB, patient hx of AFIB        HISTORY OF PRESENT ILLNESS: 1 or more elements      History From: Patient  None     Roxanna Schuster is a 80 y.o. female with a history of progressive aortic stenosis (previous bioprosthetic valve in 2009, scheduled for TAVR on 7/11/2025), A-fib s/p watchman 3/2024), CHF, HTN, and additional history as noted pranav, who presents to the ED sent from cardiology office, [facility], /, for evaluation of chest pain, shortness of breath and generalized fatigue and malaise.  Patient states few days ago she was doing water aerobics and started to have chest pain and shortness of breath.  States she was at the cardiology office today who sent her here for further evaluation given she had some abnormal EKG changes.  (EKG noted from office there were some nonspecific repolarization abnormalities). Denies any increased edema. Denies fevers, abdominal pain, N/V/D.      Nursing Notes were all reviewed and agreed with or any disagreements were addressed in the HPI.     REVIEW OF SYSTEMS      Review of Systems   All other systems reviewed and are negative.       Positives and Pertinent negatives as per HPI.    PAST HISTORY     Past Medical History:  Past Medical History:   Diagnosis Date    Aortic stenosis     secondary to rheumatic fever as a child, s/p AVR with porcine valve 10/09    Arrhythmia 04/2021    afib/flutter    Arrhythmia 05/2020

## 2025-06-28 LAB
ALBUMIN SERPL-MCNC: 3.3 G/DL (ref 3.5–5)
ALBUMIN/GLOB SERPL: 1 (ref 1.1–2.2)
ALP SERPL-CCNC: 99 U/L (ref 45–117)
ALT SERPL-CCNC: 24 U/L (ref 12–78)
ANION GAP SERPL CALC-SCNC: 8 MMOL/L (ref 2–12)
AST SERPL-CCNC: 19 U/L (ref 15–37)
BASOPHILS # BLD: 0.01 K/UL (ref 0–0.1)
BASOPHILS NFR BLD: 0.1 % (ref 0–1)
BILIRUB SERPL-MCNC: 1 MG/DL (ref 0.2–1)
BUN SERPL-MCNC: 15 MG/DL (ref 6–20)
BUN/CREAT SERPL: 16 (ref 12–20)
CALCIUM SERPL-MCNC: 8.9 MG/DL (ref 8.5–10.1)
CHLORIDE SERPL-SCNC: 103 MMOL/L (ref 97–108)
CO2 SERPL-SCNC: 29 MMOL/L (ref 21–32)
CREAT SERPL-MCNC: 0.93 MG/DL (ref 0.55–1.02)
DIFFERENTIAL METHOD BLD: NORMAL
EOSINOPHIL # BLD: 0.09 K/UL (ref 0–0.4)
EOSINOPHIL NFR BLD: 1.2 % (ref 0–7)
ERYTHROCYTE [DISTWIDTH] IN BLOOD BY AUTOMATED COUNT: 13.9 % (ref 11.5–14.5)
GLOBULIN SER CALC-MCNC: 3.3 G/DL (ref 2–4)
GLUCOSE SERPL-MCNC: 112 MG/DL (ref 65–100)
HCT VFR BLD AUTO: 36.7 % (ref 35–47)
HGB BLD-MCNC: 12 G/DL (ref 11.5–16)
IMM GRANULOCYTES # BLD AUTO: 0.02 K/UL (ref 0–0.04)
IMM GRANULOCYTES NFR BLD AUTO: 0.3 % (ref 0–0.5)
LYMPHOCYTES # BLD: 1.27 K/UL (ref 0.8–3.5)
LYMPHOCYTES NFR BLD: 17.3 % (ref 12–49)
MAGNESIUM SERPL-MCNC: 2.2 MG/DL (ref 1.6–2.4)
MCH RBC QN AUTO: 28.6 PG (ref 26–34)
MCHC RBC AUTO-ENTMCNC: 32.7 G/DL (ref 30–36.5)
MCV RBC AUTO: 87.6 FL (ref 80–99)
MONOCYTES # BLD: 0.79 K/UL (ref 0–1)
MONOCYTES NFR BLD: 10.7 % (ref 5–13)
NEUTS SEG # BLD: 5.18 K/UL (ref 1.8–8)
NEUTS SEG NFR BLD: 70.4 % (ref 32–75)
NRBC # BLD: 0 K/UL (ref 0–0.01)
NRBC BLD-RTO: 0 PER 100 WBC
PLATELET # BLD AUTO: 169 K/UL (ref 150–400)
PMV BLD AUTO: 11.4 FL (ref 8.9–12.9)
POTASSIUM SERPL-SCNC: 3.5 MMOL/L (ref 3.5–5.1)
PROT SERPL-MCNC: 6.6 G/DL (ref 6.4–8.2)
RBC # BLD AUTO: 4.19 M/UL (ref 3.8–5.2)
SODIUM SERPL-SCNC: 140 MMOL/L (ref 136–145)
WBC # BLD AUTO: 7.4 K/UL (ref 3.6–11)

## 2025-06-28 PROCEDURE — 36415 COLL VENOUS BLD VENIPUNCTURE: CPT

## 2025-06-28 PROCEDURE — 80053 COMPREHEN METABOLIC PANEL: CPT

## 2025-06-28 PROCEDURE — 2500000003 HC RX 250 WO HCPCS: Performed by: STUDENT IN AN ORGANIZED HEALTH CARE EDUCATION/TRAINING PROGRAM

## 2025-06-28 PROCEDURE — 2700000000 HC OXYGEN THERAPY PER DAY

## 2025-06-28 PROCEDURE — 83735 ASSAY OF MAGNESIUM: CPT

## 2025-06-28 PROCEDURE — 6370000000 HC RX 637 (ALT 250 FOR IP): Performed by: STUDENT IN AN ORGANIZED HEALTH CARE EDUCATION/TRAINING PROGRAM

## 2025-06-28 PROCEDURE — 94760 N-INVAS EAR/PLS OXIMETRY 1: CPT

## 2025-06-28 PROCEDURE — 6360000002 HC RX W HCPCS: Performed by: STUDENT IN AN ORGANIZED HEALTH CARE EDUCATION/TRAINING PROGRAM

## 2025-06-28 PROCEDURE — 2060000000 HC ICU INTERMEDIATE R&B

## 2025-06-28 PROCEDURE — 85025 COMPLETE CBC W/AUTO DIFF WBC: CPT

## 2025-06-28 RX ORDER — ENOXAPARIN SODIUM 100 MG/ML
40 INJECTION SUBCUTANEOUS DAILY
Status: DISCONTINUED | OUTPATIENT
Start: 2025-06-29 | End: 2025-07-10

## 2025-06-28 RX ADMIN — FUROSEMIDE 40 MG: 40 TABLET ORAL at 08:54

## 2025-06-28 RX ADMIN — LEVOTHYROXINE SODIUM 88 MCG: 0.09 TABLET ORAL at 05:33

## 2025-06-28 RX ADMIN — AMLODIPINE BESYLATE 5 MG: 5 TABLET ORAL at 08:54

## 2025-06-28 RX ADMIN — ATORVASTATIN CALCIUM 20 MG: 20 TABLET, FILM COATED ORAL at 21:02

## 2025-06-28 RX ADMIN — ATORVASTATIN CALCIUM 20 MG: 20 TABLET, FILM COATED ORAL at 00:17

## 2025-06-28 RX ADMIN — SODIUM CHLORIDE, PRESERVATIVE FREE 10 ML: 5 INJECTION INTRAVENOUS at 08:55

## 2025-06-28 RX ADMIN — POTASSIUM CHLORIDE 20 MEQ: 1500 TABLET, EXTENDED RELEASE ORAL at 00:17

## 2025-06-28 RX ADMIN — HYDROXYZINE HYDROCHLORIDE 50 MG: 50 TABLET, FILM COATED ORAL at 00:59

## 2025-06-28 RX ADMIN — ASPIRIN 81 MG: 81 TABLET, CHEWABLE ORAL at 08:54

## 2025-06-28 RX ADMIN — Medication 2000 UNITS: at 08:54

## 2025-06-28 RX ADMIN — ENOXAPARIN SODIUM 30 MG: 100 INJECTION SUBCUTANEOUS at 08:54

## 2025-06-28 RX ADMIN — PANTOPRAZOLE SODIUM 40 MG: 40 TABLET, DELAYED RELEASE ORAL at 05:33

## 2025-06-28 RX ADMIN — SODIUM CHLORIDE, PRESERVATIVE FREE 10 ML: 5 INJECTION INTRAVENOUS at 21:18

## 2025-06-28 RX ADMIN — HYDROXYZINE HYDROCHLORIDE 50 MG: 50 TABLET, FILM COATED ORAL at 21:05

## 2025-06-28 RX ADMIN — DILTIAZEM HYDROCHLORIDE 120 MG: 120 CAPSULE, EXTENDED RELEASE ORAL at 08:54

## 2025-06-28 RX ADMIN — AMLODIPINE BESYLATE 5 MG: 5 TABLET ORAL at 00:15

## 2025-06-28 NOTE — ED NOTES
ED TO INPATIENT SBAR HANDOFF        Verbal report given to ALVA Peralta on Roxanna Schuster  being transferred to  for routine progression of patient care       Patient Name: Roxanna Schuster   Preferred Name: Roxanna  : 1945  80 y.o.   Family/Caregiver Present: no   Code Status Order: Full Code  PO Status: NPO:No  Telemetry Order: Yes  C-SSRS: Risk of Suicide: No Risk  Sitter no n/a  Restraints:       Information from the following report(s) Nurse Handoff Report, Index, ED Encounter Summary, ED SBAR, Adult Overview, MAR, Recent Results, and Neuro Assessment was reviewed with the receiving nurse.    Amy Fall Assessment:    Presents to emergency department  because of falls (Syncope, seizure, or loss of consciousness): No  Age > 70: Yes  Altered Mental Status, Intoxication with alcohol or substance confusion (Disorientation, impaired judgment, poor safety awaremess, or inability to follow instructions): No  Impaired Mobility: Ambulates or transfers with assistive devices or assistance; Unable to ambulate or transer.: Yes  Nursing Judgement: Yes          Situation  Chief Complaint   Patient presents with    Chest Pain     Patient via EMS from Cardiologist office x 2 days after water aerobics, patient reports left sided CP, patient had EKG at cardiology that showed ST Depression. MD Campoverde. EMS gave 324 ASA PTA. EMS EKG was AFIB, patient hx of AFIB     Brief Description of Patient's Condition: n/a  Mental Status: oriented, alert, coherent, logical, thought processes intact, and able to concentrate and follow conversation  Arrived from:Cardiology Office  Imaging:   XR CHEST (2 VW)   Final Result   No acute cardiopulmonary disease.         Electronically signed by Jad Medina MD        Abnormal labs:   Abnormal Labs Reviewed   CBC WITH AUTO DIFFERENTIAL - Abnormal; Notable for the following components:       Result Value    WBC 11.7 (*)     Neutrophils % 76.5 (*)     Neutrophils Absolute 8.91 (*)     Immature

## 2025-06-28 NOTE — H&P
Hospitalist Admission Note    NAME:Roxanna Schuster   : 1945   MRN: [id number]     Date/Time: 2025 10:44 PM    Patient PCP: Mitchell Gruber MD    *Please be aware this note is formulated with assistance from Dragon voice-recognition dictation software. Please excuse any errors that may be present*    ______________________________________________________________________  Given the patient's current clinical presentation, I have a high level of concern for decompensation if discharged from the emergency department.  Complex decision making was performed, which includes reviewing the patient's available past medical records, laboratory results, and x-ray films.       My assessment of this patient's clinical condition and my plan of care is as follows.    Problem List:  Patient Active Problem List   Diagnosis    DJD (degenerative joint disease) of knee    Atrial fibrillation ([facility])    Total knee replacement status    Acute CVA (cerebrovascular accident) ([facility])    Insomnia    TIA (transient ischemic attack)    Hypertension    Visual disturbance    Left atrial flutter by electrocardiogram ([facility])    Acute gastrointestinal bleeding    GI bleed    Paroxysmal atrial fibrillation ([facility])    Symptomatic severe aortic stenosis with normal ejection fraction         Assessment / Plan:    Bioprosthetic valve failure  Dyspnea on exertion, fatigue   Aortic valve stenosis s/p bioprosthetic valve ( - )   Increased gradients by echo after replacement, cath in  with lower gradient; now with severe gradient and has become increasingly symptomatic, worse over past week, concerning for valve failure   - Saw Cardiology today, noted EKG with ?new ST depressions, she currently feels tired but no chest pain  - Troponin negative x1, there is mechanical failure in lab currently, unable to run another troponin at this time   - Admit to medicine  - Continuous telemetry   - Will consult [facility] - appears plan was

## 2025-06-29 LAB
ANION GAP SERPL CALC-SCNC: 6 MMOL/L (ref 2–12)
BASOPHILS # BLD: 0.01 K/UL (ref 0–0.1)
BASOPHILS NFR BLD: 0.2 % (ref 0–1)
BUN SERPL-MCNC: 19 MG/DL (ref 6–20)
BUN/CREAT SERPL: 17 (ref 12–20)
CALCIUM SERPL-MCNC: 8.6 MG/DL (ref 8.5–10.1)
CHLORIDE SERPL-SCNC: 102 MMOL/L (ref 97–108)
CO2 SERPL-SCNC: 27 MMOL/L (ref 21–32)
CREAT SERPL-MCNC: 1.1 MG/DL (ref 0.55–1.02)
DIFFERENTIAL METHOD BLD: NORMAL
EKG DIAGNOSIS: NORMAL
EKG Q-T INTERVAL: 372 MS
EKG QRS DURATION: 94 MS
EKG QTC CALCULATION (BAZETT): 434 MS
EKG R AXIS: 79 DEGREES
EKG T AXIS: 65 DEGREES
EKG VENTRICULAR RATE: 82 BPM
EOSINOPHIL # BLD: 0.33 K/UL (ref 0–0.4)
EOSINOPHIL NFR BLD: 5.9 % (ref 0–7)
ERYTHROCYTE [DISTWIDTH] IN BLOOD BY AUTOMATED COUNT: 14 % (ref 11.5–14.5)
GLUCOSE SERPL-MCNC: 104 MG/DL (ref 65–100)
HCT VFR BLD AUTO: 35.4 % (ref 35–47)
HGB BLD-MCNC: 11.5 G/DL (ref 11.5–16)
IMM GRANULOCYTES # BLD AUTO: 0.01 K/UL (ref 0–0.04)
IMM GRANULOCYTES NFR BLD AUTO: 0.2 % (ref 0–0.5)
LYMPHOCYTES # BLD: 1.64 K/UL (ref 0.8–3.5)
LYMPHOCYTES NFR BLD: 29.3 % (ref 12–49)
MAGNESIUM SERPL-MCNC: 2.1 MG/DL (ref 1.6–2.4)
MCH RBC QN AUTO: 28.7 PG (ref 26–34)
MCHC RBC AUTO-ENTMCNC: 32.5 G/DL (ref 30–36.5)
MCV RBC AUTO: 88.3 FL (ref 80–99)
MONOCYTES # BLD: 0.68 K/UL (ref 0–1)
MONOCYTES NFR BLD: 12.1 % (ref 5–13)
NEUTS SEG # BLD: 2.93 K/UL (ref 1.8–8)
NEUTS SEG NFR BLD: 52.3 % (ref 32–75)
NRBC # BLD: 0 K/UL (ref 0–0.01)
NRBC BLD-RTO: 0 PER 100 WBC
PHOSPHATE SERPL-MCNC: 3.7 MG/DL (ref 2.6–4.7)
PLATELET # BLD AUTO: 159 K/UL (ref 150–400)
PMV BLD AUTO: 11.2 FL (ref 8.9–12.9)
POTASSIUM SERPL-SCNC: 3.6 MMOL/L (ref 3.5–5.1)
RBC # BLD AUTO: 4.01 M/UL (ref 3.8–5.2)
SODIUM SERPL-SCNC: 135 MMOL/L (ref 136–145)
WBC # BLD AUTO: 5.6 K/UL (ref 3.6–11)

## 2025-06-29 PROCEDURE — 80048 BASIC METABOLIC PNL TOTAL CA: CPT

## 2025-06-29 PROCEDURE — 6370000000 HC RX 637 (ALT 250 FOR IP): Performed by: STUDENT IN AN ORGANIZED HEALTH CARE EDUCATION/TRAINING PROGRAM

## 2025-06-29 PROCEDURE — 2700000000 HC OXYGEN THERAPY PER DAY

## 2025-06-29 PROCEDURE — 83735 ASSAY OF MAGNESIUM: CPT

## 2025-06-29 PROCEDURE — 84100 ASSAY OF PHOSPHORUS: CPT

## 2025-06-29 PROCEDURE — 2500000003 HC RX 250 WO HCPCS: Performed by: STUDENT IN AN ORGANIZED HEALTH CARE EDUCATION/TRAINING PROGRAM

## 2025-06-29 PROCEDURE — 6360000002 HC RX W HCPCS: Performed by: STUDENT IN AN ORGANIZED HEALTH CARE EDUCATION/TRAINING PROGRAM

## 2025-06-29 PROCEDURE — 85025 COMPLETE CBC W/AUTO DIFF WBC: CPT

## 2025-06-29 PROCEDURE — 36415 COLL VENOUS BLD VENIPUNCTURE: CPT

## 2025-06-29 PROCEDURE — 2060000000 HC ICU INTERMEDIATE R&B

## 2025-06-29 RX ADMIN — PANTOPRAZOLE SODIUM 40 MG: 40 TABLET, DELAYED RELEASE ORAL at 05:38

## 2025-06-29 RX ADMIN — SODIUM CHLORIDE, PRESERVATIVE FREE 10 ML: 5 INJECTION INTRAVENOUS at 21:15

## 2025-06-29 RX ADMIN — DILTIAZEM HYDROCHLORIDE 120 MG: 120 CAPSULE, EXTENDED RELEASE ORAL at 09:08

## 2025-06-29 RX ADMIN — LEVOTHYROXINE SODIUM 88 MCG: 0.09 TABLET ORAL at 05:38

## 2025-06-29 RX ADMIN — Medication 2000 UNITS: at 09:08

## 2025-06-29 RX ADMIN — ATORVASTATIN CALCIUM 20 MG: 20 TABLET, FILM COATED ORAL at 21:15

## 2025-06-29 RX ADMIN — FUROSEMIDE 40 MG: 40 TABLET ORAL at 09:08

## 2025-06-29 RX ADMIN — AMLODIPINE BESYLATE 5 MG: 5 TABLET ORAL at 09:08

## 2025-06-29 RX ADMIN — HYDROXYZINE HYDROCHLORIDE 50 MG: 50 TABLET, FILM COATED ORAL at 21:15

## 2025-06-29 RX ADMIN — SODIUM CHLORIDE, PRESERVATIVE FREE 10 ML: 5 INJECTION INTRAVENOUS at 09:11

## 2025-06-29 RX ADMIN — ENOXAPARIN SODIUM 40 MG: 100 INJECTION SUBCUTANEOUS at 09:08

## 2025-06-29 RX ADMIN — ASPIRIN 81 MG: 81 TABLET, CHEWABLE ORAL at 09:08

## 2025-06-30 LAB
ANION GAP SERPL CALC-SCNC: 7 MMOL/L (ref 2–12)
BASOPHILS # BLD: 0.01 K/UL (ref 0–0.1)
BASOPHILS NFR BLD: 0.2 % (ref 0–1)
BUN SERPL-MCNC: 18 MG/DL (ref 6–20)
BUN/CREAT SERPL: 20 (ref 12–20)
CALCIUM SERPL-MCNC: 8.8 MG/DL (ref 8.5–10.1)
CHLORIDE SERPL-SCNC: 103 MMOL/L (ref 97–108)
CO2 SERPL-SCNC: 27 MMOL/L (ref 21–32)
CREAT SERPL-MCNC: 0.92 MG/DL (ref 0.55–1.02)
DIFFERENTIAL METHOD BLD: NORMAL
EOSINOPHIL # BLD: 0.36 K/UL (ref 0–0.4)
EOSINOPHIL NFR BLD: 6.1 % (ref 0–7)
ERYTHROCYTE [DISTWIDTH] IN BLOOD BY AUTOMATED COUNT: 13.9 % (ref 11.5–14.5)
GLUCOSE SERPL-MCNC: 96 MG/DL (ref 65–100)
HCT VFR BLD AUTO: 36.6 % (ref 35–47)
HGB BLD-MCNC: 11.8 G/DL (ref 11.5–16)
IMM GRANULOCYTES # BLD AUTO: 0.03 K/UL (ref 0–0.04)
IMM GRANULOCYTES NFR BLD AUTO: 0.5 % (ref 0–0.5)
LYMPHOCYTES # BLD: 1.43 K/UL (ref 0.8–3.5)
LYMPHOCYTES NFR BLD: 24.2 % (ref 12–49)
MAGNESIUM SERPL-MCNC: 2 MG/DL (ref 1.6–2.4)
MCH RBC QN AUTO: 28.5 PG (ref 26–34)
MCHC RBC AUTO-ENTMCNC: 32.2 G/DL (ref 30–36.5)
MCV RBC AUTO: 88.4 FL (ref 80–99)
MONOCYTES # BLD: 0.77 K/UL (ref 0–1)
MONOCYTES NFR BLD: 13 % (ref 5–13)
NEUTS SEG # BLD: 3.32 K/UL (ref 1.8–8)
NEUTS SEG NFR BLD: 56 % (ref 32–75)
NRBC # BLD: 0 K/UL (ref 0–0.01)
NRBC BLD-RTO: 0 PER 100 WBC
PHOSPHATE SERPL-MCNC: 4.3 MG/DL (ref 2.6–4.7)
PLATELET # BLD AUTO: 183 K/UL (ref 150–400)
PMV BLD AUTO: 10.5 FL (ref 8.9–12.9)
POTASSIUM SERPL-SCNC: 3.7 MMOL/L (ref 3.5–5.1)
RBC # BLD AUTO: 4.14 M/UL (ref 3.8–5.2)
SODIUM SERPL-SCNC: 137 MMOL/L (ref 136–145)
WBC # BLD AUTO: 5.9 K/UL (ref 3.6–11)

## 2025-06-30 PROCEDURE — 84100 ASSAY OF PHOSPHORUS: CPT

## 2025-06-30 PROCEDURE — 2700000000 HC OXYGEN THERAPY PER DAY

## 2025-06-30 PROCEDURE — 6370000000 HC RX 637 (ALT 250 FOR IP): Performed by: STUDENT IN AN ORGANIZED HEALTH CARE EDUCATION/TRAINING PROGRAM

## 2025-06-30 PROCEDURE — 85025 COMPLETE CBC W/AUTO DIFF WBC: CPT

## 2025-06-30 PROCEDURE — 80048 BASIC METABOLIC PNL TOTAL CA: CPT

## 2025-06-30 PROCEDURE — 36415 COLL VENOUS BLD VENIPUNCTURE: CPT

## 2025-06-30 PROCEDURE — 2060000000 HC ICU INTERMEDIATE R&B

## 2025-06-30 PROCEDURE — 6360000002 HC RX W HCPCS: Performed by: STUDENT IN AN ORGANIZED HEALTH CARE EDUCATION/TRAINING PROGRAM

## 2025-06-30 PROCEDURE — 83735 ASSAY OF MAGNESIUM: CPT

## 2025-06-30 PROCEDURE — 2500000003 HC RX 250 WO HCPCS: Performed by: STUDENT IN AN ORGANIZED HEALTH CARE EDUCATION/TRAINING PROGRAM

## 2025-06-30 RX ORDER — BENZOCAINE/MENTHOL 6 MG-10 MG
LOZENGE MUCOUS MEMBRANE 2 TIMES DAILY PRN
Status: DISCONTINUED | OUTPATIENT
Start: 2025-06-30 | End: 2025-07-10 | Stop reason: HOSPADM

## 2025-06-30 RX ADMIN — Medication 2000 UNITS: at 09:12

## 2025-06-30 RX ADMIN — PANTOPRAZOLE SODIUM 40 MG: 40 TABLET, DELAYED RELEASE ORAL at 06:04

## 2025-06-30 RX ADMIN — FUROSEMIDE 40 MG: 40 TABLET ORAL at 09:12

## 2025-06-30 RX ADMIN — ASPIRIN 81 MG: 81 TABLET, CHEWABLE ORAL at 09:11

## 2025-06-30 RX ADMIN — SODIUM CHLORIDE, PRESERVATIVE FREE 10 ML: 5 INJECTION INTRAVENOUS at 20:27

## 2025-06-30 RX ADMIN — ENOXAPARIN SODIUM 40 MG: 100 INJECTION SUBCUTANEOUS at 09:12

## 2025-06-30 RX ADMIN — AMLODIPINE BESYLATE 5 MG: 5 TABLET ORAL at 09:12

## 2025-06-30 RX ADMIN — HYDROXYZINE HYDROCHLORIDE 50 MG: 50 TABLET, FILM COATED ORAL at 20:27

## 2025-06-30 RX ADMIN — HYDROCORTISONE: 1 CREAM TOPICAL at 20:28

## 2025-06-30 RX ADMIN — ATORVASTATIN CALCIUM 20 MG: 20 TABLET, FILM COATED ORAL at 20:27

## 2025-06-30 RX ADMIN — DILTIAZEM HYDROCHLORIDE 120 MG: 120 CAPSULE, EXTENDED RELEASE ORAL at 09:12

## 2025-06-30 RX ADMIN — LEVOTHYROXINE SODIUM 88 MCG: 0.09 TABLET ORAL at 06:04

## 2025-06-30 RX ADMIN — SODIUM CHLORIDE, PRESERVATIVE FREE 10 ML: 5 INJECTION INTRAVENOUS at 09:13

## 2025-06-30 NOTE — CARE COORDINATION
Care Management Initial Assessment       RUR:10%  Readmission? No  1st IM letter given? Yes -     1st  letter given: No     06/30/25 2127   Service Assessment   Patient Orientation Alert and Oriented   Cognition Alert   History Provided By Patient   Primary Caregiver Self   Accompanied By/Relationship none   Support Systems Children;Family Members   Patient's Healthcare Decision Maker is: Legal Next of Kin   PCP Verified by CM Yes   Last Visit to PCP Within last 3 months   Prior Functional Level Independent in ADLs/IADLs   Current Functional Level Independent in ADLs/IADLs   Can patient return to prior living arrangement Yes   Ability to make needs known: Good   Family able to assist with home care needs: Yes   Would you like for me to discuss the discharge plan with any other family members/significant others, and if so, who? Yes   Financial Resources Medicare   Social/Functional History   Lives With Alone   Type of Home House   Bathroom Toilet Standard   Bathroom Accessibility Accessible   Home Equipment None   Prior Level of Assist for ADLs Independent   Prior Level of Assist for Homemaking Independent   Ambulation Assistance Independent   Prior Level of Assist for Transfers Independent   Active  Yes   Mode of Transportation Car   Occupation Retired   Discharge Planning   Type of Residence House   Living Arrangements Alone   Current Services Prior To Admission None   Potential Assistance Needed Home Care   DME Ordered? No   Potential Assistance Purchasing Medications No   Patient expects to be discharged to: House   Services At/After Discharge   [place] Resource Information Provided? No     CM met with patient introduced self, explained role and offered assistance  Patient lives alone and is independent in her ADLs, drives and is retired  She expects to return home. She might need HH set up post operative  She would like to use [facility] if she has DC needs  She thinks she might have a TAVR

## 2025-06-30 NOTE — CARE COORDINATION
Advance Care Planning     General Advance Care Planning (ACP) Conversation    Date of Conversation: 6/30/2025  Conducted with: Patient with Decision Making Capacity  Other persons present: None    Healthcare Decision Maker:   Primary Decision Maker: Karen Gonzalez - Child - [phone]    Primary Decision Maker: Cherrie Mon - Child - [phone]       Content/Action Overview:  Has ACP document(s) on file - reflects the patient's care preferences  Reviewed DNR/DNI and patient elects Full Code (Attempt Resuscitation)        Length of Voluntary ACP Conversation in minutes:  <16 minutes (Non-Billable)    ENGLISH H HOSAY

## 2025-07-01 LAB
ANION GAP SERPL CALC-SCNC: 6 MMOL/L (ref 2–12)
BASOPHILS # BLD: 0.01 K/UL (ref 0–0.1)
BASOPHILS NFR BLD: 0.2 % (ref 0–1)
BUN SERPL-MCNC: 22 MG/DL (ref 6–20)
BUN/CREAT SERPL: 20 (ref 12–20)
CALCIUM SERPL-MCNC: 8.8 MG/DL (ref 8.5–10.1)
CHLORIDE SERPL-SCNC: 103 MMOL/L (ref 97–108)
CO2 SERPL-SCNC: 28 MMOL/L (ref 21–32)
CREAT SERPL-MCNC: 1.08 MG/DL (ref 0.55–1.02)
DIFFERENTIAL METHOD BLD: ABNORMAL
EOSINOPHIL # BLD: 0.32 K/UL (ref 0–0.4)
EOSINOPHIL NFR BLD: 6 % (ref 0–7)
ERYTHROCYTE [DISTWIDTH] IN BLOOD BY AUTOMATED COUNT: 13.7 % (ref 11.5–14.5)
GLUCOSE BLD STRIP.AUTO-MCNC: 110 MG/DL (ref 65–117)
GLUCOSE SERPL-MCNC: 111 MG/DL (ref 65–100)
HCT VFR BLD AUTO: 37.5 % (ref 35–47)
HGB BLD-MCNC: 12.1 G/DL (ref 11.5–16)
IMM GRANULOCYTES # BLD AUTO: 0.02 K/UL (ref 0–0.04)
IMM GRANULOCYTES NFR BLD AUTO: 0.4 % (ref 0–0.5)
LYMPHOCYTES # BLD: 1.52 K/UL (ref 0.8–3.5)
LYMPHOCYTES NFR BLD: 28.6 % (ref 12–49)
MAGNESIUM SERPL-MCNC: 1.9 MG/DL (ref 1.6–2.4)
MCH RBC QN AUTO: 28.2 PG (ref 26–34)
MCHC RBC AUTO-ENTMCNC: 32.3 G/DL (ref 30–36.5)
MCV RBC AUTO: 87.4 FL (ref 80–99)
MONOCYTES # BLD: 0.71 K/UL (ref 0–1)
MONOCYTES NFR BLD: 13.3 % (ref 5–13)
NEUTS SEG # BLD: 2.74 K/UL (ref 1.8–8)
NEUTS SEG NFR BLD: 51.5 % (ref 32–75)
NRBC # BLD: 0 K/UL (ref 0–0.01)
NRBC BLD-RTO: 0 PER 100 WBC
PHOSPHATE SERPL-MCNC: 4.3 MG/DL (ref 2.6–4.7)
PLATELET # BLD AUTO: 191 K/UL (ref 150–400)
PMV BLD AUTO: 10.6 FL (ref 8.9–12.9)
POTASSIUM SERPL-SCNC: 3.7 MMOL/L (ref 3.5–5.1)
RBC # BLD AUTO: 4.29 M/UL (ref 3.8–5.2)
SERVICE CMNT-IMP: NORMAL
SODIUM SERPL-SCNC: 137 MMOL/L (ref 136–145)
WBC # BLD AUTO: 5.3 K/UL (ref 3.6–11)

## 2025-07-01 PROCEDURE — 83735 ASSAY OF MAGNESIUM: CPT

## 2025-07-01 PROCEDURE — 82962 GLUCOSE BLOOD TEST: CPT

## 2025-07-01 PROCEDURE — 80048 BASIC METABOLIC PNL TOTAL CA: CPT

## 2025-07-01 PROCEDURE — 85025 COMPLETE CBC W/AUTO DIFF WBC: CPT

## 2025-07-01 PROCEDURE — 6370000000 HC RX 637 (ALT 250 FOR IP): Performed by: STUDENT IN AN ORGANIZED HEALTH CARE EDUCATION/TRAINING PROGRAM

## 2025-07-01 PROCEDURE — 6360000002 HC RX W HCPCS: Performed by: STUDENT IN AN ORGANIZED HEALTH CARE EDUCATION/TRAINING PROGRAM

## 2025-07-01 PROCEDURE — 36415 COLL VENOUS BLD VENIPUNCTURE: CPT

## 2025-07-01 PROCEDURE — 2500000003 HC RX 250 WO HCPCS: Performed by: STUDENT IN AN ORGANIZED HEALTH CARE EDUCATION/TRAINING PROGRAM

## 2025-07-01 PROCEDURE — 84100 ASSAY OF PHOSPHORUS: CPT

## 2025-07-01 PROCEDURE — 2060000000 HC ICU INTERMEDIATE R&B

## 2025-07-01 PROCEDURE — 2700000000 HC OXYGEN THERAPY PER DAY

## 2025-07-01 RX ORDER — ASPIRIN 81 MG/1
81 TABLET ORAL DAILY
Status: ON HOLD | COMMUNITY
End: 2025-07-10 | Stop reason: HOSPADM

## 2025-07-01 RX ADMIN — ASPIRIN 81 MG: 81 TABLET, CHEWABLE ORAL at 08:59

## 2025-07-01 RX ADMIN — ENOXAPARIN SODIUM 40 MG: 100 INJECTION SUBCUTANEOUS at 09:00

## 2025-07-01 RX ADMIN — LEVOTHYROXINE SODIUM 88 MCG: 0.09 TABLET ORAL at 06:30

## 2025-07-01 RX ADMIN — AMLODIPINE BESYLATE 5 MG: 5 TABLET ORAL at 08:59

## 2025-07-01 RX ADMIN — SODIUM CHLORIDE, PRESERVATIVE FREE 10 ML: 5 INJECTION INTRAVENOUS at 09:00

## 2025-07-01 RX ADMIN — SODIUM CHLORIDE, PRESERVATIVE FREE 10 ML: 5 INJECTION INTRAVENOUS at 20:49

## 2025-07-01 RX ADMIN — HYDROXYZINE HYDROCHLORIDE 50 MG: 50 TABLET, FILM COATED ORAL at 20:49

## 2025-07-01 RX ADMIN — FUROSEMIDE 40 MG: 40 TABLET ORAL at 08:59

## 2025-07-01 RX ADMIN — ATORVASTATIN CALCIUM 20 MG: 20 TABLET, FILM COATED ORAL at 20:51

## 2025-07-01 RX ADMIN — Medication 2000 UNITS: at 08:59

## 2025-07-01 RX ADMIN — PANTOPRAZOLE SODIUM 40 MG: 40 TABLET, DELAYED RELEASE ORAL at 06:30

## 2025-07-01 RX ADMIN — DILTIAZEM HYDROCHLORIDE 120 MG: 120 CAPSULE, EXTENDED RELEASE ORAL at 08:59

## 2025-07-01 NOTE — CARE COORDINATION
CM Note:  Patient still in process for being worked up for TAVR  CM will continue to follow for DC needs.  She was independent PTA  English Daphne CALLE CM [phone]

## 2025-07-02 ENCOUNTER — APPOINTMENT (OUTPATIENT)
Facility: HOSPITAL | Age: 80
DRG: 267 | End: 2025-07-02
Payer: MEDICARE

## 2025-07-02 LAB
ANION GAP SERPL CALC-SCNC: 4 MMOL/L (ref 2–12)
BUN SERPL-MCNC: 16 MG/DL (ref 6–20)
BUN/CREAT SERPL: 24 (ref 12–20)
CALCIUM SERPL-MCNC: 7 MG/DL (ref 8.5–10.1)
CHLORIDE SERPL-SCNC: 112 MMOL/L (ref 97–108)
CO2 SERPL-SCNC: 21 MMOL/L (ref 21–32)
CREAT SERPL-MCNC: 0.68 MG/DL (ref 0.55–1.02)
GLUCOSE SERPL-MCNC: 86 MG/DL (ref 65–100)
POTASSIUM SERPL-SCNC: 4.3 MMOL/L (ref 3.5–5.1)
SODIUM SERPL-SCNC: 137 MMOL/L (ref 136–145)

## 2025-07-02 PROCEDURE — 99153 MOD SED SAME PHYS/QHP EA: CPT | Performed by: INTERNAL MEDICINE

## 2025-07-02 PROCEDURE — 2060000000 HC ICU INTERMEDIATE R&B

## 2025-07-02 PROCEDURE — 6360000004 HC RX CONTRAST MEDICATION

## 2025-07-02 PROCEDURE — C1725 CATH, TRANSLUMIN NON-LASER: HCPCS | Performed by: INTERNAL MEDICINE

## 2025-07-02 PROCEDURE — 36415 COLL VENOUS BLD VENIPUNCTURE: CPT

## 2025-07-02 PROCEDURE — 99152 MOD SED SAME PHYS/QHP 5/>YRS: CPT | Performed by: INTERNAL MEDICINE

## 2025-07-02 PROCEDURE — 1100000003 HC PRIVATE W/ TELEMETRY

## 2025-07-02 PROCEDURE — 75572 CT HRT W/3D IMAGE: CPT

## 2025-07-02 PROCEDURE — 80048 BASIC METABOLIC PNL TOTAL CA: CPT

## 2025-07-02 PROCEDURE — 2709999900 HC NON-CHARGEABLE SUPPLY: Performed by: INTERNAL MEDICINE

## 2025-07-02 PROCEDURE — 99223 1ST HOSP IP/OBS HIGH 75: CPT

## 2025-07-02 PROCEDURE — 93453 R&L HRT CATH W/VENTRICLGRPHY: CPT | Performed by: INTERNAL MEDICINE

## 2025-07-02 PROCEDURE — 6360000004 HC RX CONTRAST MEDICATION: Performed by: INTERNAL MEDICINE

## 2025-07-02 PROCEDURE — 76937 US GUIDE VASCULAR ACCESS: CPT | Performed by: INTERNAL MEDICINE

## 2025-07-02 PROCEDURE — 2500000003 HC RX 250 WO HCPCS: Performed by: STUDENT IN AN ORGANIZED HEALTH CARE EDUCATION/TRAINING PROGRAM

## 2025-07-02 PROCEDURE — 4A023N7 MEASUREMENT OF CARDIAC SAMPLING AND PRESSURE, LEFT HEART, PERCUTANEOUS APPROACH: ICD-10-PCS | Performed by: INTERNAL MEDICINE

## 2025-07-02 PROCEDURE — B2111ZZ FLUOROSCOPY OF MULTIPLE CORONARY ARTERIES USING LOW OSMOLAR CONTRAST: ICD-10-PCS | Performed by: INTERNAL MEDICINE

## 2025-07-02 PROCEDURE — C1769 GUIDE WIRE: HCPCS | Performed by: INTERNAL MEDICINE

## 2025-07-02 PROCEDURE — C1894 INTRO/SHEATH, NON-LASER: HCPCS | Performed by: INTERNAL MEDICINE

## 2025-07-02 PROCEDURE — 6370000000 HC RX 637 (ALT 250 FOR IP): Performed by: STUDENT IN AN ORGANIZED HEALTH CARE EDUCATION/TRAINING PROGRAM

## 2025-07-02 PROCEDURE — 2500000003 HC RX 250 WO HCPCS: Performed by: INTERNAL MEDICINE

## 2025-07-02 PROCEDURE — 6360000002 HC RX W HCPCS: Performed by: INTERNAL MEDICINE

## 2025-07-02 PROCEDURE — 2700000000 HC OXYGEN THERAPY PER DAY

## 2025-07-02 RX ORDER — FENTANYL CITRATE 50 UG/ML
INJECTION, SOLUTION INTRAMUSCULAR; INTRAVENOUS PRN
Status: DISCONTINUED | OUTPATIENT
Start: 2025-07-02 | End: 2025-07-02 | Stop reason: HOSPADM

## 2025-07-02 RX ORDER — SODIUM CHLORIDE 0.9 % (FLUSH) 0.9 %
5-40 SYRINGE (ML) INJECTION EVERY 12 HOURS SCHEDULED
Status: DISCONTINUED | OUTPATIENT
Start: 2025-07-02 | End: 2025-07-10 | Stop reason: HOSPADM

## 2025-07-02 RX ORDER — HEPARIN SODIUM 10000 [USP'U]/ML
INJECTION, SOLUTION INTRAVENOUS; SUBCUTANEOUS PRN
Status: DISCONTINUED | OUTPATIENT
Start: 2025-07-02 | End: 2025-07-02 | Stop reason: HOSPADM

## 2025-07-02 RX ORDER — SODIUM CHLORIDE 9 MG/ML
INJECTION, SOLUTION INTRAVENOUS PRN
Status: DISCONTINUED | OUTPATIENT
Start: 2025-07-02 | End: 2025-07-10 | Stop reason: HOSPADM

## 2025-07-02 RX ORDER — LIDOCAINE HYDROCHLORIDE 10 MG/ML
INJECTION, SOLUTION INFILTRATION; PERINEURAL PRN
Status: DISCONTINUED | OUTPATIENT
Start: 2025-07-02 | End: 2025-07-02 | Stop reason: HOSPADM

## 2025-07-02 RX ORDER — ACETAMINOPHEN 325 MG/1
650 TABLET ORAL EVERY 4 HOURS PRN
Status: DISCONTINUED | OUTPATIENT
Start: 2025-07-02 | End: 2025-07-10 | Stop reason: HOSPADM

## 2025-07-02 RX ORDER — IOPAMIDOL 755 MG/ML
100 INJECTION, SOLUTION INTRAVASCULAR
Status: COMPLETED | OUTPATIENT
Start: 2025-07-02 | End: 2025-07-02

## 2025-07-02 RX ORDER — POLYETHYLENE GLYCOL 3350 17 G/17G
17 POWDER, FOR SOLUTION ORAL 2 TIMES DAILY
Status: DISCONTINUED | OUTPATIENT
Start: 2025-07-02 | End: 2025-07-10 | Stop reason: HOSPADM

## 2025-07-02 RX ORDER — SODIUM CHLORIDE 0.9 % (FLUSH) 0.9 %
5-40 SYRINGE (ML) INJECTION PRN
Status: DISCONTINUED | OUTPATIENT
Start: 2025-07-02 | End: 2025-07-10 | Stop reason: HOSPADM

## 2025-07-02 RX ORDER — SODIUM CHLORIDE 9 MG/ML
INJECTION, SOLUTION INTRAVENOUS CONTINUOUS
Status: ACTIVE | OUTPATIENT
Start: 2025-07-02 | End: 2025-07-02

## 2025-07-02 RX ORDER — IOPAMIDOL 755 MG/ML
INJECTION, SOLUTION INTRAVASCULAR PRN
Status: DISCONTINUED | OUTPATIENT
Start: 2025-07-02 | End: 2025-07-02 | Stop reason: HOSPADM

## 2025-07-02 RX ADMIN — IOPAMIDOL 100 ML: 755 INJECTION, SOLUTION INTRAVENOUS at 10:38

## 2025-07-02 RX ADMIN — ATORVASTATIN CALCIUM 20 MG: 20 TABLET, FILM COATED ORAL at 21:05

## 2025-07-02 RX ADMIN — PANTOPRAZOLE SODIUM 40 MG: 40 TABLET, DELAYED RELEASE ORAL at 06:55

## 2025-07-02 RX ADMIN — HYDROXYZINE HYDROCHLORIDE 50 MG: 50 TABLET, FILM COATED ORAL at 21:05

## 2025-07-02 RX ADMIN — DILTIAZEM HYDROCHLORIDE 120 MG: 120 CAPSULE, EXTENDED RELEASE ORAL at 09:57

## 2025-07-02 RX ADMIN — FUROSEMIDE 40 MG: 40 TABLET ORAL at 09:57

## 2025-07-02 RX ADMIN — ASPIRIN 81 MG: 81 TABLET, CHEWABLE ORAL at 09:57

## 2025-07-02 RX ADMIN — SODIUM CHLORIDE, PRESERVATIVE FREE 10 ML: 5 INJECTION INTRAVENOUS at 09:57

## 2025-07-02 RX ADMIN — AMLODIPINE BESYLATE 5 MG: 5 TABLET ORAL at 09:57

## 2025-07-02 RX ADMIN — SODIUM CHLORIDE, PRESERVATIVE FREE 10 ML: 5 INJECTION INTRAVENOUS at 21:06

## 2025-07-02 RX ADMIN — Medication 2000 UNITS: at 09:57

## 2025-07-02 RX ADMIN — LEVOTHYROXINE SODIUM 88 MCG: 0.09 TABLET ORAL at 06:55

## 2025-07-03 ENCOUNTER — APPOINTMENT (OUTPATIENT)
Facility: HOSPITAL | Age: 80
DRG: 267 | End: 2025-07-03
Payer: MEDICARE

## 2025-07-03 PROBLEM — Z01.810 PREOP CARDIOVASCULAR EXAM: Status: ACTIVE | Noted: 2025-07-03

## 2025-07-03 PROBLEM — I65.23 BILATERAL CAROTID ARTERY STENOSIS: Status: ACTIVE | Noted: 2025-07-03

## 2025-07-03 LAB
ANION GAP SERPL CALC-SCNC: 8 MMOL/L (ref 2–12)
BUN SERPL-MCNC: 22 MG/DL (ref 6–20)
BUN/CREAT SERPL: 21 (ref 12–20)
CALCIUM SERPL-MCNC: 9.1 MG/DL (ref 8.5–10.1)
CHLORIDE SERPL-SCNC: 97 MMOL/L (ref 97–108)
CO2 SERPL-SCNC: 31 MMOL/L (ref 21–32)
CREAT SERPL-MCNC: 1.06 MG/DL (ref 0.55–1.02)
ECHO BSA: 1.71 M2
ECHO BSA: 1.71 M2
GLUCOSE SERPL-MCNC: 108 MG/DL (ref 65–100)
POTASSIUM SERPL-SCNC: 3.6 MMOL/L (ref 3.5–5.1)
SODIUM SERPL-SCNC: 136 MMOL/L (ref 136–145)
VAS LEFT CCA DIST EDV: 9.1 CM/S
VAS LEFT CCA DIST PSV: 36.5 CM/S
VAS LEFT CCA PROX EDV: 9.8 CM/S
VAS LEFT CCA PROX PSV: 36.8 CM/S
VAS LEFT ECA EDV: 0 CM/S
VAS LEFT ECA PSV: 50.6 CM/S
VAS LEFT ICA DIST EDV: 15 CM/S
VAS LEFT ICA DIST PSV: 48.1 CM/S
VAS LEFT ICA MID EDV: 14.7 CM/S
VAS LEFT ICA MID PSV: 45 CM/S
VAS LEFT ICA PROX EDV: 11 CM/S
VAS LEFT ICA PROX PSV: 44 CM/S
VAS LEFT ICA/CCA PSV: 1.3 NO UNITS
VAS LEFT SUBCLAVIAN PROX EDV: 0 CM/S
VAS LEFT SUBCLAVIAN PROX PSV: 62.5 CM/S
VAS LEFT VERTEBRAL EDV: 14.2 CM/S
VAS LEFT VERTEBRAL PSV: 35.5 CM/S
VAS RIGHT CCA DIST EDV: 12.2 CM/S
VAS RIGHT CCA DIST PSV: 33 CM/S
VAS RIGHT CCA PROX EDV: 5.6 CM/S
VAS RIGHT CCA PROX PSV: 48.3 CM/S
VAS RIGHT ECA EDV: 0 CM/S
VAS RIGHT ECA PSV: 58.1 CM/S
VAS RIGHT ICA DIST EDV: 11.3 CM/S
VAS RIGHT ICA DIST PSV: 46.2 CM/S
VAS RIGHT ICA MID EDV: 14.4 CM/S
VAS RIGHT ICA MID PSV: 58.8 CM/S
VAS RIGHT ICA PROX EDV: 14.7 CM/S
VAS RIGHT ICA PROX PSV: 52.5 CM/S
VAS RIGHT ICA/CCA PSV: 1.8 NO UNITS
VAS RIGHT SUBCLAVIAN PROX EDV: 5.3 CM/S
VAS RIGHT SUBCLAVIAN PROX PSV: 91.1 CM/S
VAS RIGHT VERTEBRAL EDV: 6.6 CM/S
VAS RIGHT VERTEBRAL PSV: 43.3 CM/S

## 2025-07-03 PROCEDURE — 2500000003 HC RX 250 WO HCPCS: Performed by: INTERNAL MEDICINE

## 2025-07-03 PROCEDURE — 1100000003 HC PRIVATE W/ TELEMETRY

## 2025-07-03 PROCEDURE — 80048 BASIC METABOLIC PNL TOTAL CA: CPT

## 2025-07-03 PROCEDURE — 93880 EXTRACRANIAL BILAT STUDY: CPT

## 2025-07-03 PROCEDURE — 6370000000 HC RX 637 (ALT 250 FOR IP): Performed by: STUDENT IN AN ORGANIZED HEALTH CARE EDUCATION/TRAINING PROGRAM

## 2025-07-03 PROCEDURE — 36415 COLL VENOUS BLD VENIPUNCTURE: CPT

## 2025-07-03 PROCEDURE — 93880 EXTRACRANIAL BILAT STUDY: CPT | Performed by: PSYCHIATRY & NEUROLOGY

## 2025-07-03 PROCEDURE — 2700000000 HC OXYGEN THERAPY PER DAY

## 2025-07-03 PROCEDURE — 6370000000 HC RX 637 (ALT 250 FOR IP): Performed by: INTERNAL MEDICINE

## 2025-07-03 PROCEDURE — 6360000002 HC RX W HCPCS: Performed by: STUDENT IN AN ORGANIZED HEALTH CARE EDUCATION/TRAINING PROGRAM

## 2025-07-03 RX ADMIN — POLYETHYLENE GLYCOL 3350 17 G: 17 POWDER, FOR SOLUTION ORAL at 08:29

## 2025-07-03 RX ADMIN — ASPIRIN 81 MG: 81 TABLET, CHEWABLE ORAL at 08:28

## 2025-07-03 RX ADMIN — SODIUM CHLORIDE, PRESERVATIVE FREE 10 ML: 5 INJECTION INTRAVENOUS at 21:11

## 2025-07-03 RX ADMIN — PANTOPRAZOLE SODIUM 40 MG: 40 TABLET, DELAYED RELEASE ORAL at 06:07

## 2025-07-03 RX ADMIN — FUROSEMIDE 40 MG: 40 TABLET ORAL at 08:28

## 2025-07-03 RX ADMIN — HYDROXYZINE HYDROCHLORIDE 50 MG: 50 TABLET, FILM COATED ORAL at 21:12

## 2025-07-03 RX ADMIN — Medication 2000 UNITS: at 08:28

## 2025-07-03 RX ADMIN — AMLODIPINE BESYLATE 5 MG: 5 TABLET ORAL at 08:28

## 2025-07-03 RX ADMIN — HYDROCORTISONE: 1 CREAM TOPICAL at 21:13

## 2025-07-03 RX ADMIN — POLYETHYLENE GLYCOL 3350 17 G: 17 POWDER, FOR SOLUTION ORAL at 21:10

## 2025-07-03 RX ADMIN — ENOXAPARIN SODIUM 40 MG: 100 INJECTION SUBCUTANEOUS at 08:29

## 2025-07-03 RX ADMIN — DILTIAZEM HYDROCHLORIDE 120 MG: 120 CAPSULE, EXTENDED RELEASE ORAL at 08:28

## 2025-07-03 RX ADMIN — LEVOTHYROXINE SODIUM 88 MCG: 0.09 TABLET ORAL at 06:07

## 2025-07-03 RX ADMIN — ATORVASTATIN CALCIUM 20 MG: 20 TABLET, FILM COATED ORAL at 21:10

## 2025-07-03 NOTE — PROCEDURES
[facility]              [address] [place] [address]                              CARDIAC CATH      PATIENT NAME: ARIEL PARDO               : 1945  MED REC NO: [id number]                       ROOM: 2210  ACCOUNT NO: [id number]                       ADMIT DATE: 2025  PROVIDER: Ludin Pedro MD    DATE OF SERVICE:  2025    SURGEON:  Ludin Pedro MD    PROCEDURES:  Right heart catheterization, coronary angiography.    INDICATIONS:  Preoperative evaluation prior to TAVR.    TECHNIQUE:  Right femoral artery and right femoral vein via Wilma.    CATHETERS USED:  6-Burkinan pulmonary artery catheter, 6-Burkinan JL4, 6-Burkinan JR4.    MEDICATIONS USED:  Please see the separate cath lab log sheet.    ESTIMATED BLOOD LOSS:  Less than 20 mL.    SPECIMENS REMOVED:  None.    COMPLICATIONS:  None.    ANESTHESIA:  Local with conscious sedation.    FINDINGS:    1. Hemodynamics:  Mean right atrial pressure was 3.  Right ventricular pressure was 34/0 with an end-diastolic pressure of 3.  Pulmonary artery pressure was 36/15 with a mean of 26.  Mean pulmonary capillary wedge pressure was 11.  Aortic pressure was 155/57 with a mean of 89.  2. Oximetry data and cardiac outputs:  Aortic oxygen saturation was 92%.  Pulmonary artery oxygen saturation was 70%.  Brendan derived cardiac output was 4.1 L/minute with a cardiac index of 2.5 L/minute per m2.  3. Coronary angiography revealed a right-dominant system.  The left main was a moderate-sized vessel without significant disease.  The LAD was moderate in size and had mild irregularities.  There was one large branching diagonal vessel without significant disease.  There was 30% area of narrowing in the mid LAD.  No other significant lesions were seen.  There was a small ramus intermedius vessel.  The left circumflex, ramus intermedius vessel without significant disease.  The left circumflex was moderate in size.

## 2025-07-03 NOTE — CARE COORDINATION
Transition of Care Plan:    RUR: 11% \"low risk\"  Prior Level of Functioning: Independent with ADL's   Disposition: Home with family support   JOHN: 7/8/25  If SNF or IPR: Date FOC offered: N/A  Follow up appointments: PCP/Specialists as indicated  DME needed: None   Transportation at discharge: Family to transport   IM/IMM Medicare/ letter given: 2nd IM needed prior to d/c   Is patient a [place] and connected with [facility]? No  Caregiver Contact: Karen Durán (child), [phone]  Discharge Caregiver contacted prior to discharge? To be contacted  Care Conference needed? Not at this time   Barriers to discharge: CTS, TAVR     1428 PM: Chart reviewed. CM following for d/c planning. Pt to go for TAVR next week. CM to continue to follow.    THOMAS Yeager  Care Management  [facility]   [phone]

## 2025-07-04 LAB
ANION GAP SERPL CALC-SCNC: 6 MMOL/L (ref 2–12)
BASOPHILS # BLD: 0.02 K/UL (ref 0–0.1)
BASOPHILS NFR BLD: 0.4 % (ref 0–1)
BUN SERPL-MCNC: 23 MG/DL (ref 6–20)
BUN/CREAT SERPL: 21 (ref 12–20)
CALCIUM SERPL-MCNC: 9 MG/DL (ref 8.5–10.1)
CHLORIDE SERPL-SCNC: 98 MMOL/L (ref 97–108)
CO2 SERPL-SCNC: 30 MMOL/L (ref 21–32)
CREAT SERPL-MCNC: 1.1 MG/DL (ref 0.55–1.02)
DIFFERENTIAL METHOD BLD: NORMAL
EOSINOPHIL # BLD: 0.35 K/UL (ref 0–0.4)
EOSINOPHIL NFR BLD: 6.5 % (ref 0–7)
ERYTHROCYTE [DISTWIDTH] IN BLOOD BY AUTOMATED COUNT: 13.5 % (ref 11.5–14.5)
GLUCOSE SERPL-MCNC: 108 MG/DL (ref 65–100)
HCT VFR BLD AUTO: 37.6 % (ref 35–47)
HGB BLD-MCNC: 12.3 G/DL (ref 11.5–16)
IMM GRANULOCYTES # BLD AUTO: 0.01 K/UL (ref 0–0.04)
IMM GRANULOCYTES NFR BLD AUTO: 0.2 % (ref 0–0.5)
LYMPHOCYTES # BLD: 1.62 K/UL (ref 0.8–3.5)
LYMPHOCYTES NFR BLD: 30.3 % (ref 12–49)
MCH RBC QN AUTO: 28.1 PG (ref 26–34)
MCHC RBC AUTO-ENTMCNC: 32.7 G/DL (ref 30–36.5)
MCV RBC AUTO: 86 FL (ref 80–99)
MONOCYTES # BLD: 0.68 K/UL (ref 0–1)
MONOCYTES NFR BLD: 12.7 % (ref 5–13)
NEUTS SEG # BLD: 2.67 K/UL (ref 1.8–8)
NEUTS SEG NFR BLD: 49.9 % (ref 32–75)
NRBC # BLD: 0 K/UL (ref 0–0.01)
NRBC BLD-RTO: 0 PER 100 WBC
PLATELET # BLD AUTO: 179 K/UL (ref 150–400)
PMV BLD AUTO: 10.4 FL (ref 8.9–12.9)
POTASSIUM SERPL-SCNC: 4.1 MMOL/L (ref 3.5–5.1)
RBC # BLD AUTO: 4.37 M/UL (ref 3.8–5.2)
SODIUM SERPL-SCNC: 134 MMOL/L (ref 136–145)
WBC # BLD AUTO: 5.4 K/UL (ref 3.6–11)

## 2025-07-04 PROCEDURE — 1100000003 HC PRIVATE W/ TELEMETRY

## 2025-07-04 PROCEDURE — 6360000002 HC RX W HCPCS: Performed by: STUDENT IN AN ORGANIZED HEALTH CARE EDUCATION/TRAINING PROGRAM

## 2025-07-04 PROCEDURE — 2700000000 HC OXYGEN THERAPY PER DAY

## 2025-07-04 PROCEDURE — 6370000000 HC RX 637 (ALT 250 FOR IP): Performed by: STUDENT IN AN ORGANIZED HEALTH CARE EDUCATION/TRAINING PROGRAM

## 2025-07-04 PROCEDURE — 94760 N-INVAS EAR/PLS OXIMETRY 1: CPT

## 2025-07-04 PROCEDURE — 80048 BASIC METABOLIC PNL TOTAL CA: CPT

## 2025-07-04 PROCEDURE — 94761 N-INVAS EAR/PLS OXIMETRY MLT: CPT

## 2025-07-04 PROCEDURE — 2500000003 HC RX 250 WO HCPCS: Performed by: INTERNAL MEDICINE

## 2025-07-04 PROCEDURE — 6370000000 HC RX 637 (ALT 250 FOR IP): Performed by: INTERNAL MEDICINE

## 2025-07-04 PROCEDURE — 36415 COLL VENOUS BLD VENIPUNCTURE: CPT

## 2025-07-04 PROCEDURE — 85025 COMPLETE CBC W/AUTO DIFF WBC: CPT

## 2025-07-04 RX ADMIN — FUROSEMIDE 40 MG: 40 TABLET ORAL at 08:47

## 2025-07-04 RX ADMIN — LEVOTHYROXINE SODIUM 88 MCG: 0.09 TABLET ORAL at 06:43

## 2025-07-04 RX ADMIN — POLYETHYLENE GLYCOL 3350 17 G: 17 POWDER, FOR SOLUTION ORAL at 22:14

## 2025-07-04 RX ADMIN — AMLODIPINE BESYLATE 5 MG: 5 TABLET ORAL at 08:47

## 2025-07-04 RX ADMIN — HYDROXYZINE HYDROCHLORIDE 50 MG: 50 TABLET, FILM COATED ORAL at 22:14

## 2025-07-04 RX ADMIN — POLYETHYLENE GLYCOL 3350 17 G: 17 POWDER, FOR SOLUTION ORAL at 06:48

## 2025-07-04 RX ADMIN — SODIUM CHLORIDE, PRESERVATIVE FREE 10 ML: 5 INJECTION INTRAVENOUS at 08:46

## 2025-07-04 RX ADMIN — ATORVASTATIN CALCIUM 20 MG: 20 TABLET, FILM COATED ORAL at 22:14

## 2025-07-04 RX ADMIN — Medication 2000 UNITS: at 08:47

## 2025-07-04 RX ADMIN — HYDROCORTISONE: 1 CREAM TOPICAL at 22:18

## 2025-07-04 RX ADMIN — ASPIRIN 81 MG: 81 TABLET, CHEWABLE ORAL at 08:47

## 2025-07-04 RX ADMIN — SODIUM CHLORIDE, PRESERVATIVE FREE 10 ML: 5 INJECTION INTRAVENOUS at 22:18

## 2025-07-04 RX ADMIN — DILTIAZEM HYDROCHLORIDE 120 MG: 120 CAPSULE, EXTENDED RELEASE ORAL at 08:47

## 2025-07-04 RX ADMIN — PANTOPRAZOLE SODIUM 40 MG: 40 TABLET, DELAYED RELEASE ORAL at 06:43

## 2025-07-04 RX ADMIN — ENOXAPARIN SODIUM 40 MG: 100 INJECTION SUBCUTANEOUS at 08:46

## 2025-07-04 NOTE — CONSULTS
[facility]              [address] [id number]                              CONSULTATION      PATIENT NAME: ARIEL PARDO               : 1945  MED REC NO: [id number]                       ROOM: 2152  ACCOUNT NO: [id number]                       ADMIT DATE: 2025  PROVIDER: Ludin Pedro MD    DATE OF SERVICE:  2025    ATTENDING PHYSICIAN:  David L Mendel, MD    REASON FOR CONSULTATION:  Evaluate chest discomfort.    HISTORY OF PRESENT ILLNESS:  The patient is an 80-year-old female followed by Dr. Hawkins and Dr. Zimmer.  She has a history of a prior bioprosthetic aortic valve replacement in  and in recent years has developed a significant prosthetic valve stenosis.  She has seen Dr. Zimmer for TAVR evaluation which is currently ongoing.  No history of coronary artery disease or prior coronary stents.  She has had atrial fibrillation and ablation procedures, 1 at least 2 occasions, most recently in .  She also had the Watchman procedure in 2024.  She has hypertension and has had diastolic heart failure.  An echocardiogram in 2023 showed normal EF.  This was done in the hospital.  She has had more recent office echocardiograms, which are not available at this time for review.    Earlier this week, the patient went to order aerobics.  She became very short of breath and developed chest tightness, which seem to persist.  She eventually came to our office yesterday because of ongoing chest tightness and dyspnea.  An EKG showed some ST depressions and she was told to go the [facility] ER.  In the emergency room, she underwent workup and 2 sets of high sensitivity troponins were negative.  She was admitted for further evaluation.  She is currently comfortable and hemodynamically stable.    PAST MEDICAL HISTORY:  As noted above.  DJD, gastroesophageal reflux, sleep apnea, diverticulosis, hypothyroidism, prior CVA, history 
Pt seen and examined     Full consult dictated    She apparently has severe stenosis of her bioprosthetc AVR and has become quite symptomatic    She has seen Dr Zimmer and is undergoing pre TAR W/U    Unfortunately he is off next week    Needs to stay in hospital and I will consult the TAVR team and review office records   
thyromegaly.  No scleral icterus, mucous membranes moist, conjuctivae pink, no xanthelasma.  Unlabored, clear to auscultation bilaterally, symmetric air movement.  Regular rate and irregular rhythm, +systolic murmur, no pericardial rub, knock, or gallop.  No JVD or peripheral edema.  No carotid bruit.  Palpable radial pulses bilaterally.  Abdomen, soft, nontender, nondistended.  No abdominal bruit or pulstatile masses.  Extremities without cyanosis or clubbing.  Muscle tone and bulk normal for age.  Skin warm and dry.  No rashes or ulcers.  Neuro grossly nonfocal.  No tremor.  Awake and appropriate.    CARDIOGRAPHICS and STUDIES, I reviewed:    Telemetry:  SINUS RHYTHM with PAC's, sinus slowing (junctional escape is rare).    ECG: Reviewed.  Study on admission does not show atrial fibrillation.      Labs:  No results for input(s): \"CPK\", \"CKMB\" in the last 72 hours.    Invalid input(s): \"CPKMB\", \"CKNDX\", \"TROIQ\"  Lab Results   Component Value Date/Time    CHOL 193 06/22/2023 03:46 AM    HDL 57 06/22/2023 03:46 AM    .6 06/22/2023 03:46 AM     No results for input(s): \"INR\", \"APTT\" in the last 72 hours.    Invalid input(s): \"PTP\"   Recent Labs     07/02/25  0236 07/03/25  0257 07/04/25  0447    136 134*   K 4.3 3.6 4.1   * 97 98   CO2 21 31 30   BUN 16 22* 23*   WBC  --   --  5.4   HGB  --   --  12.3   HCT  --   --  37.6   PLT  --   --  179     No results for input(s): \"TP\", \"GLOB\", \"GGT\" in the last 72 hours.    Invalid input(s): \"SGOT\", \"GPT\", \"AP\", \"TBIL\", \"ALB\", \"AML\", \"AMYP\", \"LPSE\", \"HLPSE\"  No components found for: \"GLPOC\"  No results for input(s): \"PH\", \"PCO2\", \"PO2\" in the last 72 hours.      Christiano Campoverde MD 07/04/25 12:06 PM     
ICS   Abdomen:    soft, not-distended, non-tender   Extremities:  Mild non-pitting edema BLE   Neurologic:  Gross motor and sensory apparatus intact       Clinic Evaluation:   KCCQ-12: scanned into EMR    6 minute walk test: N/A  5 meter gait: Pending.  Frality Survey:  Solitario Index ADL - 6/6  scanned into EMR     STS 4.2 Risk Score / Predicted 30 day mortality: - calculations scanned into EMR  Procedure Type: Isolated AVR   Perioperative Outcome Estimate %   Operative Mortality 6.57%   Morbidity & Mortality 19.4%   Stroke 3.79%   Renal Failure 3.63%   Reoperation 5.29%   Prolonged Ventilation 11.4%   Deep Sternal Wound Infection 0.105%   Long Hospital Stay (>14 days) 11.1%   Short Hospital Stay (<6 days)* 15.3%     Clinical Summary       Planned Surgery: Isolated AVR, Elective, ReOp#1 cardiovascular surgery   Demographics: 80 year old, White, female, 66.8kg, 157.5cm, BMI: 26.9 kg/m²   Insurance/Payor: Medicare   Lab Values: Creatinine: 0.68 mg/dL, Hematocrit: 37.5%, WBC Count: 5.3 10³/?L, Platelet Count: 768195 cells/?L   Substance Abuse: Never smoker   Risk Factors / Comorbidities: Hypertension   Pulmonary RF: Unknown CLD, Sleep Apnea   Vascular RF: Cerebrovascular Disease: CVA > 30 days   Cardiac Status: Acute and chronic heart failure, NYHA Class IV, Ejection Fraction = 60%   Valve Disease: Aortic Stenosis, Mitral Stenosis, Mild MR, Mild TR   Arrhythmia: Recent A-fib, Paroxysmal, Remote Atrial Flutter   Prev. 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 Needs cath, TAVR CTA, 5 meter walk, and discussion with Structural Heart Team.   Mild MR, mild TR, trace LA, mild MS: Noted on pre-op echo.  Acute on chronic HFpEF, NYHA class IV, LVEF 60-65%, stable: Diuresis per Cardiology for now. Blood pressure and

## 2025-07-05 LAB
ANION GAP SERPL CALC-SCNC: 5 MMOL/L (ref 2–12)
BASOPHILS # BLD: 0.03 K/UL (ref 0–0.1)
BASOPHILS NFR BLD: 0.5 % (ref 0–1)
BUN SERPL-MCNC: 22 MG/DL (ref 6–20)
BUN/CREAT SERPL: 20 (ref 12–20)
CALCIUM SERPL-MCNC: 8.9 MG/DL (ref 8.5–10.1)
CHLORIDE SERPL-SCNC: 98 MMOL/L (ref 97–108)
CO2 SERPL-SCNC: 31 MMOL/L (ref 21–32)
CREAT SERPL-MCNC: 1.09 MG/DL (ref 0.55–1.02)
DIFFERENTIAL METHOD BLD: NORMAL
EOSINOPHIL # BLD: 0.3 K/UL (ref 0–0.4)
EOSINOPHIL NFR BLD: 5.2 % (ref 0–7)
ERYTHROCYTE [DISTWIDTH] IN BLOOD BY AUTOMATED COUNT: 13.6 % (ref 11.5–14.5)
GLUCOSE SERPL-MCNC: 116 MG/DL (ref 65–100)
HCT VFR BLD AUTO: 37.1 % (ref 35–47)
HGB BLD-MCNC: 12.1 G/DL (ref 11.5–16)
IMM GRANULOCYTES # BLD AUTO: 0.01 K/UL (ref 0–0.04)
IMM GRANULOCYTES NFR BLD AUTO: 0.2 % (ref 0–0.5)
LYMPHOCYTES # BLD: 1.8 K/UL (ref 0.8–3.5)
LYMPHOCYTES NFR BLD: 31.3 % (ref 12–49)
MCH RBC QN AUTO: 28.1 PG (ref 26–34)
MCHC RBC AUTO-ENTMCNC: 32.6 G/DL (ref 30–36.5)
MCV RBC AUTO: 86.3 FL (ref 80–99)
MONOCYTES # BLD: 0.73 K/UL (ref 0–1)
MONOCYTES NFR BLD: 12.7 % (ref 5–13)
NEUTS SEG # BLD: 2.89 K/UL (ref 1.8–8)
NEUTS SEG NFR BLD: 50.1 % (ref 32–75)
NRBC # BLD: 0 K/UL (ref 0–0.01)
NRBC BLD-RTO: 0 PER 100 WBC
PLATELET # BLD AUTO: 185 K/UL (ref 150–400)
PMV BLD AUTO: 10.4 FL (ref 8.9–12.9)
POTASSIUM SERPL-SCNC: 3.5 MMOL/L (ref 3.5–5.1)
RBC # BLD AUTO: 4.3 M/UL (ref 3.8–5.2)
SODIUM SERPL-SCNC: 134 MMOL/L (ref 136–145)
WBC # BLD AUTO: 5.8 K/UL (ref 3.6–11)

## 2025-07-05 PROCEDURE — 1100000003 HC PRIVATE W/ TELEMETRY

## 2025-07-05 PROCEDURE — 6370000000 HC RX 637 (ALT 250 FOR IP): Performed by: INTERNAL MEDICINE

## 2025-07-05 PROCEDURE — 85025 COMPLETE CBC W/AUTO DIFF WBC: CPT

## 2025-07-05 PROCEDURE — 80048 BASIC METABOLIC PNL TOTAL CA: CPT

## 2025-07-05 PROCEDURE — 6360000002 HC RX W HCPCS: Performed by: STUDENT IN AN ORGANIZED HEALTH CARE EDUCATION/TRAINING PROGRAM

## 2025-07-05 PROCEDURE — 6370000000 HC RX 637 (ALT 250 FOR IP): Performed by: STUDENT IN AN ORGANIZED HEALTH CARE EDUCATION/TRAINING PROGRAM

## 2025-07-05 PROCEDURE — 2500000003 HC RX 250 WO HCPCS: Performed by: INTERNAL MEDICINE

## 2025-07-05 PROCEDURE — 36415 COLL VENOUS BLD VENIPUNCTURE: CPT

## 2025-07-05 RX ADMIN — POLYETHYLENE GLYCOL 3350 17 G: 17 POWDER, FOR SOLUTION ORAL at 06:31

## 2025-07-05 RX ADMIN — Medication 2000 UNITS: at 08:52

## 2025-07-05 RX ADMIN — SODIUM CHLORIDE, PRESERVATIVE FREE 10 ML: 5 INJECTION INTRAVENOUS at 08:52

## 2025-07-05 RX ADMIN — DILTIAZEM HYDROCHLORIDE 120 MG: 120 CAPSULE, EXTENDED RELEASE ORAL at 08:52

## 2025-07-05 RX ADMIN — FUROSEMIDE 40 MG: 40 TABLET ORAL at 08:52

## 2025-07-05 RX ADMIN — LEVOTHYROXINE SODIUM 88 MCG: 0.09 TABLET ORAL at 06:30

## 2025-07-05 RX ADMIN — ASPIRIN 81 MG: 81 TABLET, CHEWABLE ORAL at 08:52

## 2025-07-05 RX ADMIN — ATORVASTATIN CALCIUM 20 MG: 20 TABLET, FILM COATED ORAL at 21:08

## 2025-07-05 RX ADMIN — SODIUM CHLORIDE, PRESERVATIVE FREE 10 ML: 5 INJECTION INTRAVENOUS at 21:08

## 2025-07-05 RX ADMIN — POLYETHYLENE GLYCOL 3350 17 G: 17 POWDER, FOR SOLUTION ORAL at 21:09

## 2025-07-05 RX ADMIN — AMLODIPINE BESYLATE 5 MG: 5 TABLET ORAL at 08:52

## 2025-07-05 RX ADMIN — HYDROXYZINE HYDROCHLORIDE 50 MG: 50 TABLET, FILM COATED ORAL at 21:11

## 2025-07-05 RX ADMIN — PANTOPRAZOLE SODIUM 40 MG: 40 TABLET, DELAYED RELEASE ORAL at 06:30

## 2025-07-05 RX ADMIN — ENOXAPARIN SODIUM 40 MG: 100 INJECTION SUBCUTANEOUS at 08:49

## 2025-07-05 ASSESSMENT — PAIN SCALES - GENERAL: PAINLEVEL_OUTOF10: 0

## 2025-07-06 LAB
ANION GAP SERPL CALC-SCNC: 5 MMOL/L (ref 2–12)
BASOPHILS # BLD: 0.03 K/UL (ref 0–0.1)
BASOPHILS NFR BLD: 0.5 % (ref 0–1)
BUN SERPL-MCNC: 24 MG/DL (ref 6–20)
BUN/CREAT SERPL: 21 (ref 12–20)
CALCIUM SERPL-MCNC: 8.9 MG/DL (ref 8.5–10.1)
CHLORIDE SERPL-SCNC: 100 MMOL/L (ref 97–108)
CO2 SERPL-SCNC: 30 MMOL/L (ref 21–32)
CREAT SERPL-MCNC: 1.12 MG/DL (ref 0.55–1.02)
DIFFERENTIAL METHOD BLD: ABNORMAL
EOSINOPHIL # BLD: 0.32 K/UL (ref 0–0.4)
EOSINOPHIL NFR BLD: 5.6 % (ref 0–7)
ERYTHROCYTE [DISTWIDTH] IN BLOOD BY AUTOMATED COUNT: 13.7 % (ref 11.5–14.5)
GLUCOSE SERPL-MCNC: 113 MG/DL (ref 65–100)
HCT VFR BLD AUTO: 35.6 % (ref 35–47)
HGB BLD-MCNC: 11.7 G/DL (ref 11.5–16)
IMM GRANULOCYTES # BLD AUTO: 0.02 K/UL (ref 0–0.04)
IMM GRANULOCYTES NFR BLD AUTO: 0.3 % (ref 0–0.5)
LYMPHOCYTES # BLD: 1.83 K/UL (ref 0.8–3.5)
LYMPHOCYTES NFR BLD: 31.8 % (ref 12–49)
MCH RBC QN AUTO: 28.7 PG (ref 26–34)
MCHC RBC AUTO-ENTMCNC: 32.9 G/DL (ref 30–36.5)
MCV RBC AUTO: 87.5 FL (ref 80–99)
MONOCYTES # BLD: 0.8 K/UL (ref 0–1)
MONOCYTES NFR BLD: 13.9 % (ref 5–13)
NEUTS SEG # BLD: 2.75 K/UL (ref 1.8–8)
NEUTS SEG NFR BLD: 47.9 % (ref 32–75)
NRBC # BLD: 0 K/UL (ref 0–0.01)
NRBC BLD-RTO: 0 PER 100 WBC
PLATELET # BLD AUTO: 179 K/UL (ref 150–400)
PMV BLD AUTO: 10.6 FL (ref 8.9–12.9)
POTASSIUM SERPL-SCNC: 3.4 MMOL/L (ref 3.5–5.1)
RBC # BLD AUTO: 4.07 M/UL (ref 3.8–5.2)
SODIUM SERPL-SCNC: 135 MMOL/L (ref 136–145)
WBC # BLD AUTO: 5.8 K/UL (ref 3.6–11)

## 2025-07-06 PROCEDURE — 6370000000 HC RX 637 (ALT 250 FOR IP): Performed by: INTERNAL MEDICINE

## 2025-07-06 PROCEDURE — 6370000000 HC RX 637 (ALT 250 FOR IP): Performed by: STUDENT IN AN ORGANIZED HEALTH CARE EDUCATION/TRAINING PROGRAM

## 2025-07-06 PROCEDURE — 2500000003 HC RX 250 WO HCPCS: Performed by: INTERNAL MEDICINE

## 2025-07-06 PROCEDURE — 1100000003 HC PRIVATE W/ TELEMETRY

## 2025-07-06 PROCEDURE — 80048 BASIC METABOLIC PNL TOTAL CA: CPT

## 2025-07-06 PROCEDURE — 36415 COLL VENOUS BLD VENIPUNCTURE: CPT

## 2025-07-06 PROCEDURE — 85025 COMPLETE CBC W/AUTO DIFF WBC: CPT

## 2025-07-06 PROCEDURE — 6360000002 HC RX W HCPCS: Performed by: STUDENT IN AN ORGANIZED HEALTH CARE EDUCATION/TRAINING PROGRAM

## 2025-07-06 RX ORDER — POTASSIUM CHLORIDE 750 MG/1
40 TABLET, EXTENDED RELEASE ORAL ONCE
Status: DISCONTINUED | OUTPATIENT
Start: 2025-07-06 | End: 2025-07-06

## 2025-07-06 RX ORDER — POTASSIUM CHLORIDE 750 MG/1
20 TABLET, EXTENDED RELEASE ORAL DAILY
Status: DISCONTINUED | OUTPATIENT
Start: 2025-07-07 | End: 2025-07-10 | Stop reason: HOSPADM

## 2025-07-06 RX ADMIN — ENOXAPARIN SODIUM 40 MG: 100 INJECTION SUBCUTANEOUS at 10:13

## 2025-07-06 RX ADMIN — POLYETHYLENE GLYCOL 3350 17 G: 17 POWDER, FOR SOLUTION ORAL at 06:35

## 2025-07-06 RX ADMIN — ATORVASTATIN CALCIUM 20 MG: 20 TABLET, FILM COATED ORAL at 19:57

## 2025-07-06 RX ADMIN — HYDROXYZINE HYDROCHLORIDE 50 MG: 50 TABLET, FILM COATED ORAL at 19:57

## 2025-07-06 RX ADMIN — AMLODIPINE BESYLATE 5 MG: 5 TABLET ORAL at 10:13

## 2025-07-06 RX ADMIN — PANTOPRAZOLE SODIUM 40 MG: 40 TABLET, DELAYED RELEASE ORAL at 06:25

## 2025-07-06 RX ADMIN — DILTIAZEM HYDROCHLORIDE 120 MG: 120 CAPSULE, EXTENDED RELEASE ORAL at 10:12

## 2025-07-06 RX ADMIN — SODIUM CHLORIDE, PRESERVATIVE FREE 10 ML: 5 INJECTION INTRAVENOUS at 10:13

## 2025-07-06 RX ADMIN — ASPIRIN 81 MG: 81 TABLET, CHEWABLE ORAL at 10:13

## 2025-07-06 RX ADMIN — POLYETHYLENE GLYCOL 3350 17 G: 17 POWDER, FOR SOLUTION ORAL at 19:57

## 2025-07-06 RX ADMIN — LEVOTHYROXINE SODIUM 88 MCG: 0.09 TABLET ORAL at 06:25

## 2025-07-06 RX ADMIN — Medication 2000 UNITS: at 10:12

## 2025-07-06 RX ADMIN — FUROSEMIDE 40 MG: 40 TABLET ORAL at 10:12

## 2025-07-07 PROCEDURE — 1100000003 HC PRIVATE W/ TELEMETRY

## 2025-07-07 PROCEDURE — 6370000000 HC RX 637 (ALT 250 FOR IP): Performed by: INTERNAL MEDICINE

## 2025-07-07 PROCEDURE — 2500000003 HC RX 250 WO HCPCS: Performed by: INTERNAL MEDICINE

## 2025-07-07 PROCEDURE — 6360000002 HC RX W HCPCS: Performed by: STUDENT IN AN ORGANIZED HEALTH CARE EDUCATION/TRAINING PROGRAM

## 2025-07-07 PROCEDURE — 6370000000 HC RX 637 (ALT 250 FOR IP): Performed by: STUDENT IN AN ORGANIZED HEALTH CARE EDUCATION/TRAINING PROGRAM

## 2025-07-07 RX ADMIN — ENOXAPARIN SODIUM 40 MG: 100 INJECTION SUBCUTANEOUS at 08:52

## 2025-07-07 RX ADMIN — PANTOPRAZOLE SODIUM 40 MG: 40 TABLET, DELAYED RELEASE ORAL at 05:10

## 2025-07-07 RX ADMIN — SODIUM CHLORIDE, PRESERVATIVE FREE 10 ML: 5 INJECTION INTRAVENOUS at 08:58

## 2025-07-07 RX ADMIN — LEVOTHYROXINE SODIUM 88 MCG: 0.09 TABLET ORAL at 05:10

## 2025-07-07 RX ADMIN — Medication 2000 UNITS: at 08:52

## 2025-07-07 RX ADMIN — ASPIRIN 81 MG: 81 TABLET, CHEWABLE ORAL at 08:52

## 2025-07-07 RX ADMIN — POTASSIUM CHLORIDE 20 MEQ: 750 TABLET, FILM COATED, EXTENDED RELEASE ORAL at 08:52

## 2025-07-07 RX ADMIN — HYDROXYZINE HYDROCHLORIDE 50 MG: 50 TABLET, FILM COATED ORAL at 20:04

## 2025-07-07 RX ADMIN — DILTIAZEM HYDROCHLORIDE 120 MG: 120 CAPSULE, EXTENDED RELEASE ORAL at 08:52

## 2025-07-07 RX ADMIN — POLYETHYLENE GLYCOL 3350 17 G: 17 POWDER, FOR SOLUTION ORAL at 08:52

## 2025-07-07 RX ADMIN — ATORVASTATIN CALCIUM 20 MG: 20 TABLET, FILM COATED ORAL at 20:03

## 2025-07-07 RX ADMIN — AMLODIPINE BESYLATE 5 MG: 5 TABLET ORAL at 08:52

## 2025-07-07 RX ADMIN — FUROSEMIDE 40 MG: 40 TABLET ORAL at 08:52

## 2025-07-07 RX ADMIN — POLYETHYLENE GLYCOL 3350 17 G: 17 POWDER, FOR SOLUTION ORAL at 20:04

## 2025-07-07 NOTE — CARE COORDINATION
Transition of Care Plan:    RUR: 10% \"low risk\"  Prior Level of Functioning: Independent with ADL's   Disposition: Home with family support   JOHN: 7/10  If SNF or IPR: Date FOC offered: N/A  Follow up appointments: PCP/Specialists as indicated  DME needed: None   Transportation at discharge: Family to transport   IM/IMM Medicare/ letter given: 2nd IM needed prior to d/c   Is patient a  and connected with [facility]? No  Caregiver Contact: Karen Durán (child), [phone]  Discharge Caregiver contacted prior to discharge? To be contacted  Care Conference needed? Not at this time   Barriers to discharge: CTS, TAVR plan    1340 PM: Chart reviewed. CM following for d/c planning. CM awaiting CTS plan.     THOMAS Yeager  Care Management  [facility]   [phone]

## 2025-07-07 NOTE — CARDIO/PULMONARY
Chart reviewed: Patient is 80 y.o. female admitted with NGUYỄN (dyspnea on exertion) [R06.09]  Acute chest pain [R07.9]  Aortic valve stenosis, etiology of cardiac valve disease unspecified [I35.0]  Atrial fibrillation, unspecified type (HCC) [I48.91]  Chest pain, unspecified type [R07.9]  Symptomatic severe aortic stenosis with normal ejection fraction [I35.0]    S/P L&R heart catheterization on 7/2/25. Per cath report, \"No angiographically significant coronary artery disease. \"     Patient being evaluated for TAVR vs SAVR. CTS following.    Cardiac Rehab will continue to follow.         ROYA CORTES RN

## 2025-07-08 ENCOUNTER — PREP FOR PROCEDURE (OUTPATIENT)
Age: 80
End: 2025-07-08

## 2025-07-08 PROBLEM — I35.0 AORTIC STENOSIS: Status: ACTIVE | Noted: 2025-07-08

## 2025-07-08 LAB
ANION GAP SERPL CALC-SCNC: 6 MMOL/L (ref 2–12)
BASOPHILS # BLD: 0.03 K/UL (ref 0–0.1)
BASOPHILS NFR BLD: 0.6 % (ref 0–1)
BUN SERPL-MCNC: 20 MG/DL (ref 6–20)
BUN/CREAT SERPL: 18 (ref 12–20)
CALCIUM SERPL-MCNC: 8.9 MG/DL (ref 8.5–10.1)
CHLORIDE SERPL-SCNC: 101 MMOL/L (ref 97–108)
CO2 SERPL-SCNC: 30 MMOL/L (ref 21–32)
CREAT SERPL-MCNC: 1.09 MG/DL (ref 0.55–1.02)
DIFFERENTIAL METHOD BLD: ABNORMAL
EOSINOPHIL # BLD: 0.26 K/UL (ref 0–0.4)
EOSINOPHIL NFR BLD: 4.9 % (ref 0–7)
ERYTHROCYTE [DISTWIDTH] IN BLOOD BY AUTOMATED COUNT: 13.6 % (ref 11.5–14.5)
GLUCOSE SERPL-MCNC: 100 MG/DL (ref 65–100)
HCT VFR BLD AUTO: 35.8 % (ref 35–47)
HGB BLD-MCNC: 11.4 G/DL (ref 11.5–16)
IMM GRANULOCYTES # BLD AUTO: 0.01 K/UL (ref 0–0.04)
IMM GRANULOCYTES NFR BLD AUTO: 0.2 % (ref 0–0.5)
LYMPHOCYTES # BLD: 1.95 K/UL (ref 0.8–3.5)
LYMPHOCYTES NFR BLD: 36.9 % (ref 12–49)
MCH RBC QN AUTO: 28.2 PG (ref 26–34)
MCHC RBC AUTO-ENTMCNC: 31.8 G/DL (ref 30–36.5)
MCV RBC AUTO: 88.6 FL (ref 80–99)
MONOCYTES # BLD: 0.76 K/UL (ref 0–1)
MONOCYTES NFR BLD: 14.4 % (ref 5–13)
NEUTS SEG # BLD: 2.27 K/UL (ref 1.8–8)
NEUTS SEG NFR BLD: 43 % (ref 32–75)
NRBC # BLD: 0 K/UL (ref 0–0.01)
NRBC BLD-RTO: 0 PER 100 WBC
PLATELET # BLD AUTO: 193 K/UL (ref 150–400)
PMV BLD AUTO: 10.9 FL (ref 8.9–12.9)
POTASSIUM SERPL-SCNC: 3.9 MMOL/L (ref 3.5–5.1)
RBC # BLD AUTO: 4.04 M/UL (ref 3.8–5.2)
SODIUM SERPL-SCNC: 137 MMOL/L (ref 136–145)
WBC # BLD AUTO: 5.3 K/UL (ref 3.6–11)

## 2025-07-08 PROCEDURE — 6360000002 HC RX W HCPCS: Performed by: STUDENT IN AN ORGANIZED HEALTH CARE EDUCATION/TRAINING PROGRAM

## 2025-07-08 PROCEDURE — 2500000003 HC RX 250 WO HCPCS: Performed by: INTERNAL MEDICINE

## 2025-07-08 PROCEDURE — 1100000003 HC PRIVATE W/ TELEMETRY

## 2025-07-08 PROCEDURE — 36415 COLL VENOUS BLD VENIPUNCTURE: CPT

## 2025-07-08 PROCEDURE — 80048 BASIC METABOLIC PNL TOTAL CA: CPT

## 2025-07-08 PROCEDURE — 6370000000 HC RX 637 (ALT 250 FOR IP): Performed by: STUDENT IN AN ORGANIZED HEALTH CARE EDUCATION/TRAINING PROGRAM

## 2025-07-08 PROCEDURE — 6370000000 HC RX 637 (ALT 250 FOR IP): Performed by: INTERNAL MEDICINE

## 2025-07-08 PROCEDURE — 85025 COMPLETE CBC W/AUTO DIFF WBC: CPT

## 2025-07-08 RX ORDER — GUAIFENESIN 200 MG/10ML
200 LIQUID ORAL EVERY 4 HOURS PRN
Status: DISCONTINUED | OUTPATIENT
Start: 2025-07-08 | End: 2025-07-10 | Stop reason: HOSPADM

## 2025-07-08 RX ORDER — CALCIUM CARBONATE 500 MG/1
500 TABLET, CHEWABLE ORAL 3 TIMES DAILY PRN
Status: DISCONTINUED | OUTPATIENT
Start: 2025-07-08 | End: 2025-07-10 | Stop reason: HOSPADM

## 2025-07-08 RX ADMIN — ACETAMINOPHEN 650 MG: 325 TABLET ORAL at 18:43

## 2025-07-08 RX ADMIN — CALCIUM CARBONATE (ANTACID) CHEW TAB 500 MG 500 MG: 500 CHEW TAB at 13:09

## 2025-07-08 RX ADMIN — POLYETHYLENE GLYCOL 3350 17 G: 17 POWDER, FOR SOLUTION ORAL at 21:17

## 2025-07-08 RX ADMIN — AMLODIPINE BESYLATE 5 MG: 5 TABLET ORAL at 09:29

## 2025-07-08 RX ADMIN — ASPIRIN 81 MG: 81 TABLET, CHEWABLE ORAL at 09:29

## 2025-07-08 RX ADMIN — SODIUM CHLORIDE, PRESERVATIVE FREE 10 ML: 5 INJECTION INTRAVENOUS at 09:31

## 2025-07-08 RX ADMIN — DILTIAZEM HYDROCHLORIDE 120 MG: 120 CAPSULE, EXTENDED RELEASE ORAL at 09:29

## 2025-07-08 RX ADMIN — SODIUM CHLORIDE, PRESERVATIVE FREE 10 ML: 5 INJECTION INTRAVENOUS at 22:23

## 2025-07-08 RX ADMIN — POLYETHYLENE GLYCOL 3350 17 G: 17 POWDER, FOR SOLUTION ORAL at 09:29

## 2025-07-08 RX ADMIN — HYDROXYZINE HYDROCHLORIDE 50 MG: 50 TABLET, FILM COATED ORAL at 21:16

## 2025-07-08 RX ADMIN — PANTOPRAZOLE SODIUM 40 MG: 40 TABLET, DELAYED RELEASE ORAL at 05:06

## 2025-07-08 RX ADMIN — FUROSEMIDE 40 MG: 40 TABLET ORAL at 09:29

## 2025-07-08 RX ADMIN — LEVOTHYROXINE SODIUM 88 MCG: 0.09 TABLET ORAL at 05:06

## 2025-07-08 RX ADMIN — ENOXAPARIN SODIUM 40 MG: 100 INJECTION SUBCUTANEOUS at 09:29

## 2025-07-08 RX ADMIN — POTASSIUM CHLORIDE 20 MEQ: 750 TABLET, FILM COATED, EXTENDED RELEASE ORAL at 09:30

## 2025-07-08 RX ADMIN — Medication 2000 UNITS: at 09:29

## 2025-07-08 RX ADMIN — ATORVASTATIN CALCIUM 20 MG: 20 TABLET, FILM COATED ORAL at 21:16

## 2025-07-08 ASSESSMENT — PAIN DESCRIPTION - ORIENTATION: ORIENTATION: LOWER

## 2025-07-08 ASSESSMENT — PAIN DESCRIPTION - LOCATION: LOCATION: BACK

## 2025-07-08 ASSESSMENT — PAIN DESCRIPTION - DESCRIPTORS: DESCRIPTORS: ACHING

## 2025-07-08 ASSESSMENT — PAIN SCALES - GENERAL: PAINLEVEL_OUTOF10: 4

## 2025-07-08 ASSESSMENT — PAIN - FUNCTIONAL ASSESSMENT: PAIN_FUNCTIONAL_ASSESSMENT: ACTIVITIES ARE NOT PREVENTED

## 2025-07-08 NOTE — CONSENT
Informed Consent for Blood Component Transfusion Note    I have discussed with the patient the rationale for blood component transfusion; its benefits in treating or preventing fatigue, organ damage, or death; and its risk which includes mild transfusion reactions, rare risk of blood borne infection, or more serious but rare reactions. I have discussed the alternatives to transfusion, including the risk and consequences of not receiving transfusion. The patient had an opportunity to ask questions and had agreed to proceed with transfusion of blood components.    Electronically signed by LAURA Quintana NP on 7/8/25 at 8:48 AM EDT

## 2025-07-08 NOTE — DISCHARGE INSTRUCTIONS
Cardiac Surgery Specialist - Valve Clinic    [address]      [place] [address]  Office- [phone]  Fax- [phone]  _____________________________________________________________  Dr. Bridger Fay, [facility]   Dr. Austin Zimmer, [facility]  Dr. Gt Albrecht, [facility]     Sheila Michael, MSN, ACNPC-AG  Hanh Parker, MSN, AGPCNP-BC  ___________________________________________________________     Surgery & Date: Procedure(s):  Left and right heart cath / coronary angiography  Ultrasound guided vascular access    Patient ID:  Roxanna Schuster  [id number]  80 y.o.  1945    Admit Date: 6/27/2025    Discharge Date: 7/8/2025     Admitting Physician: [unfilled]     Discharge Physician: [unfilled]    Admission Diagnoses:   NGUYỄN (dyspnea on exertion) [R06.09]  Acute chest pain [R07.9]  Aortic valve stenosis, etiology of cardiac valve disease unspecified [I35.0]  Atrial fibrillation, unspecified type (HCC) [I48.91]  Chest pain, unspecified type [R07.9]  Symptomatic severe aortic stenosis with normal ejection fraction [I35.0]    Discharge Diagnoses:   [unfilled]    Discharge Condition: {condition:48968358}    Cardiology Procedures this Admission:  {CARDIOLOGY PROCEDURES:36807951}    Disposition: {disposition:94918}    Reference discharge instructions provided by nursing for diet and activity.    Signed:  LAURA Quintana NP  7/8/2025  9:57 AM        MEDICATIONS:  Please refer to your After Visit Summary for your medication list.           Guidelines to Follow After Your TAVR  The purpose of these instructions is to provide you with information on how to care for yourself after your Transcatheter Aortic Valve Replacement (TAVR). If you have any questions after your discharge, please call us at [phone].  Prior

## 2025-07-09 ENCOUNTER — ANESTHESIA EVENT (OUTPATIENT)
Facility: HOSPITAL | Age: 80
DRG: 267 | End: 2025-07-09
Payer: MEDICARE

## 2025-07-09 ENCOUNTER — APPOINTMENT (OUTPATIENT)
Facility: HOSPITAL | Age: 80
DRG: 267 | End: 2025-07-09
Payer: MEDICARE

## 2025-07-09 ENCOUNTER — APPOINTMENT (OUTPATIENT)
Facility: HOSPITAL | Age: 80
DRG: 267 | End: 2025-07-09
Attending: INTERNAL MEDICINE
Payer: MEDICARE

## 2025-07-09 ENCOUNTER — ANESTHESIA (OUTPATIENT)
Facility: HOSPITAL | Age: 80
DRG: 267 | End: 2025-07-09
Payer: MEDICARE

## 2025-07-09 LAB
ABO + RH BLD: NORMAL
ACT BLD: 118 SECS (ref 79–138)
ACT BLD: 239 SECS (ref 79–138)
ANION GAP SERPL CALC-SCNC: 4 MMOL/L (ref 2–12)
BASOPHILS # BLD: 0.02 K/UL (ref 0–0.1)
BASOPHILS NFR BLD: 0.4 % (ref 0–1)
BLD PROD TYP BPU: NORMAL
BLOOD BANK DISPENSE STATUS: NORMAL
BLOOD GROUP ANTIBODIES SERPL: NORMAL
BPU ID: NORMAL
BUN SERPL-MCNC: 20 MG/DL (ref 6–20)
BUN/CREAT SERPL: 20 (ref 12–20)
CALCIUM SERPL-MCNC: 9.5 MG/DL (ref 8.5–10.1)
CHLORIDE SERPL-SCNC: 101 MMOL/L (ref 97–108)
CO2 SERPL-SCNC: 30 MMOL/L (ref 21–32)
CREAT SERPL-MCNC: 1 MG/DL (ref 0.55–1.02)
CROSSMATCH RESULT: NORMAL
DIFFERENTIAL METHOD BLD: ABNORMAL
ECHO BSA: 1.71 M2
ECHO LV EF PHYSICIAN: 55 %
EOSINOPHIL # BLD: 0.29 K/UL (ref 0–0.4)
EOSINOPHIL NFR BLD: 5.9 % (ref 0–7)
ERYTHROCYTE [DISTWIDTH] IN BLOOD BY AUTOMATED COUNT: 13.4 % (ref 11.5–14.5)
GLUCOSE SERPL-MCNC: 100 MG/DL (ref 65–100)
HCT VFR BLD AUTO: 38.1 % (ref 35–47)
HCT VFR BLD AUTO: 38.9 % (ref 35–47)
HGB BLD-MCNC: 12.2 G/DL (ref 11.5–16)
HGB BLD-MCNC: 12.6 G/DL (ref 11.5–16)
HISTORY CHECK: NORMAL
IMM GRANULOCYTES # BLD AUTO: 0.01 K/UL (ref 0–0.04)
IMM GRANULOCYTES NFR BLD AUTO: 0.2 % (ref 0–0.5)
LYMPHOCYTES # BLD: 1.69 K/UL (ref 0.8–3.5)
LYMPHOCYTES NFR BLD: 34.5 % (ref 12–49)
MCH RBC QN AUTO: 28.3 PG (ref 26–34)
MCHC RBC AUTO-ENTMCNC: 32 G/DL (ref 30–36.5)
MCV RBC AUTO: 88.4 FL (ref 80–99)
MONOCYTES # BLD: 0.69 K/UL (ref 0–1)
MONOCYTES NFR BLD: 14.1 % (ref 5–13)
NEUTS SEG # BLD: 2.2 K/UL (ref 1.8–8)
NEUTS SEG NFR BLD: 44.9 % (ref 32–75)
NRBC # BLD: 0 K/UL (ref 0–0.01)
NRBC BLD-RTO: 0 PER 100 WBC
PLATELET # BLD AUTO: 206 K/UL (ref 150–400)
PMV BLD AUTO: 11.3 FL (ref 8.9–12.9)
POTASSIUM SERPL-SCNC: 3.5 MMOL/L (ref 3.5–5.1)
RBC # BLD AUTO: 4.31 M/UL (ref 3.8–5.2)
SODIUM SERPL-SCNC: 135 MMOL/L (ref 136–145)
SPECIMEN EXP DATE BLD: NORMAL
UNIT DIVISION: 0
WBC # BLD AUTO: 4.9 K/UL (ref 3.6–11)

## 2025-07-09 PROCEDURE — 80048 BASIC METABOLIC PNL TOTAL CA: CPT

## 2025-07-09 PROCEDURE — 93321 DOPPLER ECHO F-UP/LMTD STD: CPT

## 2025-07-09 PROCEDURE — 93306 TTE W/DOPPLER COMPLETE: CPT

## 2025-07-09 PROCEDURE — 86901 BLOOD TYPING SEROLOGIC RH(D): CPT

## 2025-07-09 PROCEDURE — 3E033XZ INTRODUCTION OF VASOPRESSOR INTO PERIPHERAL VEIN, PERCUTANEOUS APPROACH: ICD-10-PCS | Performed by: STUDENT IN AN ORGANIZED HEALTH CARE EDUCATION/TRAINING PROGRAM

## 2025-07-09 PROCEDURE — 6370000000 HC RX 637 (ALT 250 FOR IP): Performed by: INTERNAL MEDICINE

## 2025-07-09 PROCEDURE — 86923 COMPATIBILITY TEST ELECTRIC: CPT

## 2025-07-09 PROCEDURE — 2500000003 HC RX 250 WO HCPCS: Performed by: INTERNAL MEDICINE

## 2025-07-09 PROCEDURE — 6370000000 HC RX 637 (ALT 250 FOR IP): Performed by: STUDENT IN AN ORGANIZED HEALTH CARE EDUCATION/TRAINING PROGRAM

## 2025-07-09 PROCEDURE — 86900 BLOOD TYPING SEROLOGIC ABO: CPT

## 2025-07-09 PROCEDURE — 2580000003 HC RX 258: Performed by: NURSE ANESTHETIST, CERTIFIED REGISTERED

## 2025-07-09 PROCEDURE — 02RF38N REPLACEMENT OF AORTIC VALVE WITH ZOOPLASTIC TISSUE, USING RAPID DEPLOYMENT TECHNIQUE, PERCUTANEOUS APPROACH: ICD-10-PCS | Performed by: INTERNAL MEDICINE

## 2025-07-09 PROCEDURE — 6360000004 HC RX CONTRAST MEDICATION: Performed by: INTERNAL MEDICINE

## 2025-07-09 PROCEDURE — 2580000003 HC RX 258

## 2025-07-09 PROCEDURE — 85014 HEMATOCRIT: CPT

## 2025-07-09 PROCEDURE — 36415 COLL VENOUS BLD VENIPUNCTURE: CPT

## 2025-07-09 PROCEDURE — 2580000003 HC RX 258: Performed by: INTERNAL MEDICINE

## 2025-07-09 PROCEDURE — 6360000002 HC RX W HCPCS

## 2025-07-09 PROCEDURE — 93005 ELECTROCARDIOGRAM TRACING: CPT | Performed by: STUDENT IN AN ORGANIZED HEALTH CARE EDUCATION/TRAINING PROGRAM

## 2025-07-09 PROCEDURE — 85018 HEMOGLOBIN: CPT

## 2025-07-09 PROCEDURE — 86850 RBC ANTIBODY SCREEN: CPT

## 2025-07-09 PROCEDURE — 76000 FLUOROSCOPY <1 HR PHYS/QHP: CPT

## 2025-07-09 PROCEDURE — 6360000002 HC RX W HCPCS: Performed by: NURSE ANESTHETIST, CERTIFIED REGISTERED

## 2025-07-09 PROCEDURE — 6360000002 HC RX W HCPCS: Performed by: INTERNAL MEDICINE

## 2025-07-09 PROCEDURE — 71045 X-RAY EXAM CHEST 1 VIEW: CPT

## 2025-07-09 PROCEDURE — 85025 COMPLETE CBC W/AUTO DIFF WBC: CPT

## 2025-07-09 PROCEDURE — 1100000003 HC PRIVATE W/ TELEMETRY

## 2025-07-09 PROCEDURE — 2500000003 HC RX 250 WO HCPCS: Performed by: NURSE ANESTHETIST, CERTIFIED REGISTERED

## 2025-07-09 PROCEDURE — 85347 COAGULATION TIME ACTIVATED: CPT

## 2025-07-09 RX ORDER — HEPARIN SODIUM 1000 [USP'U]/ML
INJECTION, SOLUTION INTRAVENOUS; SUBCUTANEOUS
Status: DISCONTINUED | OUTPATIENT
Start: 2025-07-09 | End: 2025-07-09 | Stop reason: SDUPTHER

## 2025-07-09 RX ORDER — SODIUM CHLORIDE 9 MG/ML
INJECTION, SOLUTION INTRAVENOUS
Status: DISCONTINUED | OUTPATIENT
Start: 2025-07-09 | End: 2025-07-09 | Stop reason: SDUPTHER

## 2025-07-09 RX ORDER — SODIUM CHLORIDE, SODIUM LACTATE, POTASSIUM CHLORIDE, CALCIUM CHLORIDE 600; 310; 30; 20 MG/100ML; MG/100ML; MG/100ML; MG/100ML
INJECTION, SOLUTION INTRAVENOUS CONTINUOUS
Status: DISCONTINUED | OUTPATIENT
Start: 2025-07-09 | End: 2025-07-10

## 2025-07-09 RX ORDER — PROPOFOL 10 MG/ML
INJECTION, EMULSION INTRAVENOUS
Status: DISCONTINUED | OUTPATIENT
Start: 2025-07-09 | End: 2025-07-09 | Stop reason: SDUPTHER

## 2025-07-09 RX ORDER — DIPHENHYDRAMINE HYDROCHLORIDE 50 MG/ML
25 INJECTION, SOLUTION INTRAMUSCULAR; INTRAVENOUS ONCE
Status: COMPLETED | OUTPATIENT
Start: 2025-07-09 | End: 2025-07-09

## 2025-07-09 RX ORDER — LIDOCAINE HYDROCHLORIDE 20 MG/ML
INJECTION, SOLUTION EPIDURAL; INFILTRATION; INTRACAUDAL; PERINEURAL
Status: DISCONTINUED | OUTPATIENT
Start: 2025-07-09 | End: 2025-07-09 | Stop reason: SDUPTHER

## 2025-07-09 RX ORDER — ONDANSETRON 2 MG/ML
INJECTION INTRAMUSCULAR; INTRAVENOUS
Status: DISCONTINUED | OUTPATIENT
Start: 2025-07-09 | End: 2025-07-09 | Stop reason: SDUPTHER

## 2025-07-09 RX ORDER — FENTANYL CITRATE 50 UG/ML
INJECTION, SOLUTION INTRAMUSCULAR; INTRAVENOUS
Status: DISCONTINUED | OUTPATIENT
Start: 2025-07-09 | End: 2025-07-09 | Stop reason: SDUPTHER

## 2025-07-09 RX ORDER — IOPAMIDOL 755 MG/ML
INJECTION, SOLUTION INTRAVASCULAR PRN
Status: DISCONTINUED | OUTPATIENT
Start: 2025-07-09 | End: 2025-07-09 | Stop reason: HOSPADM

## 2025-07-09 RX ORDER — ACETAMINOPHEN 325 MG/1
650 TABLET ORAL EVERY 4 HOURS PRN
Status: DISCONTINUED | OUTPATIENT
Start: 2025-07-09 | End: 2025-07-10 | Stop reason: HOSPADM

## 2025-07-09 RX ORDER — ASPIRIN 81 MG/1
81 TABLET ORAL DAILY
Status: DISCONTINUED | OUTPATIENT
Start: 2025-07-10 | End: 2025-07-10

## 2025-07-09 RX ORDER — DEXAMETHASONE SODIUM PHOSPHATE 4 MG/ML
INJECTION, SOLUTION INTRA-ARTICULAR; INTRALESIONAL; INTRAMUSCULAR; INTRAVENOUS; SOFT TISSUE
Status: DISCONTINUED | OUTPATIENT
Start: 2025-07-09 | End: 2025-07-09 | Stop reason: SDUPTHER

## 2025-07-09 RX ORDER — EPHEDRINE SULFATE/0.9% NACL/PF 50 MG/5 ML
SYRINGE (ML) INTRAVENOUS
Status: DISCONTINUED | OUTPATIENT
Start: 2025-07-09 | End: 2025-07-09 | Stop reason: SDUPTHER

## 2025-07-09 RX ORDER — SODIUM CHLORIDE 9 MG/ML
INJECTION, SOLUTION INTRAVENOUS CONTINUOUS
Status: DISCONTINUED | OUTPATIENT
Start: 2025-07-09 | End: 2025-07-10

## 2025-07-09 RX ORDER — PROTAMINE SULFATE 10 MG/ML
INJECTION, SOLUTION INTRAVENOUS
Status: DISCONTINUED | OUTPATIENT
Start: 2025-07-09 | End: 2025-07-09 | Stop reason: SDUPTHER

## 2025-07-09 RX ADMIN — DEXAMETHASONE SODIUM PHOSPHATE 4 MG: 4 INJECTION, SOLUTION INTRAMUSCULAR; INTRAVENOUS at 14:20

## 2025-07-09 RX ADMIN — VANCOMYCIN HYDROCHLORIDE 1000 MG: 1 INJECTION, POWDER, LYOPHILIZED, FOR SOLUTION INTRAVENOUS at 14:20

## 2025-07-09 RX ADMIN — Medication 5 MG: at 14:34

## 2025-07-09 RX ADMIN — LIDOCAINE HYDROCHLORIDE 60 MG: 20 INJECTION, SOLUTION EPIDURAL; INFILTRATION; INTRACAUDAL; PERINEURAL at 14:08

## 2025-07-09 RX ADMIN — SODIUM CHLORIDE, PRESERVATIVE FREE 10 ML: 5 INJECTION INTRAVENOUS at 20:44

## 2025-07-09 RX ADMIN — SODIUM CHLORIDE, SODIUM LACTATE, POTASSIUM CHLORIDE, AND CALCIUM CHLORIDE: .6; .31; .03; .02 INJECTION, SOLUTION INTRAVENOUS at 20:43

## 2025-07-09 RX ADMIN — FENTANYL CITRATE 50 MCG: 50 INJECTION, SOLUTION INTRAMUSCULAR; INTRAVENOUS at 15:54

## 2025-07-09 RX ADMIN — PROPOFOL 20 MG: 10 INJECTION, EMULSION INTRAVENOUS at 14:08

## 2025-07-09 RX ADMIN — SODIUM CHLORIDE, PRESERVATIVE FREE 10 ML: 5 INJECTION INTRAVENOUS at 09:22

## 2025-07-09 RX ADMIN — PHENYLEPHRINE HYDROCHLORIDE 30 MCG/MIN: 10 INJECTION INTRAVENOUS at 14:30

## 2025-07-09 RX ADMIN — FENTANYL CITRATE 50 MCG: 50 INJECTION, SOLUTION INTRAMUSCULAR; INTRAVENOUS at 14:20

## 2025-07-09 RX ADMIN — ACETAMINOPHEN 650 MG: 325 TABLET ORAL at 06:12

## 2025-07-09 RX ADMIN — DIPHENHYDRAMINE HYDROCHLORIDE 25 MG: 50 INJECTION INTRAMUSCULAR; INTRAVENOUS at 11:11

## 2025-07-09 RX ADMIN — ATORVASTATIN CALCIUM 20 MG: 20 TABLET, FILM COATED ORAL at 20:43

## 2025-07-09 RX ADMIN — LEVOTHYROXINE SODIUM 88 MCG: 0.09 TABLET ORAL at 06:09

## 2025-07-09 RX ADMIN — PROPOFOL 60 MCG/KG/MIN: 10 INJECTION, EMULSION INTRAVENOUS at 14:10

## 2025-07-09 RX ADMIN — HEPARIN SODIUM 2000 UNITS: 1000 INJECTION, SOLUTION INTRAVENOUS; SUBCUTANEOUS at 15:20

## 2025-07-09 RX ADMIN — SODIUM CHLORIDE: 0.9 INJECTION, SOLUTION INTRAVENOUS at 06:16

## 2025-07-09 RX ADMIN — ACETAMINOPHEN 650 MG: 325 TABLET ORAL at 20:47

## 2025-07-09 RX ADMIN — PROTAMINE SULFATE 80 MG: 10 INJECTION, SOLUTION INTRAVENOUS at 15:43

## 2025-07-09 RX ADMIN — HYDROXYZINE HYDROCHLORIDE 50 MG: 50 TABLET, FILM COATED ORAL at 20:43

## 2025-07-09 RX ADMIN — POLYETHYLENE GLYCOL 3350 17 G: 17 POWDER, FOR SOLUTION ORAL at 20:43

## 2025-07-09 RX ADMIN — HEPARIN SODIUM 10000 UNITS: 1000 INJECTION, SOLUTION INTRAVENOUS; SUBCUTANEOUS at 15:08

## 2025-07-09 RX ADMIN — HYDROCORTISONE: 1 CREAM TOPICAL at 09:22

## 2025-07-09 RX ADMIN — ASPIRIN 81 MG: 81 TABLET, CHEWABLE ORAL at 09:22

## 2025-07-09 RX ADMIN — SODIUM CHLORIDE: 9 INJECTION, SOLUTION INTRAVENOUS at 14:23

## 2025-07-09 RX ADMIN — ONDANSETRON HYDROCHLORIDE 4 MG: 2 INJECTION, SOLUTION INTRAMUSCULAR; INTRAVENOUS at 15:42

## 2025-07-09 RX ADMIN — PANTOPRAZOLE SODIUM 40 MG: 40 TABLET, DELAYED RELEASE ORAL at 06:09

## 2025-07-09 ASSESSMENT — PAIN DESCRIPTION - ORIENTATION
ORIENTATION: RIGHT;LEFT;MID
ORIENTATION: RIGHT;LEFT;MID

## 2025-07-09 ASSESSMENT — PAIN SCALES - GENERAL
PAINLEVEL_OUTOF10: 2
PAINLEVEL_OUTOF10: 2

## 2025-07-09 ASSESSMENT — PAIN DESCRIPTION - LOCATION
LOCATION: BACK;GROIN
LOCATION: GENERALIZED

## 2025-07-09 ASSESSMENT — PAIN - FUNCTIONAL ASSESSMENT
PAIN_FUNCTIONAL_ASSESSMENT: ACTIVITIES ARE NOT PREVENTED
PAIN_FUNCTIONAL_ASSESSMENT: ACTIVITIES ARE NOT PREVENTED

## 2025-07-09 ASSESSMENT — PAIN DESCRIPTION - DESCRIPTORS
DESCRIPTORS: ACHING
DESCRIPTORS: SORE

## 2025-07-09 NOTE — ANESTHESIA PROCEDURE NOTES
Arterial Line:    An arterial line was placed using ultrasound guidance, in the pre-op for the following indication(s): continuous blood pressure monitoring and blood sampling needed.    A 20 gauge (size), 1 and 3/8 inch (length), Arrow (type) catheter was placed, Seldinger technique used, into the left radial artery, secured by tape and Tegaderm.  Anesthesia type: Local  Local infiltration: Injection    Events:  patient tolerated procedure well with no complications.  Performed: Anesthesiologist   Preanesthetic Checklist  Completed: patient identified, IV checked, site marked, risks and benefits discussed, surgical/procedural consents, equipment checked, pre-op evaluation, timeout performed, anesthesia consent given, oxygen available and monitors applied/VS acknowledged

## 2025-07-09 NOTE — ANESTHESIA PRE PROCEDURE
Date/Time     07/09/2025 04:03 AM    K 3.5 07/09/2025 04:03 AM     07/09/2025 04:03 AM    CO2 30 07/09/2025 04:03 AM    BUN 20 07/09/2025 04:03 AM    CREATININE 1.00 07/09/2025 04:03 AM    GFRAA 49 09/26/2022 08:08 AM    AGRATIO 1.1 09/26/2022 08:08 AM    LABGLOM 56 07/09/2025 04:03 AM    LABGLOM 46 03/08/2024 01:54 PM    GLUCOSE 100 07/09/2025 04:03 AM    CALCIUM 9.5 07/09/2025 04:03 AM    BILITOT 1.0 06/28/2025 03:58 AM    ALKPHOS 99 06/28/2025 03:58 AM    ALKPHOS 94 09/26/2022 08:08 AM    AST 19 06/28/2025 03:58 AM    ALT 24 06/28/2025 03:58 AM       POC Tests: No results for input(s): \"POCGLU\", \"POCNA\", \"POCK\", \"POCCL\", \"POCBUN\", \"POCHEMO\", \"POCHCT\" in the last 72 hours.    Coags:   Lab Results   Component Value Date/Time    PROTIME 11.6 12/21/2023 04:16 AM    INR 1.1 12/21/2023 04:16 AM    APTT 25.9 12/21/2023 04:16 AM       HCG (If Applicable): No results found for: \"PREGTESTUR\", \"PREGSERUM\", \"HCG\", \"HCGQUANT\"     ABGs: No results found for: \"PHART\", \"PO2ART\", \"YNX1ZAP\", \"CUD1OCP\", \"BEART\", \"A2CDQBZE\"     Type & Screen (If Applicable):  No results found for: \"LABABO\"    Drug/Infectious Status (If Applicable):  No results found for: \"HIV\", \"HEPCAB\"    COVID-19 Screening (If Applicable):   Lab Results   Component Value Date/Time    COVID19 Not detected 12/20/2023 11:51 PM           Anesthesia Evaluation  Patient summary reviewed and Nursing notes reviewed  Airway: Mallampati: II  TM distance: >3 FB   Neck ROM: full  Mouth opening: > = 3 FB   Dental:    (+) partials      Pulmonary: breath sounds clear to auscultation  (+)     sleep apnea: on CPAP,                                  Cardiovascular:    (+) hypertension:, valvular problems/murmurs: AS, dysrhythmias: atrial fibrillation, murmur, Aortic, hyperlipidemia      ECG reviewed  Rhythm: regular  Rate: normal  Echocardiogram reviewed         Beta Blocker:  Dose within 24 Hrs      ROS comment: TTE:   Left Ventricle: Normal left ventricular systolic  normal (ped)...

## 2025-07-09 NOTE — CARE COORDINATION
Transition of Care Plan:    RUR: 9% \"low risk\"  Prior Level of Functioning: Independent with ADL's   Disposition: Home with family support   JOHN: 7/10  If SNF or IPR: Date FOC offered: N/A  Follow up appointments: PCP/Specialists as indicated  DME needed: None   Transportation at discharge: Family to transport   IM/IMM Medicare/ letter given: 2nd IM needed prior to d/c   Is patient a  and connected with [facility]? No  Caregiver Contact: Karen Durán (child), [phone]  Discharge Caregiver contacted prior to discharge? To be contacted  Care Conference needed? Not at this time   Barriers to discharge: Cardiology, TAVR     0955 AM: Chart reviewed. CM following for d/c planning. Pt to go for TAVR today. CM to continue to follow should CM needs arise.    THOMAS Yeager  Care Management  [facility]   [phone]

## 2025-07-10 ENCOUNTER — APPOINTMENT (OUTPATIENT)
Facility: HOSPITAL | Age: 80
DRG: 267 | End: 2025-07-10
Payer: MEDICARE

## 2025-07-10 VITALS
TEMPERATURE: 97.6 F | OXYGEN SATURATION: 86 % | DIASTOLIC BLOOD PRESSURE: 80 MMHG | SYSTOLIC BLOOD PRESSURE: 131 MMHG | BODY MASS INDEX: 27.02 KG/M2 | HEART RATE: 76 BPM | HEIGHT: 62 IN | WEIGHT: 146.8 LBS | RESPIRATION RATE: 20 BRPM

## 2025-07-10 LAB
ANION GAP SERPL CALC-SCNC: 6 MMOL/L (ref 2–12)
BASOPHILS # BLD: 0.02 K/UL (ref 0–0.1)
BASOPHILS NFR BLD: 0.3 % (ref 0–1)
BUN SERPL-MCNC: 16 MG/DL (ref 6–20)
BUN/CREAT SERPL: 16 (ref 12–20)
CALCIUM SERPL-MCNC: 9 MG/DL (ref 8.5–10.1)
CHLORIDE SERPL-SCNC: 103 MMOL/L (ref 97–108)
CO2 SERPL-SCNC: 27 MMOL/L (ref 21–32)
CREAT SERPL-MCNC: 0.97 MG/DL (ref 0.55–1.02)
DIFFERENTIAL METHOD BLD: ABNORMAL
ECHO AO ASC DIAM: 2.2 CM
ECHO AO ASCENDING AORTA INDEX: 1.32 CM/M2
ECHO AO ROOT DIAM: 2.8 CM
ECHO AO ROOT INDEX: 1.68 CM/M2
ECHO AV AREA PEAK VELOCITY: 1.2 CM2
ECHO AV AREA VTI: 1.5 CM2
ECHO AV AREA/BSA PEAK VELOCITY: 0.7 CM2/M2
ECHO AV AREA/BSA VTI: 0.9 CM2/M2
ECHO AV CUSP MM: 1 CM
ECHO AV MEAN GRADIENT: 13 MMHG
ECHO AV MEAN VELOCITY: 1.7 M/S
ECHO AV PEAK GRADIENT: 26 MMHG
ECHO AV PEAK VELOCITY: 2.5 M/S
ECHO AV VELOCITY RATIO: 0.36
ECHO AV VTI: 45.5 CM
ECHO BSA: 1.71 M2
ECHO EST RA PRESSURE: 10 MMHG
ECHO LA DIAMETER INDEX: 2.51 CM/M2
ECHO LA DIAMETER: 4.2 CM
ECHO LA TO AORTIC ROOT RATIO: 1.5
ECHO LA VOL A-L A2C: 67 ML (ref 22–52)
ECHO LA VOL A-L A4C: 66 ML (ref 22–52)
ECHO LA VOL MOD A2C: 67 ML (ref 22–52)
ECHO LA VOL MOD A4C: 61 ML (ref 22–52)
ECHO LA VOLUME AREA LENGTH: 71 ML
ECHO LA VOLUME INDEX A-L A2C: 40 ML/M2 (ref 16–34)
ECHO LA VOLUME INDEX A-L A4C: 40 ML/M2 (ref 16–34)
ECHO LA VOLUME INDEX AREA LENGTH: 43 ML/M2 (ref 16–34)
ECHO LA VOLUME INDEX MOD A2C: 40 ML/M2 (ref 16–34)
ECHO LA VOLUME INDEX MOD A4C: 37 ML/M2 (ref 16–34)
ECHO LV E' LATERAL VELOCITY: 9.28 CM/S
ECHO LV E' SEPTAL VELOCITY: 7.35 CM/S
ECHO LV EF PHYSICIAN: 55 %
ECHO LV FRACTIONAL SHORTENING: 24 % (ref 28–44)
ECHO LV INTERNAL DIMENSION DIASTOLE INDEX: 2.51 CM/M2
ECHO LV INTERNAL DIMENSION DIASTOLIC: 4.2 CM (ref 3.9–5.3)
ECHO LV INTERNAL DIMENSION SYSTOLIC INDEX: 1.92 CM/M2
ECHO LV INTERNAL DIMENSION SYSTOLIC: 3.2 CM
ECHO LV IVSD: 1.2 CM (ref 0.6–0.9)
ECHO LV MASS 2D: 147 G (ref 67–162)
ECHO LV MASS INDEX 2D: 88 G/M2 (ref 43–95)
ECHO LV POSTERIOR WALL DIASTOLIC: 0.9 CM (ref 0.6–0.9)
ECHO LV RELATIVE WALL THICKNESS RATIO: 0.43
ECHO LVOT AREA: 3.5 CM2
ECHO LVOT AV VTI INDEX: 0.45
ECHO LVOT DIAM: 2.1 CM
ECHO LVOT MEAN GRADIENT: 2 MMHG
ECHO LVOT PEAK GRADIENT: 3 MMHG
ECHO LVOT PEAK VELOCITY: 0.9 M/S
ECHO LVOT STROKE VOLUME INDEX: 42.7 ML/M2
ECHO LVOT SV: 71.3 ML
ECHO LVOT VTI: 20.6 CM
ECHO MV A VELOCITY: 0.83 M/S
ECHO MV E DECELERATION TIME (DT): 262.6 MS
ECHO MV E VELOCITY: 1.34 M/S
ECHO MV E/A RATIO: 1.61
ECHO MV E/E' LATERAL: 14.44
ECHO MV E/E' RATIO (AVERAGED): 16.34
ECHO MV E/E' SEPTAL: 18.23
ECHO PV MAX VELOCITY: 1.1 M/S
ECHO PV PEAK GRADIENT: 5 MMHG
ECHO RIGHT VENTRICULAR SYSTOLIC PRESSURE (RVSP): 23 MMHG
ECHO RV FREE WALL PEAK S': 8.6 CM/S
ECHO RV INTERNAL DIMENSION: 2.7 CM
ECHO TV REGURGITANT MAX VELOCITY: 1.81 M/S
ECHO TV REGURGITANT PEAK GRADIENT: 14 MMHG
EOSINOPHIL # BLD: 0.01 K/UL (ref 0–0.4)
EOSINOPHIL NFR BLD: 0.1 % (ref 0–7)
ERYTHROCYTE [DISTWIDTH] IN BLOOD BY AUTOMATED COUNT: 13.6 % (ref 11.5–14.5)
GLUCOSE SERPL-MCNC: 149 MG/DL (ref 65–100)
HCT VFR BLD AUTO: 37.6 % (ref 35–47)
HGB BLD-MCNC: 12.1 G/DL (ref 11.5–16)
IMM GRANULOCYTES # BLD AUTO: 0.02 K/UL (ref 0–0.04)
IMM GRANULOCYTES NFR BLD AUTO: 0.3 % (ref 0–0.5)
LYMPHOCYTES # BLD: 0.69 K/UL (ref 0.8–3.5)
LYMPHOCYTES NFR BLD: 9.3 % (ref 12–49)
MAGNESIUM SERPL-MCNC: 2 MG/DL (ref 1.6–2.4)
MCH RBC QN AUTO: 28.5 PG (ref 26–34)
MCHC RBC AUTO-ENTMCNC: 32.2 G/DL (ref 30–36.5)
MCV RBC AUTO: 88.5 FL (ref 80–99)
MONOCYTES # BLD: 0.5 K/UL (ref 0–1)
MONOCYTES NFR BLD: 6.7 % (ref 5–13)
NEUTS SEG # BLD: 6.16 K/UL (ref 1.8–8)
NEUTS SEG NFR BLD: 83.3 % (ref 32–75)
NRBC # BLD: 0 K/UL (ref 0–0.01)
NRBC BLD-RTO: 0 PER 100 WBC
PHOSPHATE SERPL-MCNC: 3.6 MG/DL (ref 2.6–4.7)
PLATELET # BLD AUTO: 190 K/UL (ref 150–400)
PMV BLD AUTO: 10.8 FL (ref 8.9–12.9)
POTASSIUM SERPL-SCNC: 3.7 MMOL/L (ref 3.5–5.1)
RBC # BLD AUTO: 4.25 M/UL (ref 3.8–5.2)
RBC MORPH BLD: ABNORMAL
SODIUM SERPL-SCNC: 136 MMOL/L (ref 136–145)
WBC # BLD AUTO: 7.4 K/UL (ref 3.6–11)

## 2025-07-10 PROCEDURE — 6370000000 HC RX 637 (ALT 250 FOR IP): Performed by: INTERNAL MEDICINE

## 2025-07-10 PROCEDURE — 36415 COLL VENOUS BLD VENIPUNCTURE: CPT

## 2025-07-10 PROCEDURE — 83735 ASSAY OF MAGNESIUM: CPT

## 2025-07-10 PROCEDURE — 84100 ASSAY OF PHOSPHORUS: CPT

## 2025-07-10 PROCEDURE — 6370000000 HC RX 637 (ALT 250 FOR IP): Performed by: STUDENT IN AN ORGANIZED HEALTH CARE EDUCATION/TRAINING PROGRAM

## 2025-07-10 PROCEDURE — 80048 BASIC METABOLIC PNL TOTAL CA: CPT

## 2025-07-10 PROCEDURE — 6370000000 HC RX 637 (ALT 250 FOR IP)

## 2025-07-10 PROCEDURE — 85025 COMPLETE CBC W/AUTO DIFF WBC: CPT

## 2025-07-10 PROCEDURE — 2500000003 HC RX 250 WO HCPCS: Performed by: INTERNAL MEDICINE

## 2025-07-10 PROCEDURE — 93005 ELECTROCARDIOGRAM TRACING: CPT

## 2025-07-10 PROCEDURE — 71045 X-RAY EXAM CHEST 1 VIEW: CPT

## 2025-07-10 RX ORDER — OMEPRAZOLE 40 MG/1
40 CAPSULE, DELAYED RELEASE ORAL DAILY
Qty: 30 CAPSULE | Refills: 0 | Status: SHIPPED
Start: 2025-07-10

## 2025-07-10 RX ORDER — POTASSIUM CHLORIDE 1500 MG/1
40 TABLET, EXTENDED RELEASE ORAL ONCE
Status: COMPLETED | OUTPATIENT
Start: 2025-07-10 | End: 2025-07-10

## 2025-07-10 RX ADMIN — LEVOTHYROXINE SODIUM 88 MCG: 0.09 TABLET ORAL at 05:20

## 2025-07-10 RX ADMIN — Medication 2000 UNITS: at 09:14

## 2025-07-10 RX ADMIN — POLYETHYLENE GLYCOL 3350 17 G: 17 POWDER, FOR SOLUTION ORAL at 09:14

## 2025-07-10 RX ADMIN — POTASSIUM CHLORIDE 40 MEQ: 1500 TABLET, EXTENDED RELEASE ORAL at 11:04

## 2025-07-10 RX ADMIN — DILTIAZEM HYDROCHLORIDE 120 MG: 120 CAPSULE, EXTENDED RELEASE ORAL at 09:13

## 2025-07-10 RX ADMIN — AMLODIPINE BESYLATE 5 MG: 5 TABLET ORAL at 09:14

## 2025-07-10 RX ADMIN — SODIUM CHLORIDE, PRESERVATIVE FREE 10 ML: 5 INJECTION INTRAVENOUS at 09:15

## 2025-07-10 RX ADMIN — PANTOPRAZOLE SODIUM 40 MG: 40 TABLET, DELAYED RELEASE ORAL at 05:20

## 2025-07-10 RX ADMIN — ASPIRIN 81 MG: 81 TABLET, CHEWABLE ORAL at 09:13

## 2025-07-10 NOTE — PROGRESS NOTES
EP/ ARRHYTHMIA Progress Note    Patient ID:  Patient: Roxanna Schuster  MRN: [id number]  Age: 80 y.o.  : 1945    Date of  Admission: 2025  6:18 PM   PCP:  Mitchell Gruber MD  Usual cardiologist:  Austin Zimmer MD    Assessment:   Rare sinus slowing with junctional escape which may suggest early sinus node dysfunction.  See media section for photo of tele.  History of atrial fibrillation with prior catheter RF ablations in , .  The last ablation in 2023 involved pulmonary vein isolation, posterior and anterior wall isolation, and a CTI line.  HER ECG ON ADMISSION SHOWS SINUS WITH PAC'S, NOT ATRIAL FIBRILLATION.  HER TELE HERE HAS HOT SHOWN ATRIAL FIBRILLATION.  Watchman implant in , transitioned to aspirin.  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  History of GI bleeding in .  Full code.    Plan:     No appreciated atrial fibrillation, not a cause of her recent symptoms.  Changes her STS calculation only minimally.  Normal DC at baseline with some mild 1st degree AV block at higher HR's (atrial tachycardia).  No bundle branch block, normal axis.  Not at high risk for COMPLETE HEART BLOCK with TAVR based on preexisting conduction disease.  Reviewing her case, may end up needing a pacemaker ultimately for sinus node dysfunction but not urgent.  Dr. Zimmer to see early next week.     update:  No change to recommendations.  Dr. Zimmer to see this week.      [x]       High complexity decision making was performed in this patient at high risk for decompensation with multiple organ involvement.    Roxanna Schuster is a 80 y.o. female with a history of the above.  I was asked to see her regarding her atrial fibrillation.  She has had work done in the past for atrial fibrillation.  This arrhythmia is not responsible for her symptoms.  She is under evaluation for TAVR to be placed in a prior porcine 
      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 Dr Pedro following, having computer issues preventing review of records  Continue to wean off oxygen  Patient felt weak today - she had been placed on o2 for comfort.   Patient would like meds to beds on discharge     History of pAF s/p ablation and watchman    Secondary hypercoagulable state due to atrial fibrillation  History of SVT  History of HTN  History of CKD  Continue home aspirin, statin, diltiazem, lasix      CHAU  Continue home bipap/CPAP     HLD  Continue statin as above     Medical Decision Making:   I personally reviewed labs:cbc , bmp   I personally reviewed imaging: Chest x-ray on 2025-no acute cardiopulmonary process  I personally reviewed EKG: EKG done on 2025-atrial fibrillation heart rate of 82 beats minute  Toxic drug monitoring: Multiple bleeding on enoxaparin, daily CBC  Discussed case with: Patient, RN    Code Status: Full code  DVT Prophylaxis: enoxa   GI Prophylaxis: Protonix      Karen Gonzalez (Child)  [phone] (Mobile)        Subjective:     Chief Complaint / Reason for Physician Visit  Patient currently admitted and treated for bioprosthetic valve failure.  She also complained of dyspnea on exertion.    Labs and chart reviewed patient examined, overnight events noted.  Patient appeared to be comfortable 
      Hospitalist Progress Note    NAME:   Roxanna Schuster   : 1945   MRN: [id number]     Date/Time: 2025 6:47 PM  Patient PCP: Mitchell Gruber MD    Estimated discharge date:   Barriers: Cardiology plans regarding valve, dyspnea improvement      Assessment / Plan:    Bioprosthetic valve failure  Dyspnea on exertion, fatigue   Aortic valve stenosis s/p bioprosthetic valve ( - )   Increased gradients by echo after replacement, cath in  with lower gradient; now with severe gradient and has become increasingly symptomatic, worse over past week, concerning for valve failure   - Saw Cardiology today, noted EKG with ?new ST depressions, she currently feels tired but no chest pain  - Troponin negative x1, there is mechanical failure in lab currently, unable to run another troponin at this time   - Admit to medicine  - Continuous telemetry   - awaiting recommendations from [facility] - appears plan was for TAVR CTA as outpatient  - [facility] to discuss with TAVR team. Dr Pedro following, having computer issues preventing review of records     History of pAF s/p ablation and watchman    History of SVT  History of HTN  History of CKD  - Continue home aspirin, statin, diltiazem, lasix      CHAU  - Continue home bipap/CPAP     HLD  - Statin as above       Code Status: Full  DVT Prophylaxis: lovenox  Contact:    Karen Gonzalez (Child)  [phone] (Mobile)       Medical Decision Making:    [] High (any 2)     A. Problems (any 1)  [] Acute/Chronic Illness/injury posing threat to life or bodily function:    [] Severe exacerbation of chronic illness:    ---------------------------------------------------------------------  B. Risk of Treatment (any 1)   [] Drugs/treatments that require intensive monitoring for toxicity include:    [] IV ABX requiring serial renal monitoring for nephrotoxicity:     [] IV Narcotic analgesia for adverse drug reaction  [] Aggressive IV diuresis requiring serial 
      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  Strict I's and O's, daily weights and low-salt diet  - Cardiology and CT surgery are following and appreciate recommendations  - For surgical procedure Monday         pAF s/p ablation and watchman    Secondary hypercoagulable state due to atrial fibrillation  .  Sinus slowing with junctional escape  -Continue Cardizem.  Not on anticoagulation currently  -Dr. Campoverde following and he will consider pacemaker placement    Hypertension  Dyslipidemia  Hypothyroidism  Obstructive sleep apnea  CKD stage III  GERD  Osteoarthritis  History of CVA 5 days post AVR  - Continue PTA Norvasc, Lipitor, levothyroxine, PPI  - Creatinine is close to baseline        Medical Decision Making:   I personally reviewed labs:cbc , bmp   I personally reviewed imaging:  I personally reviewed EKG: Telemetry  Toxic drug monitoring:  Discussed case with: Pharmacy    Code Status: Full code  DVT Prophylaxis: Lovenox  GI Prophylaxis: Protonix      Karen Gonzalez (Child)  [phone] (Mobile)        Subjective:     Chief Complaint / Reason for Physician Visit  Still having occasional episodes of dizziness        Objective:     VITALS:   Last 24hrs 
      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  Strict I's and O's, daily weights and low-salt diet  - Cardiology and CT surgery are following and appreciate recommendations  - Dr. Zimmer will come tomorrow and decide on further plan    Hypokalemia  - Potassium replaced.  Will restart her home potassium supplementation         pAF s/p ablation and watchman    Secondary hypercoagulable state due to atrial fibrillation  .  Sinus slowing with junctional escape  -Continue Cardizem.  Not on anticoagulation currently  -Dr. Campoverde following and he will consider pacemaker placement    Hypertension  Dyslipidemia  Hypothyroidism  Obstructive sleep apnea  CKD stage III  GERD  Osteoarthritis  History of CVA 5 days post AVR  - Continue PTA Norvasc, Lipitor, levothyroxine, PPI  - Creatinine is close to baseline        Medical Decision Making:   I personally reviewed labs:cbc , bmp   I personally reviewed imaging:  I personally reviewed EKG: Telemetry  Toxic drug monitoring:  Discussed case with: Pharmacy    Code Status: Full code  DVT Prophylaxis: Lovenox  GI Prophylaxis: Protonix      Karen Gonzalez (Child)  [phone] (Mobile)        Subjective:     Chief Complaint / 
      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  Strict I's and O's, daily weights and low-salt diet  - Cardiology and CT surgery are following  - Check labs in a.m. tomorrow         pAF s/p ablation and watchman    Secondary hypercoagulable state due to atrial fibrillation  -Continue Cardizem.  Not on anticoagulation currently    Hypertension  Dyslipidemia  Hypothyroidism  Obstructive sleep apnea  CKD stage III  GERD  Osteoarthritis  History of CVA 5 days post AVR  - Continue PTA Norvasc, Lipitor, levothyroxine, PPI  - Creatinine is close to baseline        Medical Decision Making:   I personally reviewed labs:cbc , bmp   I personally reviewed imaging: Chest x-ray  I personally reviewed EKG: Telemetry  Toxic drug monitoring:  Discussed case with: Pharmacy    Code Status: Full code  DVT Prophylaxis: enoxa   GI Prophylaxis: Protonix      Karen Gonzalez (Child)  [phone] (Mobile)        Subjective:     Chief Complaint / Reason for Physician Visit  Reports shortness of breath along with some cough  Plan cardiac cath today  Overnight events noted      Objective:     VITALS:   Last 24hrs VS reviewed since prior progress note. Most recent are:  Patient Vitals for the past 24 hrs:   BP Temp Temp src Pulse Resp 
      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  Strict I's and O's, daily weights and low-salt diet  - Cardiology and CT surgery are following and appreciate recommendations  - Check labs in a.m. tomorrow         pAF s/p ablation and watchman    Secondary hypercoagulable state due to atrial fibrillation  -Continue Cardizem.  Not on anticoagulation currently    Hypertension  Dyslipidemia  Hypothyroidism  Obstructive sleep apnea  CKD stage III  GERD  Osteoarthritis  History of CVA 5 days post AVR  - Continue PTA Norvasc, Lipitor, levothyroxine, PPI  - Creatinine is close to baseline        Medical Decision Making:   I personally reviewed labs:cbc , bmp   I personally reviewed imaging:  I personally reviewed EKG: Telemetry  Toxic drug monitoring:  Discussed case with: Pharmacy    Code Status: Full code  DVT Prophylaxis: Lovenox  GI Prophylaxis: Protonix      Karen Gonzalez (Child)  [phone] (Mobile)        Subjective:     Chief Complaint / Reason for Physician Visit  She still reports having episodes of near syncope and dizziness  Reports some shortness of breath but no chest pain  Overnight events noted      Objective:     VITALS:   Last 24hrs VS reviewed since 
      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  Strict I's and O's, daily weights and low-salt diet  - Cardiology and CT surgery are following and appreciate recommendations  - Check labs in a.m. tomorrow         pAF s/p ablation and watchman    Secondary hypercoagulable state due to atrial fibrillation  .  Sinus slowing with junctional escape  -Continue Cardizem.  Not on anticoagulation currently  -Dr. Campoverde following and he will consider pacemaker placement    Hypertension  Dyslipidemia  Hypothyroidism  Obstructive sleep apnea  CKD stage III  GERD  Osteoarthritis  History of CVA 5 days post AVR  - Continue PTA Norvasc, Lipitor, levothyroxine, PPI  - Creatinine is close to baseline        Medical Decision Making:   I personally reviewed labs:cbc , bmp   I personally reviewed imaging:  I personally reviewed EKG: Telemetry  Toxic drug monitoring:  Discussed case with: Pharmacy    Code Status: Full code  DVT Prophylaxis: Lovenox  GI Prophylaxis: Protonix      Karen Gonzalez (Child)  [phone] (Mobile)        Subjective:     Chief Complaint / Reason for Physician Visit  Still having occasional episodes of dizziness        Objective:     VITALS:   Last 24hrs VS 
      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  Strict I's and O's, daily weights and low-salt diet  -Check labs in a.m. tomorrow      Hypokalemia  - Potassium is normal today         pAF s/p ablation and watchman    Secondary hypercoagulable state due to atrial fibrillation  Sinus slowing with junctional escape  -Continue Cardizem.  Not on anticoagulation currently  -Dr. Campoverde following and he will consider pacemaker placement but not urgent    Hypertension  Dyslipidemia  Hypothyroidism  Obstructive sleep apnea  CKD stage III  GERD  Osteoarthritis  History of CVA 5 days post AVR  - Continue PTA Norvasc, Lipitor, levothyroxine, PPI  - Creatinine is close to baseline        Medical Decision Making:   I personally reviewed labs:cbc , bmp   I personally reviewed imaging:  I personally reviewed EKG: Telemetry  Toxic drug monitoring:  Discussed case with: Pharmacy    Code Status: Full code  DVT Prophylaxis: Lovenox  GI Prophylaxis: Protonix      LisaKaren (Child)  [phone] (Mobile)        Subjective:     Chief Complaint / Reason for Physician Visit  She reports some indigestion-like symptoms today.  Does not report any chest pain or shortness of breath  Overnight events noted          Objective:     VITALS: 
      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  Not on anticoagulation currently  -Dr. Campoverde following and he will consider pacemaker placement but not urgent    Hypertension  Dyslipidemia  Hypothyroidism  Obstructive sleep apnea  CKD stage III  GERD  Osteoarthritis  History of CVA 5 days post AVR  - Continue PTA Norvasc, Lipitor, levothyroxine, PPI  - Creatinine is close to baseline    Code Status: Full code  DVT Prophylaxis: Lovenox  GI Prophylaxis: Protonix      JamaicaKaren verdugo (Child)  [phone] (Mobile)        Medical Decision Making:    [x] High (any 2)     A. Problems (any 1)  [x] Acute/Chronic Illness/injury posing threat to life or bodily function:  severe aortic stenosis  [] Severe exacerbation of chronic illness:    ---------------------------------------------------------------------  B. Risk of Treatment (any 1)   [] Drugs/treatments that require intensive monitoring for toxicity include:    [] IV ABX requiring serial renal monitoring for nephrotoxicity:     [] IV Narcotic analgesia for adverse drug reaction  [] Aggressive IV 
      Hospitalist Progress Note    NAME:   Roxanna Schuster   : 1945   MRN: [id number]     Date/Time: 2025 5:09 PM  Patient PCP: Mitchell Gruber MD    Estimated discharge date:   Barriers: Cardiology evaluation, dyspnea improvement      Assessment / Plan:    Bioprosthetic valve failure  Dyspnea on exertion, fatigue   Aortic valve stenosis s/p bioprosthetic valve ( - )   Increased gradients by echo after replacement, cath in  with lower gradient; now with severe gradient and has become increasingly symptomatic, worse over past week, concerning for valve failure   - Saw Cardiology today, noted EKG with ?new ST depressions, she currently feels tired but no chest pain  - Troponin negative x1, there is mechanical failure in lab currently, unable to run another troponin at this time   - Admit to medicine  - Continuous telemetry   - awaiting recommendations from [facility] - appears plan was for TAVR CTA as outpatient     History of pAF s/p ablation and watchman    History of SVT  History of HTN  History of CKD  - Continue home aspirin, statin, diltiazem, lasix      CHAU  - Continue home bipap/CPAP     HLD  - Statin as above       Code Status: Full  DVT Prophylaxis: lovenox  Contact:    Karen Gonzalez (Child)  [phone] (Mobile)       Medical Decision Making:    [] High (any 2)     A. Problems (any 1)  [] Acute/Chronic Illness/injury posing threat to life or bodily function:    [] Severe exacerbation of chronic illness:    ---------------------------------------------------------------------  B. Risk of Treatment (any 1)   [] Drugs/treatments that require intensive monitoring for toxicity include:    [] IV ABX requiring serial renal monitoring for nephrotoxicity:     [] IV Narcotic analgesia for adverse drug reaction  [] Aggressive IV diuresis requiring serial monitoring for renal impairment and electrolyte derangements  [] Critical electrolyte abnormalities requiring IV replacement and 
    [facility]     Progress Note      7/10/2025 8:44 AM  NAME: Roxanna Schuster   MRN:  [id number]   Admit Diagnosis: NGUYỄN (dyspnea on exertion) [R06.09]  Acute chest pain [R07.9]  Aortic valve stenosis, etiology of cardiac valve disease unspecified [I35.0]  Atrial fibrillation, unspecified type (HCC) [I48.91]  Chest pain, unspecified type [R07.9]  Symptomatic severe aortic stenosis with normal ejection fraction [I35.0]     Problem List:     - Cath in 2009 mild cad, Calcium score 2024 of 1200, Cath 7/2025 angiographically normal coronaries, PA 36/15  - s/p Bioprosthetic AVR in 2009 with Dr. rodriguez Mosaic Ultra [id number]  - Possible PPM with increased gradients immediately post op 2009 it prerna 2.9 with mean of 18, 2010 peak of 3.9 with mean of 29, 2012 peak of 4.2, mean of 35, 20-16 peak 4.6 mean of 44, 2018 peak of 4.6, mean 44, 2021 peak of 3.7, mean of 32, 2022 peak of 3.8 mean of 33, 2025 normal EF, peak of 4.4, mean of 52, mild MR, PAS = 49, s/p YINKA TAVR with valve fracture   - SVT, afib, s/p ablation currently in sinus, watchman in 3/2024 Dr. Campoverde  - Sinus pr of 188, QRS 98, migdalia beta blocker stopped  - HTN  - Dyslipidemia  - CKD IIIa cr of 1.4  - Carotid in 2025 mild  - Diverticulosis, hx of GI bleed  - Hx of TIA  - CHAU on bipap  - Regular dental visit every 6 months  - No tobacco, no etoh , no drugs  , live alone, 2 daughters, retired , ADLs, chair yoga 3 x week, water aerobics          Assessment/Plan:     s/p Bioprosthetic valve in 2009 Mosaic 19    Increased gradients by echo shortly thereafter in severe range by [id number]  Cath in 2012 with lower gradient.    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 TAVR.   Patient wishes to proceed with TAVR evaluation.  Discussed all risk/benefits/alternatives.  Including risk of death, MI, stroke, renal 
    [facility]     Progress Note      7/6/2025 5:12 PM  NAME: Roxanna Schuster   MRN:  [id number]   Admit Diagnosis: NGUYỄN (dyspnea on exertion) [R06.09]  Acute chest pain [R07.9]  Aortic valve stenosis, etiology of cardiac valve disease unspecified [I35.0]  Atrial fibrillation, unspecified type (HCC) [I48.91]  Chest pain, unspecified type [R07.9]  Symptomatic severe aortic stenosis with normal ejection fraction [I35.0]     Problem List:     - Cath in 2009 mild cad, Calcium score 2024 of 1200, Cath 7/2025 angiographically normal coronaries, PA 36/15  - s/p Bioprosthetic AVR in 2009 with Dr. rodriguez Mosaic Ultra [id number]  - Possible PPM with increased gradients immediately post op 2009 it prerna 2.9 with mean of 18, 2010 peak of 3.9 with mean of 29, 2012 peak of 4.2, mean of 35, 20-16 peak 4.6 mean of 44, 2018 peak of 4.6, mean 44, 2021 peak of 3.7, mean of 32, 2022 peak of 3.8 mean of 33, 2025 normal EF, peak of 4.4, mean of 52, mild MR, PAS = 49  - SVT, afib, s/p ablation currently in sinus, watchman in 3/2024 Dr. Campoverde  - Sinus pr of 188, QRS 98, migdalia beta blocker stopped  - HTN  - Dyslipidemia  - CKD IIIa cr of 1.4  - Carotid in 2025 mild  - Diverticulosis, hx of GI bleed  - Hx of TIA  - CHAU on bipap  - Regular dental visit every 6 months  - No tobacco, no etoh , no drugs  , live alone, 2 daughters, retired , ADLs, chair yoga 3 x week, water aerobics          Assessment/Plan:     s/p Bioprosthetic valve in 2009 Mosaic 19    Increased gradients by echo shortly thereafter in severe range by 6420-4235  Cath in 2012 with lower gradient.    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 TAVR.   Patient wishes to proceed with TAVR evaluation.  Discussed all risk/benefits/alternatives.  Including risk of death, MI, stroke, renal complications, bleeding complication, 
    [facility]     Progress Note      7/8/2025 6:41 AM  NAME: Roxanna Schuster   MRN:  [id number]   Admit Diagnosis: NGUYỄN (dyspnea on exertion) [R06.09]  Acute chest pain [R07.9]  Aortic valve stenosis, etiology of cardiac valve disease unspecified [I35.0]  Atrial fibrillation, unspecified type (HCC) [I48.91]  Chest pain, unspecified type [R07.9]  Symptomatic severe aortic stenosis with normal ejection fraction [I35.0]     Problem List:     - Cath in 2009 mild cad, Calcium score 2024 of 1200, Cath 7/2025 angiographically normal coronaries, PA 36/15  - s/p Bioprosthetic AVR in 2009 with Dr. rodriguez Mosaic Ultra [id number]  - Possible PPM with increased gradients immediately post op 2009 it prerna 2.9 with mean of 18, 2010 peak of 3.9 with mean of 29, 2012 peak of 4.2, mean of 35, 20-16 peak 4.6 mean of 44, 2018 peak of 4.6, mean 44, 2021 peak of 3.7, mean of 32, 2022 peak of 3.8 mean of 33, 2025 normal EF, peak of 4.4, mean of 52, mild MR, PAS = 49  - SVT, afib, s/p ablation currently in sinus, watchman in 3/2024 Dr. Campoverde  - Sinus pr of 188, QRS 98, migdalia beta blocker stopped  - HTN  - Dyslipidemia  - CKD IIIa cr of 1.4  - Carotid in 2025 mild  - Diverticulosis, hx of GI bleed  - Hx of TIA  - CHAU on bipap  - Regular dental visit every 6 months  - No tobacco, no etoh , no drugs  , live alone, 2 daughters, retired , ADLs, chair yoga 3 x week, water aerobics          Assessment/Plan:     s/p Bioprosthetic valve in 2009 Mosaic 19    Increased gradients by echo shortly thereafter in severe range by [id number]  Cath in 2012 with lower gradient.    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 TAVR.   Patient wishes to proceed with TAVR evaluation.  Discussed all risk/benefits/alternatives.  Including risk of death, MI, stroke, renal complications, bleeding complication, 
  Physician Progress Note      PATIENT:               ARIEL PARDO  CSN #:                  [id number]  :                       1945  ADMIT DATE:       2025 6:18 PM  DISCH DATE:  RESPONDING  PROVIDER #:        David L Mendel MD          QUERY TEXT:    CKD is documented in the medical record  H/P by Dr. Murphy MIKE. Please   specify the disease stage:    The clinical indicators include:   H/P by Dr. Murphy MIKE: \"History of CKD  - Continue home aspirin, statin, diltiazem, lasix\"    Labs: -  eGFR 40, 61, 50, 62    Treatment/Orders/MAR:  - lab monitoring  Options provided:  -- Chronic kidney disease Stage 3, unspecified  -- Other - I will add my own diagnosis  -- Disagree - Not applicable / Not valid  -- Disagree - Clinically unable to determine / Unknown  -- Refer to Clinical Documentation Reviewer    PROVIDER RESPONSE TEXT:    The patient has chronic kidney disease Stage 3.    Query created by: Ranke, Jordan on 2025 1:11 PM      Electronically signed by:  David L Mendel MD 2025 2:06 PM          
  Physician Progress Note      PATIENT:               ARIEL PARDO  [facility] #:                  [id number]  :                       1945  ADMIT DATE:       2025 6:18 PM  DISCH DATE:  RESPONDING  PROVIDER #:        Genaro Gimenez MD          QUERY TEXT:    Acute on chronic CHF is documented in the medical record on  by   cardiothoracic surgery KENN Michael NP.  Please provide additional clinical   indicators supportive of your documentation. Or please document if the   diagnosis of acute on chronic CHF has been ruled out after study.    The clinical indicators include:   by cardiothoracic surgery KENN Michael NP: \"Acute on chronic HFpEF, NYHA   class IV, LVEF 60-65%, stable: Diuresis per Cardiology for now. Blood pressure   and risk factor control. Strict I&O. Daily standing weights.\"    Labs:   BNP 1175    Treatment/Orders/MAR:  - amlodipine tab 5mg PO daily  - aspirin chewable tab 81mg PO daily  - diltiazem ER cap 120mg PO daily  - furosemide tab 40mg PO daily  - BMP monitoring  - cardiac consult  Options provided:  -- Acute diastolic CHF ruled out after study and chronic diastolic CHF   confirmed  -- Acute diastolic CHF currently as evidenced by, Please document evidence.  -- Other - I will add my own diagnosis  -- Disagree - Not applicable / Not valid  -- Disagree - Clinically unable to determine / Unknown  -- Refer to Clinical Documentation Reviewer    PROVIDER RESPONSE TEXT:    Acute diastolic CHF has been ruled out after study and chronic diastolic CHF   confirmed.    Query created by: Ranke, Jordan on 7/3/2025 9:55 AM      Electronically signed by:  Genaro Gimenez MD 2025 7:46 AM          
0918: Verified with Alec ZAVALETA regarding meds pt is to receive this AM. Verbal order to hold off on amlodipine but give aspirin.     1355: Pt off floor to OR for TAVR.     1709: Pt returned to floor from OR. HOB flat. R/L groin CDI, no hematoma.     End of Shift Note    Bedside shift change report given to Frances CALLE (oncoming nurse) by Silvio Garcia RN (offgoing nurse).  Report included the following information SBAR    Shift worked:  7a     Shift summary and any significant changes:     TAVR, Bedrest til 845pm     Concerns for physician to address:       Zone phone for oncoming shift:          Activity:  Level of Assistance: Standby assist, set-up cues, supervision of patient - no hands on  Number times ambulated in hallways past shift: 0  Number of times OOB to chair past shift: [phone]    Cardiac:   Cardiac Monitoring: Yes      Cardiac Rhythm: Sinus rhythm with PAC    Access:  Current line(s): PIV     Genitourinary:   Urinary Status: Voiding, Bathroom privileges    Respiratory:   O2 Device: None (Room air)  Chronic home O2 use?: NO  Incentive spirometer at bedside: YES    GI:  Last BM (including prior to admit): 07/07/25  Current diet:  ADULT DIET; Regular; Low Fat/Low Chol/High Fiber/2 gm Na  DIET ONE TIME MESSAGE;  Passing flatus: YES    Pain Management:   Patient states pain is manageable on current regimen: N/A    Skin:  Luis Miguel Scale Score: 20  Interventions: Wound Offloading (Prevention Methods): Repositioning    Patient Safety:  Fall Risk: Nursing Judgement-Fall Risk High(Add Comments): Yes  Fall Risk Interventions  Nursing Judgement-Fall Risk High(Add Comments): Yes  Toilet Every 2 Hours-In Advance of Need: Yes  Hourly Visual Checks: In chair  Fall Visual Posted: Socks, Armband  Room Door Open: Yes  Alarm On: Bed, Chair  Patient Moved Closer to Nursing Station: No    Active Consults:   IP CONSULT TO CARDIOLOGY  IP CONSULT TO CARDIOVASCULAR SURGERY    Length of Stay:  Expected LOS: 13  Actual LOS: 
1016: Notified Alec ZAVALETA that carotid duplex was done in the office about 3 weeks ago. Verbal orders that it does not need to be done again.     1300: Pt returned back from CCL. HOB flat. R groin CDI, no hematoma.     1400: HOB elevated to 30 degrees. R groin CDI, no hematoma.      1700: Pt ambulated to bathroom. Voided. Returned to chair. R groin CDI, no hematoma.  
11:42 AM Pt ready for discharge. RN reviewed all new medications and where to  the prescriptions. RN reviewed how to follow up with the cardiologist. RN reviewed left radial and right and left groin site care instructions. Pt verbalized understanding. RN provided time to answer any and all questions.     
Bedside and Verbal shift change report given to ALVA Ring (oncoming nurse) by ALVA Peralta (offgoing nurse). Report included the following information Nurse Handoff Report, Adult Overview, Intake/Output, MAR, Recent Results, and Cardiac Rhythm Afib.     1240:Patient sitting on side of bed had an episode of feeling \"extremley tired and weak\" /77, HR 60s, denies chest pain, dizziness,sob and NIH negative. Patient describes it as a weird wave of sensations throughout her body \"almost like overly anxious.\" Attempted anxiety reducing techniques. Patient states no change in symptoms. MD Mendel notified.     
Bedside shift report given to Nadia CALLE.  Pending labs at this time  
Cardiology Progress Note      6/29/2025 2:14 PM    Admit Date: 6/27/2025          Subjective:  Had an episode in which she felt \" funny \" this am. No syncope No documented rhythm abnormalities          /61   Pulse 66   Temp 97.5 °F (36.4 °C) (Oral)   Resp 13   Ht 1.575 m (5' 2\")   Wt 66.8 kg (147 lb 4.8 oz)   SpO2 96%   BMI 26.94 kg/m²   06/27 1901 - 06/29 0700  In: 400 [P.O.:400]  Out: 400 [Urine:400]        Objective:      Physical Exam:  VS as above   Chest CTA  Card RRR 3/6 GERTRUDE      Data Review:   Labs:    Hgb 11.5  BUN 19  Creat 1.1     Telemetry: SR       Assessment:       1. 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  2. Atrial fibrillation of uncertain duration, probably persistent at this point with prior ablation procedures. S/p prior Watchman   3. Hypertension.  4. History of diastolic heart failure, prior normal EF by echo.  5. Sleep apnea.  6. Prior CVA.  7. History of GI bleeding.  8. Hypothyroidism.  9. Dyslipidemia.      Plan: Cont current Rx Will review office records and d/w TAVR team              
Cardiology Progress Note      6/30/2025 11:10 AM    Admit Date: 6/27/2025          Subjective:  No new c/o. Remains SOB with minimal activity          BP (!) 141/62   Pulse 72   Temp 98 °F (36.7 °C) (Oral)   Resp 10   Ht 1.575 m (5' 2\")   Wt 66.8 kg (147 lb 4.8 oz)   SpO2 98%   BMI 26.94 kg/m²   06/28 1901 - 06/30 0700  In: 1140 [P.O.:1140]  Out: 1250 [Urine:1250]        Objective:      Physical Exam:  VS as above   Chest CTA  Card RRR 3/6 GERTRUDE no changes       Data Review:   Labs:    Hgb 11.8  BUN 18  Creat 0.9     Telemetry:       Assessment:       1. 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  2. Atrial fibrillation of uncertain duration, probably persistent at this point with prior ablation procedures. S/p prior Watchman   3. Hypertension.  4. History of diastolic heart failure, prior normal EF by echo.  5. Sleep apnea.  6. Prior CVA.  7. History of GI bleeding.  8. Hypothyroidism.  9. Dyslipidemia.       Plan: Have been unable to assess office records due to computer problems with our practice system. Once that is done I will make additional recc She is highly symptomatic and I would not send her home at this point as she lives by herself              
Cardiology Progress Note      7/1/2025 2:15 PM    Admit Date: 6/27/2025          Subjective: Remains very SOB but no major changes Was able to access office records - she had has increased gradients across the AV that recently increased to mean grad of 52mm Hg- TAVR w/u initiated by Dr Zimmer          BP (!) 148/63   Pulse 69   Temp 97.5 °F (36.4 °C) (Oral)   Resp 18   Ht 1.575 m (5' 2\")   Wt 66.8 kg (147 lb 4.8 oz)   SpO2 98%   BMI 26.94 kg/m²   06/29 1901 - 07/01 0700  In: 1950 [P.O.:1940; I.V.:10]  Out: 1300 [Urine:1300]        Objective:      Physical Exam:  VS as above  Chest CTA  Card RRR 3/6 GERTRUDE no gallop     Data Review:   Labs:    BUN 22  Creat 1.1  Hgb 12.1    Telemetry: SR       Assessment:       1. 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 Atrial fibrillation of uncertain duration, probably persistent at this point with prior ablation procedures. S/p prior Watchman   3. Hypertension.  4. History of diastolic heart failure, prior normal EF by echo.  5. Sleep apnea.  6. Prior CVA.  7. History of GI bleeding.  8. Hypothyroidism.  9. Dyslipidemia.       Plan:  D/W Dr Mckeon. Plan cor angio tomm followed by TAVR w/u TAVR team to see              
Cardiology Progress Note      7/2/2025 10:14 AM    Admit Date: 6/27/2025          Subjective:   Stable overnight. Appreciate CT surgery help No new c/o          BP (!) 148/64   Pulse 66   Temp 97.6 °F (36.4 °C) (Oral)   Resp 15   Ht 1.575 m (5' 2\")   Wt 66.8 kg (147 lb 4.8 oz)   SpO2 96%   BMI 26.94 kg/m²   06/30 1901 - 07/02 0700  In: 1720 [P.O.:1700; I.V.:20]  Out: 1775 [Urine:1775]        Objective:      Physical Exam:  VS as above   Chest CTA  Card RRR 3/6 GERTRUDE no gallop     Data Review:   Labs:    BUN 17  Creat 0.7   K 4.3     Telemetry: SR       Assessment:       1. 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 Atrial fibrillation of uncertain duration, probably persistent at this point with prior ablation procedures. S/p prior Watchman   3. Hypertension.  4. History of diastolic heart failure, prior normal EF by echo.  5. Sleep apnea.  6. Prior CVA.  7. History of GI bleeding.  8. Hypothyroidism.  9. Dyslipidemia.       Plan: For RHC, cor angio today. Completed TAVR CTA this am              
Cardiology Progress Note      7/3/2025 8:49 AM    Admit Date: 6/27/2025          Subjective:  No problems overnight Tentative plan for TAVR early next week          /71   Pulse 55   Temp 98 °F (36.7 °C) (Oral)   Resp 18   Ht 1.575 m (5' 2\")   Wt 66.8 kg (147 lb 4.8 oz)   SpO2 98%   BMI 26.94 kg/m²   07/01 1901 - 07/03 0700  In: 460 [P.O.:440; I.V.:20]  Out: 400 [Urine:400]        Objective:      Physical Exam:  VS as above  Chest CTA  Card RRR 3/6 GERTRUDE     Data Review:   Labs:      BUN 22  Creat 1.1     Telemetry: SR       Assessment:     1. 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 Atrial fibrillation of uncertain duration, probably persistent at this point with prior ablation procedures. S/p prior Watchman   3. Hypertension.  4. History of diastolic heart failure, prior normal EF by echo.  5. Sleep apnea.  6. Prior CVA.  7. History of GI bleeding.  8. Hypothyroidism.  9. Dyslipidemia.     Plan:  Cont current Rx Appreciate TAVR team helpNeeds to stay in hospital due to symptomatic AS              
Comprehensive Nutrition Assessment    Type and Reason for Visit:  Initial, LOS    Nutrition Recommendations/Plan:   Continue current diet      Malnutrition Assessment:  Malnutrition Status:  No malnutrition (07/04/25 1054)    Context:  Acute Illness     Findings of the 6 clinical characteristics of malnutrition:  Energy Intake:  No decrease in energy intake  Weight Loss:  No weight loss     Body Fat Loss:  No body fat loss     Muscle Mass Loss:  No muscle mass loss    Fluid Accumulation:  No fluid accumulation     Strength:  Not Performed    Nutrition Assessment:    Patient medically noted for severe AS, bioprosthetic valve failure, CHF, and HTN. PMH CKD, GERD, and CVA. Chart reviewed for length of stay. Plans for TAVR next week. Patient reports her appetite is at baseline. Good PO intake per flowsheets. No significant weight changes recently. No nutrition questions or concerns reported.     Patient Vitals for the past 120 hrs:   PO Meals Eaten (%)   07/03/25 1934 76 - 100%   07/02/25 1400 76 - 100%   07/01/25 1011 76 - 100%   06/30/25 1320 51 - 75%   06/30/25 0951 76 - 100%     Nutrition Related Findings:    Na 134,    BM 7/4   Norvasc, Atorvastatin, Lasix, Synthroid, Protonix, Glycolax, Vitamin D   Wound Type: None       Current Nutrition Intake & Therapies:    Average Meal Intake: 51-75%, %  Average Supplements Intake: None Ordered  ADULT DIET; Regular; Low Fat/Low Chol/High Fiber/2 gm Na    Anthropometric Measures:  Height: 157.5 cm (5' 2\")  Ideal Body Weight (IBW): 110 lbs (50 kg)       Current Body Weight: 65.8 kg (145 lb 1 oz),   IBW.    Current BMI (kg/m2): 26.5                             BMI Categories: Overweight (BMI 25.0-29.9)    Estimated Daily Nutrient Needs:  Energy Requirements Based On: Kcal/kg  Weight Used for Energy Requirements: Current  Energy (kcal/day): 1645 kcals (25 kcals/kg bw)  Weight Used for Protein Requirements: Current  Protein (g/day): 66-79g (1.0-1.2 g/kg bw)  Method 
End of Shift Note    Bedside shift change report given to ALVA Anguiano (oncoming nurse) by Kana Dominique RN (offgoing nurse).  Report included the following information SBAR, Kardex, Intake/Output, MAR, Recent Results, and Cardiac Rhythm Afib, sinus arrhythmia     Shift worked:  8343-0964     Shift summary and any significant changes:     VSS. Patient given 2L o2 at night for CHAU. Labs sent. Patient ambulated to bathroom twice. BL groin and radial sites shaved in preparation for cath. CHG used to cleanse areas. No reports of pain.      Concerns for physician to address:       Zone phone for oncoming shift:          Activity:  Level of Assistance: Standby assist, set-up cues, supervision of patient - no hands on  Number times ambulated in hallways past shift: 0  Number of times OOB to chair past shift: 1    Cardiac:   Cardiac Monitoring: Yes      Cardiac Rhythm: Atrial fib, Sinus arrythmia    Access:  Current line(s): PIV     Genitourinary:   Urinary Status: Voiding    Respiratory:   O2 Device: Nasal cannula  Chronic home O2 use?: NO  Incentive spirometer at bedside: YES    GI:  Last BM (including prior to admit): 07/01/25  Current diet:  ADULT DIET; Regular  Passing flatus: YES    Pain Management:   Patient states pain is manageable on current regimen: YES    Skin:  Luis Miguel Scale Score: 20  Interventions: Wound Offloading (Prevention Methods): Pillows, Repositioning    Patient Safety:  Fall Risk: Nursing Judgement-Fall Risk High(Add Comments): Yes  Fall Risk Interventions  Nursing Judgement-Fall Risk High(Add Comments): Yes  Toilet Every 2 Hours-In Advance of Need: Yes  Hourly Visual Checks: Awake, In chair  Fall Visual Posted: Socks, Fall sign posted  Room Door Open: Yes  Alarm On: Chair, Bed  Patient Moved Closer to Nursing Station: No    Active Consults:   IP CONSULT TO CARDIOLOGY  IP CONSULT TO CARDIOVASCULAR SURGERY    Length of Stay:  Expected LOS: 5  Actual LOS: 5    Kana Dominique RN                           
End of Shift Note    Bedside shift change report given to ALVA Daniels (oncoming nurse) by Neha Arenas RN (offgoing nurse).  Report included the following information SBAR    Shift worked:  7A-7P     Shift summary and any significant changes:     TAVR workup for Tuesday.      Concerns for physician to address:  TAVR workup     Zone phone for oncoming shift:   [phone]       Activity:  Level of Assistance: Standby assist, set-up cues, supervision of patient - no hands on  Number times ambulated in hallways past shift: 1  Number of times OOB to chair past shift: 3    Cardiac:   Cardiac Monitoring: Yes      Cardiac Rhythm: Atrial fib    Access:  Current line(s): PIV     Genitourinary:   Urinary Status: Voiding    Respiratory:   O2 Device: None (Room air)  Chronic home O2 use?: NO  Incentive spirometer at bedside: YES    GI:  Last BM (including prior to admit): 07/02/25  Current diet:  ADULT DIET; Regular; Low Fat/Low Chol/High Fiber/2 gm Na  Passing flatus: YES    Pain Management:   Patient states pain is manageable on current regimen: YES    Skin:  Luis Miguel Scale Score: 20  Interventions: Wound Offloading (Prevention Methods): Repositioning    Patient Safety:  Fall Risk: Nursing Judgement-Fall Risk High(Add Comments): Yes  Fall Risk Interventions  Nursing Judgement-Fall Risk High(Add Comments): Yes  Toilet Every 2 Hours-In Advance of Need: Yes  Hourly Visual Checks: Awake, In chair  Fall Visual Posted: Socks  Room Door Open: Yes  Alarm On: Bed, Chair  Patient Moved Closer to Nursing Station: No    Active Consults:   IP CONSULT TO CARDIOLOGY  IP CONSULT TO CARDIOVASCULAR SURGERY    Length of Stay:  Expected LOS: 11  Actual LOS: 6    Neha Arenas RN                            
End of Shift Note    Bedside shift change report given to ALVA Jaquez (oncoming nurse) by Neha Arenas RN (offgoing nurse).  Report included the following information SBAR    Shift worked:  7A-7P     Shift summary and any significant changes:     CTA results complete, TAVR this week. Waiting for specific equipment. Possible date of Wednesday or Thursday.     Concerns for physician to address:  TAVR     Zone phone for oncoming shift:   [phone]       Activity:  Level of Assistance: Minimal assist, patient does 75% or more  Number times ambulated in hallways past shift: 1  Number of times OOB to chair past shift: 3    Cardiac:   Cardiac Monitoring: Yes      Cardiac Rhythm: Atrial fib    Access:  Current line(s): PIV     Genitourinary:   Urinary Status: Voiding, Bathroom privileges    Respiratory:   O2 Device: None (Room air)  Chronic home O2 use?: NO  Incentive spirometer at bedside: YES    GI:  Last BM (including prior to admit): 07/07/25  Current diet:  ADULT DIET; Regular; Low Fat/Low Chol/High Fiber/2 gm Na  Passing flatus: YES    Pain Management:   Patient states pain is manageable on current regimen: YES    Skin:  Luis Miguel Scale Score: 23  Interventions: Wound Offloading (Prevention Methods): Repositioning    Patient Safety:  Fall Risk: Nursing Judgement-Fall Risk High(Add Comments): Yes  Fall Risk Interventions  Nursing Judgement-Fall Risk High(Add Comments): Yes  Toilet Every 2 Hours-In Advance of Need: Yes  Hourly Visual Checks: Awake, In chair  Fall Visual Posted: Socks  Room Door Open: Yes  Alarm On: Bed, Chair  Patient Moved Closer to Nursing Station: Yes    Active Consults:   IP CONSULT TO CARDIOLOGY  IP CONSULT TO CARDIOVASCULAR SURGERY    Length of Stay:  Expected LOS: 13  Actual LOS: 10    Neha Arenas RN                            
End of Shift Note    Bedside shift change report given to ALVA Solomon (oncoming nurse) by Kana Dominique RN (offgoing nurse).  Report included the following information SBAR, Kardex, Intake/Output, MAR, Recent Results, and Cardiac Rhythm A fib/sinus arrhythmia,     Shift worked:  [id number]     Shift summary and any significant changes:     VSS. Patient tolerating PO intake and ambulation. Labs sent. Patient given 2L o2 at night for CHAU. No reports of pain.      Concerns for physician to address:      Zone phone for oncoming shift:          Activity:  Level of Assistance: Standby assist, set-up cues, supervision of patient - no hands on  Number times ambulated in hallways past shift: 0  Number of times OOB to chair past shift: 1    Cardiac:   Cardiac Monitoring: Yes      Cardiac Rhythm: Atrial fib, Sinus arrythmia    Access:  Current line(s): PIV     Genitourinary:   Urinary Status: Voiding    Respiratory:   O2 Device: Nasal cannula  Chronic home O2 use?: NO  Incentive spirometer at bedside: YES    GI:  Last BM (including prior to admit): 07/02/25  Current diet:  ADULT DIET; Regular; Low Fat/Low Chol/High Fiber/2 gm Na  Passing flatus: YES    Pain Management:   Patient states pain is manageable on current regimen: YES    Skin:  Luis Miguel Scale Score: 20  Interventions: Wound Offloading (Prevention Methods): Repositioning, Pillows    Patient Safety:  Fall Risk: Nursing Judgement-Fall Risk High(Add Comments): Yes  Fall Risk Interventions  Nursing Judgement-Fall Risk High(Add Comments): Yes  Toilet Every 2 Hours-In Advance of Need: Yes  Hourly Visual Checks: Awake, In bed  Fall Visual Posted: Socks, Fall sign posted  Room Door Open: Yes  Alarm On: Chair, Bed  Patient Moved Closer to Nursing Station: No    Active Consults:   IP CONSULT TO CARDIOLOGY  IP CONSULT TO CARDIOVASCULAR SURGERY    Length of Stay:  Expected LOS: 6  Actual LOS: 6    aKna Dominique RN                            
End of Shift Note    Bedside shift change report given to Merline CALLE (oncoming nurse) by Lor Montanez RN (offgoing nurse).  Report included the following information SBAR    Shift worked:  7p-7a     Shift summary and any significant changes:     No significant events. Pt on 1L O2 NC. Voided throughout the night. PRN hydrocortisone cream applied to chest/abd area for redness and rash.      Concerns for physician to address:       Zone phone for oncoming shift:          Lor Montanez RN      
End of Shift Note    Bedside shift report received from ALVA Ceron. Report included the following information Nurse Handoff Report, MAR, Telemetry status, Recent Results, and plan of care. This RN verbalized understanding of plan of care with opportunity for clarification and questions. Care of patient assumed at this time. Dual skin check performed at this time.     Bedside shift report given to  ALVA Horowitz. Report included the following information Nurse Handoff Report, MAR, Telemetry status, Recent Results, and plan of care. Oncoming RN verbalized understanding of plan of care with opportunity for clarification and questions. Dual skin check performed at this time.     Shift worked:  0700 - 1930     Shift summary and any significant changes:     Patient OOB to chair with nursing for meals, SBA. Patient continues to be on 1L NC for comofort.    PRN hydrocortizone ordered for itching rash/reaction from telemetry leads.   Concerns for physician to address:       Zone phone for oncoming shift:          Activity:  Level of Assistance: Minimal assist, patient does 75% or more  Number times ambulated in hallways past shift: 1  Number of times OOB to chair past shift: 3    Cardiac:   Cardiac Monitoring: Yes      Cardiac Rhythm: Atrial fib    Access:  Current line(s): PIV     Genitourinary:   Urinary Status: Voiding    Respiratory:   O2 Device: Nasal cannula  Chronic home O2 use?: NO  Incentive spirometer at bedside: NO    GI:  Last BM (including prior to admit): 06/30/25  Current diet:  ADULT DIET; Regular  Passing flatus: YES    Pain Management:   Patient states pain is manageable on current regimen: YES    Skin:  Luis Miguel Scale Score: 21  Interventions: Wound Offloading (Prevention Methods): Bed, pressure reduction mattress, Pillows, Repositioning    Patient Safety:  Fall Risk: Nursing Judgement-Fall Risk High(Add Comments): Yes  Fall Risk Interventions  Nursing Judgement-Fall Risk High(Add Comments): Yes  Toilet Every 2 
End of Shift Note    Bedside shift report received from ALVA Horowitz. Report included the following information Nurse Handoff Report, MAR, Telemetry status, Recent Results, and plan of care. This RN verbalized understanding of plan of care with opportunity for clarification and questions. Care of patient assumed at this time. Dual skin check performed at this time.     Bedside shift report given to  ____, RN. Report included the following information Nurse Handoff Report, MAR, Telemetry status, Recent Results, and plan of care. Oncoming RN verbalized understanding of plan of care with opportunity for clarification and questions. Dual skin check performed at this time.       Shift worked:  0700  - 1930   Shift summary and any significant changes:     0858 Patient reports feeling more tired, winded, and a little lightheaded than yesterday. She ambulated in room yesterday a few times and completed ADLs with supervision; states that she may have \"overdone it\". Glucose checked, see results. BP and O2 saturation as charted. Placed back on 1L NC for comfort. MD notified for FYI. Plan of care continues.     Increased to 2L NC for comfort.    Plan for cardiac catheterization tomorrow, see cardiology note. TAVR consult; cardiac surgery consult placed.    Patient transferred to IVCU   Concerns for physician to address:       Zone phone for oncoming shift:          Activity:  Level of Assistance: Minimal assist, patient does 75% or more  Number times ambulated in hallways past shift: 0  Number of times OOB to chair past shift: 3    Cardiac:   Cardiac Monitoring: Yes      Cardiac Rhythm: Sinus migdalia, Sinus arrythmia    Access:  Current line(s): PIV     Genitourinary:   Urinary Status: Voiding, Bathroom privileges    Respiratory:   O2 Device: Nasal cannula  Chronic home O2 use?: NO  Incentive spirometer at bedside: N/A    GI:  Last BM (including prior to admit): 06/30/25  Current diet:  ADULT DIET; Regular  Passing flatus: 
[facility] FLOOR (Pre-op) Progress Note    Admit Date: 2025  POD: 4 Days Post-Op      Procedure:  Procedure(s):  Left and right heart cath / coronary angiography  Ultrasound guided vascular access      Subjective/overnight events:   Pt seen with Dr. Jim, up in the chair. In NSR, on room air, afebrile. Vital signs stable. A little tired this AM.     Objective:     /78   Pulse 77   Temp 97.6 °F (36.4 °C)   Resp 18   Ht 1.575 m (5' 2\")   Wt 65.7 kg (144 lb 12.8 oz)   SpO2 98%   BMI 26.48 kg/m²   Temp (24hrs), Av.8 °F (36.6 °C), Min:97.6 °F (36.4 °C), Max:98.1 °F (36.7 °C)      Last 24hr Input/Output:    Intake/Output Summary (Last 24 hours) at 2025 0822  Last data filed at 2025 0438  Gross per 24 hour   Intake 1200 ml   Output 3400 ml   Net -2200 ml        Pre-op Workup    Carotid duplex, 7/3/25:   Interpretation Summary  Show Result Comparison     Mild (<50%) stenosis in the right internal carotid artery.  Mild, calcific and irregular plaque (proximal) in the right internal carotid artery.    Mild (<50%) stenosis in the left internal carotid artery.  Mild and calcific plaque in the left internal carotid artery.    Normal antegrade flow involving the right vertebral artery.    Normal antegrade flow involving the left vertebral artery.       Cardiac cath, 25 progress note from Dr. Pedro:   Findings     1- no angiographically signif CAD  2- mild pulm htn with nl PCWP    Echocardiogram: 23    CHANEL (PRN CONTRAST/BUBBLE/3D) 2023  5:14 PM (Final)    Interpretation Summary    Left Ventricle: Normal left ventricular systolic function with a visually estimated EF of 55 - 60%. Left ventricle size is normal. Normal wall thickness. Normal wall motion.    Aortic Valve: Bioprosthetic valve that is well-seated.    Mitral Valve: Mild to moderate regurgitation with a centrally directed jet.    Left Atrium: Left atrium is moderately dilated. No left atrial appendage thrombus noted. No left 
[facility] FLOOR (Pre-op) Progress Note    Admit Date: 2025  POD: 6 Days Post-Op      Procedure:  Procedure(s):  Left and right heart cath / coronary angiography  Ultrasound guided vascular access      Subjective/overnight events:   Pt discussed with Dr. Jim, and with Dr. Zimmer,  up in the chair. No complaints at present.    Objective:     BP (!) 146/74   Pulse 76   Temp 97.6 °F (36.4 °C) (Oral)   Resp 16   Ht 1.575 m (5' 2\")   Wt 65.7 kg (144 lb 12.8 oz)   SpO2 97%   BMI 26.48 kg/m²   Temp (24hrs), Av.7 °F (36.5 °C), Min:97.6 °F (36.4 °C), Max:97.8 °F (36.6 °C)      Last 24hr Input/Output:    Intake/Output Summary (Last 24 hours) at 2025 0831  Last data filed at 2025 0711  Gross per 24 hour   Intake 1000 ml   Output 1800 ml   Net -800 ml        Pre-op Workup    Carotid duplex, 7/3/25:   Interpretation Summary  Show Result Comparison     Mild (<50%) stenosis in the right internal carotid artery.  Mild, calcific and irregular plaque (proximal) in the right internal carotid artery.    Mild (<50%) stenosis in the left internal carotid artery.  Mild and calcific plaque in the left internal carotid artery.    Normal antegrade flow involving the right vertebral artery.    Normal antegrade flow involving the left vertebral artery.       Cardiac cath, 25 progress note from Dr. Pedro:   Findings     1- no angiographically signif CAD  2- mild pulm htn with nl PCWP    Echocardiogram: 23    CHANEL (PRN CONTRAST/BUBBLE/3D) 2023  5:14 PM (Final)    Interpretation Summary    Left Ventricle: Normal left ventricular systolic function with a visually estimated EF of 55 - 60%. Left ventricle size is normal. Normal wall thickness. Normal wall motion.    Aortic Valve: Bioprosthetic valve that is well-seated.    Mitral Valve: Mild to moderate regurgitation with a centrally directed jet.    Left Atrium: Left atrium is moderately dilated. No left atrial appendage thrombus noted. No left atrial 
[facility] FLOOR (Pre-op) Progress Note    Admit Date: 2025  POD: 3 Days Post-Op      Procedure:  Procedure(s):  Left and right heart cath / coronary angiography  Ultrasound guided vascular access      Subjective/overnight events:   Pt seen with Dr. Jim, up in the chair. In NSR, on room air, afebrile. Vital signs stable. No events overnight. No complaints at present.    Objective:     /62   Pulse 68   Temp 97.8 °F (36.6 °C) (Oral)   Resp 16   Ht 1.575 m (5' 2\")   Wt 65.8 kg (145 lb)   SpO2 99%   BMI 26.52 kg/m²   Temp (24hrs), Av.8 °F (36.6 °C), Min:97.5 °F (36.4 °C), Max:98.1 °F (36.7 °C)      Last 24hr Input/Output:    Intake/Output Summary (Last 24 hours) at 2025 0603  Last data filed at 2025 0415  Gross per 24 hour   Intake 1616 ml   Output 2550 ml   Net -934 ml        Pre-op Workup    Carotid duplex, 7/3/25:   Interpretation Summary  Show Result Comparison     Mild (<50%) stenosis in the right internal carotid artery.  Mild, calcific and irregular plaque (proximal) in the right internal carotid artery.    Mild (<50%) stenosis in the left internal carotid artery.  Mild and calcific plaque in the left internal carotid artery.    Normal antegrade flow involving the right vertebral artery.    Normal antegrade flow involving the left vertebral artery.       Cardiac cath, 25 progress note from Dr. Pedro:   Findings     1- no angiographically signif CAD  2- mild pulm htn with nl PCWP    Echocardiogram: 23    CHANEL (PRN CONTRAST/BUBBLE/3D) 2023  5:14 PM (Final)    Interpretation Summary    Left Ventricle: Normal left ventricular systolic function with a visually estimated EF of 55 - 60%. Left ventricle size is normal. Normal wall thickness. Normal wall motion.    Aortic Valve: Bioprosthetic valve that is well-seated.    Mitral Valve: Mild to moderate regurgitation with a centrally directed jet.    Left Atrium: Left atrium is moderately dilated. No left atrial appendage 
Occupational Therapy Screening     OT referral received, chart reviewed, and patient screened for OT needs. Spoke with patient who reports being at her independent baseline for ADLs and functional mobility. She denies having any concern in regard to her ability to manage her ADLs and reports adequate tolerance and balance during ambulation s/p TAVR. No OT needs indicated at this time, the patient is in agreement and she also reports having supportive friends and a daughter that will be staying with her after discharge. Will complete OT order.     Dwayne Cardenas, OTR/L    
Pharmacy Medication History Review    Medication history obtained by Sherman Sawyer RPH while patient was in room 2152/01 and was completed based on information available during current patient encounter. Medication history was completed after home meds were reconciled by provider.    Pharmacist Admission Medication Reconciliation Recommendations:   None         PTA medication list was corrected to the following:   Prior to Admission Medications   Prescriptions Last Dose Informant   Cholecalciferol 50 MCG ( UT) TABS     Sig: Take 1 tablet by mouth daily   Omega-3 Fatty Acids (FISH OIL) 1000 MG capsule     Sig: Take 1 capsule by mouth daily   Psyllium (METAMUCIL PO)     Sig: Take by mouth in the morning and at bedtime One tablespoon BID   amLODIPine (NORVASC) 5 MG tablet     Sig: Take 1 tablet by mouth daily   aspirin 325 MG tablet Not Taking    Sig: Take 1 tablet by mouth daily   Patient not taking: Reported on 2025   aspirin 81 MG EC tablet     Sig: Take 1 tablet by mouth daily   atorvastatin (LIPITOR) 20 MG tablet     Sig: Take 1 tablet by mouth daily   cetirizine (ZYRTEC) 10 MG tablet     Sig: Take 1 tablet by mouth daily as needed   dicyclomine (BENTYL) 10 MG capsule     Sig: Take 1 capsule by mouth 4 times daily as needed Takes one to two daily as needed   dilTIAZem (CARDIZEM LA) 120 MG TB24 extended release tablet     Sig: Take 1 capsule by mouth daily   fluticasone (FLONASE) 50 MCG/ACT nasal spray     Si sprays by Nasal route daily as needed   furosemide (LASIX) 40 MG tablet     Sig: Take 1 tablet by mouth daily   hydrOXYzine HCl (ATARAX) 50 MG tablet     Sig: Take 1 tablet by mouth nightly as needed   levothyroxine (SYNTHROID) 88 MCG tablet     Sig: Take 1 tablet by mouth every morning (before breakfast) Takes 1 tablet of 88 mcg 6 days out of the week and on  takes 1 & 1/2 tablet   omeprazole (PRILOSEC) 40 MG delayed release capsule     Sig: Take 1 capsule by mouth in the morning and 1 
Physical Therapy    PT referral received, chart reviewed, and patient screened for PT needs. Per OT, patient reports being at her independent baseline for ADLs and functional mobility. She denies having any concern in regard to her ability to manage her ADLs and reports adequate tolerance and balance during ambulation s/p TAVR. Able to ambulate in hallway with nursing staff without difficulty. No PT needs indicated at this time and she also reports having supportive friends and a daughter that will be staying with her after discharge. Will complete PT order.     Dori Dubon, PT, DPT  
Post-Structural Heart End of Shift Note/Checklist    Roxanna Schuster  POD: * Day of Surgery *    Procedure:  Procedure(s):  TRANSCATHETER AORTIC VALVE REPLACEMENT FEMORAL APPROACH  .  Insert temporary pacemaker  Aortic root aortogram  Ultrasound guided vascular access  Angiography w/ bilateral runoff    Shift: 0292-2230  Significant events or complaints during shift: None.  Cardiac rhythm: SR SA variable GA intervals of 1st degree   Vital Signs: Vital signs stable  O2 status: On room air w 2l nc at HS in lieu of home cpap  Febrile/afebrile: Afebrile  Current infusions: LR overnight.    Number of times and distance patient has ambulated: 1.   How is patient tolerating ambulation? Very well, no overt NGUYỄN.     Patient diet: Cardiac diet.  Intake of diet: 100.  Is the patient tolerating eating?  Yes.  Is the patient voiding without difficulty? Yes.  Is the patient passing gas? Yes  Date of last BM: 7/8/2025.      Checklist:   Are I&O documented and accurate? Yes.  For nightshift, is a daily weight documented? Yes.  Was this weight standing? Yes.  Are labs drawn and resulted? Yes.  Has the patient's post-procedure echo been done yet? Yes.  Post-TAVR patients without an existing PPM- is the EKG done and has it transmitted to Epic? Yes.  Have dressings been removed? Yes.              
RHC , cor angio completed s complications    [place]    Findings    1- no angiographically signif CAD  2- mild pulm htn with nl PCWP    Full report to follow   
SBAR update report given to Silvio CALLE by this RN   
Spiritual Care Partner Volunteer visited patient at [facility] in MRM 2 INTRVNTNL CARDIO on 7/4/2025   Documented by:  DELL Trujillo  PRN    paging service [phone]  
Spiritual Care Partner Volunteer visited patient at [facility] in MRM 2 INTRVNTNL CARDIO on 7/2/2025   Documented by:    RITO Subramanian, [facility]   Paging Service [phone])  
Spiritual Health History and Assessment/Progress Note  [facility]    Initial Encounter,  ,  ,      Name: Roxanna Schuster MRN: [id number]    Age: 80 y.o.     Sex: female   Language: English   Orthodox: Confucianism   Symptomatic severe aortic stenosis with normal ejection fraction     Date: 7/6/2025            Total Time Calculated: 10 min              Spiritual Assessment began in MRM 2 INTRVNTNL CARDIO        Referral/Consult From: Rounding   Encounter Overview/Reason: Initial Encounter  Service Provided For: Patient ( was also present)    Anne, Belief, Meaning:   Patient is connected with a anne tradition or spiritual practice and has beliefs or practices that help with coping during difficult times  Family/Friends are connected with a anne tradition or spiritual practice, have beliefs or practices that help with coping during difficult times, and Other: Personal  was present      Importance and Influence:  Patient has spiritual/personal beliefs that influence decisions regarding their health  Family/Friends have spiritual/personal beliefs that influence decisions regarding the patient's health    Community:  Patient is connected with a spiritual community and feels well-supported. Support system includes: Children, Anne Community, Friends, Extended family, and Other:  was present  Family/Friends are connected with a spiritual community:    Assessment and Plan of Care:     Patient Interventions include: Facilitated expression of thoughts and feelings, Explored spiritual coping/struggle/distress, Affirmed coping skills/support systems, and Other: Provided assurance of prayer  Family/Friends Interventions include: Affirmed coping skills/support systems    Patient Plan of Care: Spiritual Care available upon further referral  Family/Friends Plan of Care: Spiritual Care available upon further referral    Electronically signed by Giovani Marvin  Intern on 7/6/2025 at 2:43 
TRANSFER - OUT REPORT:    Verbal report given to ALVA Anguiano on Roxanna Schuster  being transferred to IVCU for routine progression of patient care       Report consisted of patient's Situation, Background, Assessment and   Recommendations(SBAR).     Information from the following report(s) Nurse Handoff Report, MAR, and Cardiac Rhythm a fib/arrythmia was reviewed with the receiving nurse.           Lines:   Peripheral IV 06/27/25 Right Antecubital (Active)   Site Assessment Clean, dry & intact 07/01/25 1138   Line Status Normal saline locked;Capped 07/01/25 1138   Line Care Connections checked and tightened;Cap changed 07/01/25 1138   Phlebitis Assessment No symptoms 07/01/25 1138   Infiltration Assessment 0 07/01/25 1138   Alcohol Cap Used Yes 07/01/25 1138   Dressing Status Clean, dry & intact 07/01/25 1138   Dressing Type Transparent 07/01/25 1138   Dressing Intervention New 06/27/25 1622        Opportunity for questions and clarification was provided.      Patient transported with:  Monitor, O2 @ 2lpm, and Registered Nurse        
[facility] FLO[facility] (Pre-op) Progress Note    Admit Date: 2025  POD: 5 Days Post-Op      Procedure:  Procedure(s):  Left and right heart cath / coronary angiography  Ultrasound guided vascular access      Subjective/overnight events:   Pt seen with Dr. Jim, up in the chair. In NSR, on room air, afebrile. Vital signs stable. No major changes.     Objective:     /65   Pulse 68   Temp 97.7 °F (36.5 °C) (Oral)   Resp 18   Ht 1.575 m (5' 2\")   Wt 65.7 kg (144 lb 12.8 oz)   SpO2 98%   BMI 26.48 kg/m²   Temp (24hrs), Av.7 °F (36.5 °C), Min:97.4 °F (36.3 °C), Max:98.3 °F (36.8 °C)      Last 24hr Input/Output:    Intake/Output Summary (Last 24 hours) at 2025 1515  Last data filed at 2025 0915  Gross per 24 hour   Intake 520 ml   Output 1100 ml   Net -580 ml        Pre-op Workup    Carotid duplex, 7/3/25:   Interpretation Summary  Show Result Comparison     Mild (<50%) stenosis in the right internal carotid artery.  Mild, calcific and irregular plaque (proximal) in the right internal carotid artery.    Mild (<50%) stenosis in the left internal carotid artery.  Mild and calcific plaque in the left internal carotid artery.    Normal antegrade flow involving the right vertebral artery.    Normal antegrade flow involving the left vertebral artery.       Cardiac cath, 25 progress note from Dr. Pedro:   Findings     1- no angiographically signif CAD  2- mild pulm htn with nl PCWP    Echocardiogram: 23    CHANEL (PRN CONTRAST/BUBBLE/3D) 2023  5:14 PM (Final)    Interpretation Summary    Left Ventricle: Normal left ventricular systolic function with a visually estimated EF of 55 - 60%. Left ventricle size is normal. Normal wall thickness. Normal wall motion.    Aortic Valve: Bioprosthetic valve that is well-seated.    Mitral Valve: Mild to moderate regurgitation with a centrally directed jet.    Left Atrium: Left atrium is moderately dilated. No left atrial appendage thrombus noted. No left 
  07/09/25 0353 66 kg (145 lb 9.6 oz)         General Appearance: Well developed, well nourished, alert & oriented x 3,    no acute distress.  Ears/Nose/Mouth/Throat: Hearing grossly normal.  Neck: Supple.  Chest: Lungs clear to auscultation bilaterally.  Cardiovascular: Regular rate and rhythm, S1S2 normal, II/VI systolic murmur.  Abdomen: Soft, non-tender, bowel sounds are active.  Extremities: No edema bilaterally.  Skin: Warm and dry.    PMH/SH reviewed - no change compared to H&P    Data Review    Telemetry: normal sinus rhythm     Lab Data Personally Reviewed:    Recent Labs     07/08/25 0517 07/09/25  0403 07/09/25  1636 07/10/25  0418   WBC 5.3 4.9  --  7.4   HGB 11.4* 12.2 12.6 12.1   HCT 35.8 38.1 38.9 37.6    206  --  190     No results for input(s): \"INR\", \"APTT\" in the last 72 hours.    Invalid input(s): \"PTP\"   Recent Labs     07/08/25  0517 07/09/25  0403 07/10/25  0418    135* 136   K 3.9 3.5 3.7    101 103   CO2 30 30 27   BUN 20 20 16   CREATININE 1.09* 1.00 0.97   MG  --   --  2.0     No results for input(s): \"CPK\", \"TROPHS\" in the last 72 hours.    Invalid input(s): \"CPKMB\", \"CKNDX\"  No results for input(s): \"CHOL\", \"CHLST\", \"CHOLV\", \"HDL\", \"HDLC\", \"LDL\", \"LDLC\" in the last 72 hours.    Invalid input(s): \"CHOLX\", \"CHOLP\", \"HDLP\", \"DLDL\", \"DLDLP\", \"TGL\", \"TGLX\", \"TRIGL\", \"TRIGP\", \"CHHD\", \"CHHDX\"    No results for input(s): \"TP\", \"GLOB\", \"GGT\" in the last 72 hours.    Invalid input(s): \"SGOT\", \"GPT\", \"AP\", \"TBIL\", \"ALB\", \"AML\", \"AMYP\", \"LPSE\", \"HLPSE\"  No results for input(s): \"PH\", \"PCO2\", \"PO2\" in the last 72 hours.  Encounter Date: 06/27/25   EKG 12 lead   Result Value    Ventricular Rate 67    Atrial Rate 72    QRS Duration 98    Q-T Interval 396    QTc Calculation (Bazett) 418    R Axis 66    T Axis 119    Diagnosis      Undetermined rhythm  Nonspecific T wave abnormality  Abnormal ECG  When compared with ECG of 09-JUL-2025 16:24,  MANUAL COMPARISON REQUIRED, DATA IS 
37.1 35.6    179     Recent Labs     07/05/25  0419 07/06/25  0424   * 135*   K 3.5 3.4*   CL 98 100   CO2 31 30   GLUCOSE 116* 113*   BUN 22* 24*   CREATININE 1.09* 1.12*   CALCIUM 8.9 8.9       Signed: Genaro Gimenez MD         
accessory muscle use  CV:  Regular  rate,  No edema  GI:  Soft, Non distended, Non tende  Neurologic:  Alert and oriented X 3, normal speech,   Skin:  No rashes.  No jaundice    Reviewed most current lab test results and cultures  YES  Reviewed most current radiology test results   YES  Review and summation of old records today    NO  Reviewed patient's current orders and MAR    YES  PMH/SH reviewed - no change compared to H&P    Procedures: see electronic medical records for all procedures/Xrays and details which were not copied into this note but were reviewed prior to creation of Plan.      LABS:  I reviewed today's most current labs and imaging studies.  Pertinent labs include:  Recent Labs     06/27/25  1621 06/28/25  0358 06/29/25  0312   WBC 11.7* 7.4 5.6   HGB 13.4 12.0 11.5   HCT 40.6 36.7 35.4    169 159     Recent Labs     06/27/25  1621 06/28/25  0358 06/29/25  0312   * 140 135*   K 3.4* 3.5 3.6   CL 98 103 102   CO2 30 29 27   GLUCOSE 113* 112* 104*   BUN 21* 15 19   CREATININE 1.32* 0.93 1.10*   CALCIUM 9.4 8.9 8.6   MG  --  2.2 2.1   PHOS  --   --  3.7   BILITOT 0.8 1.0  --    AST 26 19  --    ALT 32 24  --        Signed: David L Mendel, MD          
flutter/SVT s/p A fib ablation 12/2023 and Watchman 3/24/24: On dilt PTA- continue. Will get Eliquis x  months as above. Maintain K+ >4.0 and magnesium >2.0.  Possible early sinus node dysfunction: May end up needing a PPM. Follow up with Dr. Campoverde.   Mild bilateral carotid stenosis: Continue ASA, statin. OP follow up with Vascular.   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 Continue statin. Plan for Eliquis alone at 2.5 mg BID x 3 months for Sergio TAVR, then will take ASA alone for antiplatelet.   Mild bilateral carotid stenosis: Continue statin. Plan for Eliquis alone at 2.5 mg BID x 3 months for Sergio TAVR, then will take ASA alone for antiplateletOP follow up with Vascular.   CHAU, on Bipap: Continue.   HTN:  On Norvasc, dilt PTA; continue.   HLD: on statin PTA; continue.   CKD stage 3: no nephrologist PTA. Avoid nephrotoxins as able- creatinine 0.97 this AM. OP follow up.   Hypothyroidism: On Synthroid PTA; continue.   GERD: On Prilosec PTA; continue per formulary.   GIB 10/23: Per GI note dated 12/21/23: CT abd/pelvis: No acute findings in the abdomen or pelvis. 2. Colonic diverticulosis. No evidence of acute diverticulitis. EGD 10/2023: normal mucosa. CLN 10/23: sigmoid and descending diverticulosis, mild mucosa fragility throughout colon. On PPI as above and Metamucil.    Arthritis s/p R TKA 2014 and L TKA 2022: Supportive care.      Fluid and electrolytes: PO. Maintain K+ >4 and magnesium level >2.  Nutrition: ADULT DIET; Regular; Low Fat/Low Chol/High Fiber/2 gm Na  DIET ONE TIME MESSAGE;  Activity: OOB all meals and ambulate in halls TID; IS 10-15 x an hour while awake.  Bowel Regimen: Per primary team.   GI ppx: Per primary team.  DVT ppx: SCDs.  Dispo:  Home today- no anticipated case management needs  Case discussed with: Primary RN and hospitalist.    Signed By: Aury Michael APRN - NP      
PROCEDURE N/A 12/07/2023    Ablation A-fib w complete ep study performed by Christiano Campoverde MD at [facility] CARDIAC CATH LAB    EP DEVICE PROCEDURE N/A 12/07/2023    Ablation following A-fib addl performed by Christiano Campoverde MD at [facility] CARDIAC CATH LAB    EP DEVICE PROCEDURE N/A 3/14/2024    Watchman yesenia closure device performed by Christiano Campoverde MD at [facility] CARDIAC CATH LAB    HYSTERECTOMY (CERVIX STATUS UNKNOWN)      ORTHOPEDIC SURGERY  2014    tendon realigment    TOTAL KNEE ARTHROPLASTY Right 04/2014    TOTAL KNEE ARTHROPLASTY Left 2022       Social History     Tobacco Use    Smoking status: Never    Smokeless tobacco: Never   Substance Use Topics    Alcohol use: Not Currently        Family History   Problem Relation Age of Onset    Other Other         brain aneurysm in father, paternal aunt and uncle and sister    Thyroid Disease Sister         Allergies   Allergen Reactions    Penicillins Swelling     Difficulty breatheing    Erythromycin Nausea And Vomiting          Current Facility-Administered Medications   Medication Dose Route Frequency    potassium chloride (KLOR-CON) extended release tablet 40 mEq  40 mEq Oral Once    polyethylene glycol (GLYCOLAX) packet 17 g  17 g Oral BID    sodium chloride flush 0.9 % injection 5-40 mL  5-40 mL IntraVENous 2 times per day    sodium chloride flush 0.9 % injection 5-40 mL  5-40 mL IntraVENous PRN    0.9 % sodium chloride infusion   IntraVENous PRN    acetaminophen (TYLENOL) tablet 650 mg  650 mg Oral Q4H PRN    hydrocortisone 1 % cream   Topical BID PRN    enoxaparin (LOVENOX) injection 40 mg  40 mg SubCUTAneous Daily    atorvastatin (LIPITOR) tablet 20 mg  20 mg Oral Nightly    Vitamin D (CHOLECALCIFEROL) tablet 2,000 Units  2,000 Units Oral Daily    dilTIAZem (CARDIZEM CD) extended release capsule 120 mg  120 mg Oral Daily    fluticasone (FLONASE) 50 MCG/ACT nasal spray 2 spray  2 spray Nasal Daily PRN    furosemide (LASIX) tablet 40 mg  40 mg Oral Daily    
discomfort is increased.    Angina Classification: 2   Class 0: No symptoms   Class 1: Angina with strenuous exercise   Class 2: Angina with moderate exercise   Class 3: Angina with mild exertion   Walking 1-2 level blocks at normal pace; Climbing 1 flight of stairs at normal pace  Class 4: Angina at any level of physical exertion       KCCQ-12: scanned into EMR    6 minute walk test: N/A  Five meter walks  Walk 1: 13.7 Seconds  Walk 2: 11.4 Seconds  Walk 3: 13.3 Seconds  Total: 38.4 Seconds  Average: 12.8     Frality Survey:  Solitario Index ADL - 6/6  scanned into EMR       STS:   Procedure Type: Isolated AVR   PERIOPERATIVE OUTCOME ESTIMATE %   Operative Mortality 6.57%   Morbidity & Mortality 19.4%   Stroke 3.79%   Renal Failure 3.63%   Reoperation 5.29%   Prolonged Ventilation 11.4%   Deep Sternal Wound Infection 0.105%   Long Hospital Stay (>14 days) 11.1%   Short Hospital Stay (<6 days)* 15.3%          Clinical Summary         Planned Surgery: Isolated AVR, Elective, ReOp#1 cardiovascular surgery   Demographics: 80 year old, White, female, 66.8kg, 157.5cm, BMI: 26.9 kg/m²   Insurance/Payor: Medicare   Lab Values: Creatinine: 0.68 mg/dL, Hematocrit: 37.5%, WBC Count: 5.3 10³/?L, Platelet Count: 134131 cells/?L   Substance Abuse: Never smoker   Risk Factors / Comorbidities: Hypertension   Pulmonary RF: Unknown CLD, Sleep Apnea   Vascular RF: Cerebrovascular Disease: CVA > 30 days   Cardiac Status: Acute and chronic heart failure, NYHA Class IV, Ejection Fraction = 60%   Valve Disease: Aortic Stenosis, Mitral Stenosis, Mild MR, Mild TR   Arrhythmia: Recent A-fib, Paroxysmal, Remote Atrial Flutter   Prev. Cardiac Interv: Previous valve: Aortic valve replacement, surgical        Risk stratification for severe AS: Intermediate        Admission Weight: Last Weight   Weight - Scale: 68 kg (149 lb 14.6 oz) Weight - Scale: 66.8 kg (147 lb 4.8 oz)       EXAM:     General: awake, alert, and oriented   Lungs:    Respirations 
or dyspnea.   Class II (Mild): Slight limitation of physical activity. Comfortable at rest, but ordinary physical activity results in fatigue, palpitation, or dyspnea.   Class III (Moderate): Marked limitation of physical activity. Comfortable at rest, but less than ordinary activity causes fatigue, palpitation, or dyspnea   Class IV (Severe): Unable to carry out any physical activity without discomfort. Symptoms  of cardiac insufficiency at rest.  If any physical activity is undertaken, discomfort is increased.    Angina Classification: 2   Class 0: No symptoms   Class 1: Angina with strenuous exercise   Class 2: Angina with moderate exercise   Class 3: Angina with mild exertion   Walking 1-2 level blocks at normal pace; Climbing 1 flight of stairs at normal pace  Class 4: Angina at any level of physical exertion       KCCQ-12: scanned into EMR    6 minute walk test: N/A  Five meter walks  Walk 1: 13.7 Seconds  Walk 2: 11.4 Seconds  Walk 3: 13.3 Seconds  Total: 38.4 Seconds  Average: 12.8     Frality Survey:  Solitario Index ADL - 6/6  scanned into EMR       STS:   Procedure Type: Isolated AVR   PERIOPERATIVE OUTCOME ESTIMATE %   Operative Mortality 6.57%   Morbidity & Mortality 19.4%   Stroke 3.79%   Renal Failure 3.63%   Reoperation 5.29%   Prolonged Ventilation 11.4%   Deep Sternal Wound Infection 0.105%   Long Hospital Stay (>14 days) 11.1%   Short Hospital Stay (<6 days)* 15.3%          Clinical Summary         Planned Surgery: Isolated AVR, Elective, ReOp#1 cardiovascular surgery   Demographics: 80 year old, White, female, 66.8kg, 157.5cm, BMI: 26.9 kg/m²   Insurance/Payor: Medicare   Lab Values: Creatinine: 0.68 mg/dL, Hematocrit: 37.5%, WBC Count: 5.3 10³/?L, Platelet Count: 005792 cells/?L   Substance Abuse: Never smoker   Risk Factors / Comorbidities: Hypertension   Pulmonary RF: Unknown CLD, Sleep Apnea   Vascular RF: Cerebrovascular Disease: CVA > 30 days   Cardiac Status: Acute and chronic heart failure, 
  WBC 5.4   HGB 12.3   HCT 37.6      *   K 4.1   BUN 23*         Assessment:     Patient Active Problem List   Diagnosis    DJD (degenerative joint disease) of knee    Atrial fibrillation (formerly [facility])    Total knee replacement status    Acute CVA (cerebrovascular accident) (formerly [facility])    Insomnia    TIA (transient ischemic attack)    Hypertension    Visual disturbance    Left atrial flutter by electrocardiogram (formerly [facility])    Acute gastrointestinal bleeding    GI bleed    Paroxysmal atrial fibrillation (formerly [facility])    Symptomatic severe aortic stenosis with normal ejection fraction    Bilateral carotid artery stenosis    Preop cardiovascular exam           Plan/Recommendations/Medical Decision Making:     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 Planning for Sergio TAVR next week pending Dr. Zimmer's approval and [employer] report. Keep inpatient for now.   Mild MR, mild TR, trace MD, mild MS: Noted on pre-op echo.  Acute on chronic HFpEF, NYHA class IV, LVEF 60-65%: Diuresis per Cardiology for now. Blood pressure and risk factor control. Strict I&O. Daily standing weights.   Paroxysmal A fib/a flutter/SVT s/p A fib ablation 12/2023 and Watchman 3/24/24: On dilt PTA- will discuss plan for holding prior to procedure. Maintain K+ >4.0 and magnesium >2.0.  Mild bilateral carotid stenosis: Continue ASA, statin. OP follow up with Vascular.   History of CVA 5 days post AVR; no recurrence: Noted. Check carotid duplex.   CHAU, on Bipap: Continue.   HTN:  On Norvasc, dilt PTA; continue per cardiology for now.   HLD: on statin PTA; continue.   CKD stage 3: no nephrologist PTA. Avoid nephrotoxins as able- creatinine 1.10 this AM. Repeat labs in AM. Strict I&O, daily weights.   Hypothyroidism: On Synthroid PTA; continue.   GERD: On Prilosec PTA; continue per formulary.   GIB 10/23: Per GI note dated 12/21/23: CT abd/pelvis: No acute findings in the abdomen or pelvis. 2.

## 2025-07-10 NOTE — DISCHARGE SUMMARY
Discharge Summary    Name: Roxanna Schuster  [id number]  YOB: 1945 (Age: 80 y.o.)   Date of Admission: 6/27/2025  Date of Discharge: 7/10/2025  Attending Physician: Dr. 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  female with PMHx significant for  has a past medical history of Aortic stenosis, Arrhythmia, Arrhythmia, Arthritis, Cancer (HCC), Chronic pain, Degenerative disc disease, cervical, Diverticulosis, GERD (gastroesophageal reflux disease), Heart failure (HCC), History of blood transfusion, History of cardioversion, Other and unspecified hyperlipidemia, Rheumatic fever, Scarlet fever, Sleep apnea, Stroke (HCC), Unspecified essential hypertension, and Unspecified hypothyroidism. who presents with the above chief complaint.      We were asked to admit for work up and further evaluation.      Patient here with chief complaint of chest pain and dyspnea and fatigue.  She reports that she has not been feeling well over the past several months however this is worsened significantly over the past 1 week or follow-up so.  She notes daily fatigue during this time.  She notes that she was at water aerobics earlier today when she had to stop several times during the session due

## 2025-07-10 NOTE — CARE COORDINATION
Pt clear from CM standpoint.    Transition of Care Plan:    RUR: 10% \"low risk\"  Prior Level of Functioning: Independent with ADL's   Disposition: Home with family support   JOHN: 7/10  If SNF or IPR: Date FOC offered: N/A  Follow up appointments: PCP/Specialists as indicated  DME needed: None   Transportation at discharge: Family to transport   IM/IMM Medicare/ letter given: 2nd IM given by CM on 7/10  Is patient a  and connected with VA? No  Caregiver Contact: Karen Durán (child), [phone]  Discharge Caregiver contacted prior to discharge? To be contacted  Care Conference needed? Not at this time   Barriers to discharge: CTS     0914 AM: Chart reviewed. CM acknowledged d/c order. CM met with pt at the bedside to discuss d/c plan. Pt declined  services at this time. Pt to return home with support from pt daughter and grandson.     07/10/25 0916   Services At/After Discharge   Transition of Care Consult (CM Consult) Discharge Planning   Services At/After Discharge None    Resource Information Provided? No   Mode of Transport at Discharge Other (see comment)  (Daughter)   Hospital Transport Time of Discharge 1200   Confirm Follow Up Transport Family   Condition of Participation: Discharge Planning   The Plan for Transition of Care is related to the following treatment goals: Return home with family support   The Patient and/or Patient Representative was provided with a Choice of Provider? Patient   The Patient and/Or Patient Representative agree with the Discharge Plan? Yes     THOMAS Yeager  Care Management  [facility]   [phone]

## 2025-07-10 NOTE — OP NOTE
Transcatheter Aortic Valve Replacement Percutaneously through the Femoral Artery     Indication  The valve was placed for severe prosthetic valve aortic stenosis. High risk.     Interventional cardiologist: Austin Zimmer MD, Valeriy Mckinney MD.  Cardiac surgeon: Munir Jim MD.     Access  The valve was placed using a transfemoral approach.   Initially a 6Fr sheath was placed in the RFA, 6Fr long sheath in LFV, 6Fr sheath in the LFA using ultrasound and micropuncture technique.  The LFV was used to place temporary venous wire.  The LFA was used to place a pig tail for angiography.  The RFA was preclosed with 2 perclose sutures.   Then delivery sheath was placed and patient heparinized.     Procedure     Prior to placement of the aortic valve, an aortogram were performed.  Left heart catheterization was peformed.  A balloon aortic valvuloplasty was not performed prior to valve placement.  A 20 mm Acevedo Shawn S3 Resilia aortic valve was then deployed during rapid ventricular pacing. Post-deployment balloon inflation was performed with valve fracture with 20mm True Balloon.  Cardiopulmonary bypass support was not used for hemodynamic support during the procedure.     At the completion of the case, catheters were removed. Protamine was given. The valve delivery sheath was removed and hemostasis obtained by 2 perclose suture.  6Fr LFA hemostasis via external pressure. Other venous sheaths were removed and hemostasis obtained by manual compression for 15 minutes.     Hemodynamics     Pre- deplyment hemodynamics showed severe aortic stenosis with peak gradient of 45 mm Hg, with LVEDP of 10 mm Hg.  Post-deployment hemodynamics showed transvalvular peak gradient of 12 mm Hg, with LVEDP of 10 mm Hg.     Angiography  1. Aortic angiography pre intervention was not performed.  2. Aortic angiography post TAVR demonstrates the valve in good position with no AI with patent coronary arteries.  3. Abdominal aortography showed

## 2025-07-10 NOTE — PLAN OF CARE
Problem: Chronic Conditions and Co-morbidities  Goal: Patient's chronic conditions and co-morbidity symptoms are monitored and maintained or improved  7/1/2025 1014 by Merline Alvarado RN  Outcome: Progressing  7/1/2025 1013 by Merline Alvarado RN  Outcome: Progressing     Problem: Discharge Planning  Goal: Discharge to home or other facility with appropriate resources  7/1/2025 1014 by Merline Alvarado RN  Outcome: Progressing  7/1/2025 1013 by Merline Alvarado RN  Outcome: Progressing  7/1/2025 0032 by Lor Montanez RN  Outcome: Progressing     Problem: Neurosensory - Adult  Goal: Achieves stable or improved neurological status  7/1/2025 1014 by Merline Alvarado RN  Outcome: Progressing  7/1/2025 1013 by Merline Alvarado RN  Outcome: Progressing  7/1/2025 0032 by Lor Montanez RN  Outcome: Progressing     Problem: Respiratory - Adult  Goal: Achieves optimal ventilation and oxygenation  7/1/2025 1014 by Merline Alvarado RN  Outcome: Progressing  7/1/2025 1013 by Merline Alvarado RN  Outcome: Progressing  7/1/2025 0032 by Lor Montanez RN  Outcome: Progressing     Problem: Cardiovascular - Adult  Goal: Maintains optimal cardiac output and hemodynamic stability  7/1/2025 1014 by Merline Alvarado RN  Outcome: Progressing  7/1/2025 1013 by Merline Alvarado RN  Outcome: Progressing  7/1/2025 0032 by Lor Montanez RN  Outcome: Progressing  Goal: Absence of cardiac dysrhythmias or at baseline  7/1/2025 1014 by Merline Alvarado RN  Outcome: Progressing  7/1/2025 1013 by Merline Alvarado RN  Outcome: Progressing     Problem: Skin/Tissue Integrity - Adult  Goal: Skin integrity remains intact  7/1/2025 1014 by Merline Alvarado RN  Outcome: Progressing  7/1/2025 1013 by Merline Alvarado RN  Outcome: Progressing  7/1/2025 0032 by Lor Montanez RN  Outcome: Progressing     Problem: Musculoskeletal - Adult  Goal: Return mobility to safest level of function  7/1/2025 1014 by Merline Alvarado RN  Outcome: Progressing  7/1/2025 1013 by Merline Alvarado, 
  Problem: Chronic Conditions and Co-morbidities  Goal: Patient's chronic conditions and co-morbidity symptoms are monitored and maintained or improved  7/2/2025 1133 by Silvio Garcia RN  Outcome: Progressing  Flowsheets (Taken 7/2/2025 0739)  Care Plan - Patient's Chronic Conditions and Co-Morbidity Symptoms are Monitored and Maintained or Improved: Monitor and assess patient's chronic conditions and comorbid symptoms for stability, deterioration, or improvement  7/2/2025 0334 by Kana Dominique RN  Outcome: Progressing     Problem: Discharge Planning  Goal: Discharge to home or other facility with appropriate resources  7/2/2025 1133 by Silvio Garcia RN  Outcome: Progressing  Flowsheets (Taken 7/2/2025 0739)  Discharge to home or other facility with appropriate resources: Identify barriers to discharge with patient and caregiver  7/2/2025 0334 by Kana Dominique RN  Outcome: Progressing     Problem: Neurosensory - Adult  Goal: Achieves stable or improved neurological status  7/2/2025 1133 by Silvio Garcia RN  Outcome: Progressing  7/2/2025 0334 by Kana Dominique RN  Outcome: Progressing     Problem: Respiratory - Adult  Goal: Achieves optimal ventilation and oxygenation  7/2/2025 1133 by Silvio Garcia RN  Outcome: Progressing  7/2/2025 0334 by Kana Dominique RN  Outcome: Progressing     Problem: Cardiovascular - Adult  Goal: Maintains optimal cardiac output and hemodynamic stability  7/2/2025 1133 by Silvio Garcia RN  Outcome: Progressing  Flowsheets (Taken 7/2/2025 0739)  Maintains optimal cardiac output and hemodynamic stability:   Monitor blood pressure and heart rate   Monitor urine output and notify Licensed Independent Practitioner for values outside of normal range  7/2/2025 0334 by Kana Dominique RN  Outcome: Progressing  Goal: Absence of cardiac dysrhythmias or at baseline  7/2/2025 1133 by Silvio Garcia RN  Outcome: Progressing  Flowsheets (Taken 7/2/2025 0739)  Absence of cardiac dysrhythmias or at 
  Problem: Chronic Conditions and Co-morbidities  Goal: Patient's chronic conditions and co-morbidity symptoms are monitored and maintained or improved  7/8/2025 0900 by Silvio Garcia RN  Outcome: Progressing  7/7/2025 2027 by Gisele Sen RN  Outcome: Progressing  Flowsheets (Taken 7/7/2025 2000)  Care Plan - Patient's Chronic Conditions and Co-Morbidity Symptoms are Monitored and Maintained or Improved: Monitor and assess patient's chronic conditions and comorbid symptoms for stability, deterioration, or improvement     Problem: Discharge Planning  Goal: Discharge to home or other facility with appropriate resources  7/8/2025 0900 by Silvio Garcia RN  Outcome: Progressing  7/7/2025 2027 by Gisele Sen RN  Outcome: Progressing  Flowsheets (Taken 7/7/2025 2000)  Discharge to home or other facility with appropriate resources: Identify barriers to discharge with patient and caregiver     Problem: Neurosensory - Adult  Goal: Achieves stable or improved neurological status  7/8/2025 0900 by Silvio Garcia RN  Outcome: Progressing  7/7/2025 2027 by Gisele Sen RN  Outcome: Progressing  Flowsheets (Taken 7/7/2025 2000)  Achieves stable or improved neurological status: Assess for and report changes in neurological status     Problem: Respiratory - Adult  Goal: Achieves optimal ventilation and oxygenation  7/8/2025 0900 by Silvio Garcia RN  Outcome: Progressing  7/7/2025 2027 by Gisele Sen RN  Outcome: Progressing  Flowsheets (Taken 7/7/2025 2000)  Achieves optimal ventilation and oxygenation: Assess for changes in respiratory status     Problem: Cardiovascular - Adult  Goal: Maintains optimal cardiac output and hemodynamic stability  7/8/2025 0900 by Silvio Garcia RN  Outcome: Progressing  Flowsheets (Taken 7/8/2025 0711)  Maintains optimal cardiac output and hemodynamic stability:   Monitor blood pressure and heart rate   Monitor urine output and notify Licensed Independent Practitioner for 
  Problem: Chronic Conditions and Co-morbidities  Goal: Patient's chronic conditions and co-morbidity symptoms are monitored and maintained or improved  Outcome: Progressing     Problem: Discharge Planning  Goal: Discharge to home or other facility with appropriate resources  7/1/2025 1013 by Merline Alvarado RN  Outcome: Progressing  7/1/2025 0032 by Lor Montanez RN  Outcome: Progressing     Problem: Neurosensory - Adult  Goal: Achieves stable or improved neurological status  7/1/2025 1013 by Merline Alvarado RN  Outcome: Progressing  7/1/2025 0032 by Lor Montanez RN  Outcome: Progressing     Problem: Respiratory - Adult  Goal: Achieves optimal ventilation and oxygenation  7/1/2025 1013 by Merline Alvarado RN  Outcome: Progressing  7/1/2025 0032 by Lor Montanez RN  Outcome: Progressing     Problem: Cardiovascular - Adult  Goal: Maintains optimal cardiac output and hemodynamic stability  7/1/2025 1013 by Merline Alvarado RN  Outcome: Progressing  7/1/2025 0032 by Lor Montanez RN  Outcome: Progressing  Goal: Absence of cardiac dysrhythmias or at baseline  Outcome: Progressing     Problem: Skin/Tissue Integrity - Adult  Goal: Skin integrity remains intact  7/1/2025 1013 by Merilne Alvarado RN  Outcome: Progressing  7/1/2025 0032 by Lor Montanez RN  Outcome: Progressing     Problem: Musculoskeletal - Adult  Goal: Return mobility to safest level of function  7/1/2025 1013 by Merline Alvarado RN  Outcome: Progressing  7/1/2025 0032 by Lor Montanez RN  Outcome: Progressing     Problem: Gastrointestinal - Adult  Goal: Minimal or absence of nausea and vomiting  Outcome: Progressing     Problem: Genitourinary - Adult  Goal: Absence of urinary retention  Outcome: Progressing     Problem: Infection - Adult  Goal: Absence of infection at discharge  Outcome: Progressing     Problem: Metabolic/Fluid and Electrolytes - Adult  Goal: Electrolytes maintained within normal limits  Outcome: Progressing     Problem: Hematologic - 
  Problem: Chronic Conditions and Co-morbidities  Goal: Patient's chronic conditions and co-morbidity symptoms are monitored and maintained or improved  Outcome: Progressing     Problem: Discharge Planning  Goal: Discharge to home or other facility with appropriate resources  Outcome: Progressing     Problem: Neurosensory - Adult  Goal: Achieves stable or improved neurological status  Outcome: Progressing     Problem: Respiratory - Adult  Goal: Achieves optimal ventilation and oxygenation  Outcome: Progressing     Problem: Cardiovascular - Adult  Goal: Maintains optimal cardiac output and hemodynamic stability  Outcome: Progressing  Flowsheets (Taken 7/4/2025 0738)  Maintains optimal cardiac output and hemodynamic stability:   Monitor blood pressure and heart rate   Monitor urine output and notify Licensed Independent Practitioner for values outside of normal range   Assess for signs of decreased cardiac output  Goal: Absence of cardiac dysrhythmias or at baseline  Outcome: Progressing  Flowsheets (Taken 7/4/2025 0738)  Absence of cardiac dysrhythmias or at baseline: Monitor cardiac rate and rhythm     Problem: Skin/Tissue Integrity - Adult  Goal: Skin integrity remains intact  Outcome: Progressing  Flowsheets (Taken 7/4/2025 0738)  Skin Integrity Remains Intact:   Monitor for areas of redness and/or skin breakdown   Assess vascular access sites hourly     Problem: Musculoskeletal - Adult  Goal: Return mobility to safest level of function  Outcome: Progressing     Problem: Gastrointestinal - Adult  Goal: Minimal or absence of nausea and vomiting  Outcome: Progressing     Problem: Genitourinary - Adult  Goal: Absence of urinary retention  Outcome: Progressing     Problem: Infection - Adult  Goal: Absence of infection at discharge  Outcome: Progressing     Problem: Metabolic/Fluid and Electrolytes - Adult  Goal: Electrolytes maintained within normal limits  Outcome: Progressing     Problem: Hematologic - Adult  Goal: 
  Problem: Chronic Conditions and Co-morbidities  Goal: Patient's chronic conditions and co-morbidity symptoms are monitored and maintained or improved  Outcome: Progressing     Problem: Discharge Planning  Goal: Discharge to home or other facility with appropriate resources  Outcome: Progressing     Problem: Neurosensory - Adult  Goal: Achieves stable or improved neurological status  Outcome: Progressing     Problem: Respiratory - Adult  Goal: Achieves optimal ventilation and oxygenation  Outcome: Progressing     Problem: Cardiovascular - Adult  Goal: Maintains optimal cardiac output and hemodynamic stability  Outcome: Progressing  Goal: Absence of cardiac dysrhythmias or at baseline  Outcome: Progressing     Problem: Skin/Tissue Integrity - Adult  Goal: Skin integrity remains intact  Outcome: Progressing     Problem: Musculoskeletal - Adult  Goal: Return mobility to safest level of function  Outcome: Progressing     Problem: Gastrointestinal - Adult  Goal: Minimal or absence of nausea and vomiting  Outcome: Progressing     Problem: Genitourinary - Adult  Goal: Absence of urinary retention  Outcome: Progressing     Problem: Infection - Adult  Goal: Absence of infection at discharge  Outcome: Progressing     Problem: Metabolic/Fluid and Electrolytes - Adult  Goal: Electrolytes maintained within normal limits  Outcome: Progressing     Problem: Hematologic - Adult  Goal: Maintains hematologic stability  Outcome: Progressing     Problem: Safety - Adult  Goal: Free from fall injury  Outcome: Progressing     Problem: Pain  Goal: Verbalizes/displays adequate comfort level or baseline comfort level  Outcome: Progressing     
  Problem: Chronic Conditions and Co-morbidities  Goal: Patient's chronic conditions and co-morbidity symptoms are monitored and maintained or improved  Outcome: Progressing     Problem: Discharge Planning  Goal: Discharge to home or other facility with appropriate resources  Outcome: Progressing     Problem: Neurosensory - Adult  Goal: Achieves stable or improved neurological status  Outcome: Progressing     Problem: Respiratory - Adult  Goal: Achieves optimal ventilation and oxygenation  Outcome: Progressing     Problem: Cardiovascular - Adult  Goal: Maintains optimal cardiac output and hemodynamic stability  Outcome: Progressing  Goal: Absence of cardiac dysrhythmias or at baseline  Outcome: Progressing     Problem: Skin/Tissue Integrity - Adult  Goal: Skin integrity remains intact  Outcome: Progressing  Flowsheets (Taken 6/29/2025 6907)  Skin Integrity Remains Intact: Monitor for areas of redness and/or skin breakdown     Problem: Musculoskeletal - Adult  Goal: Return mobility to safest level of function  Outcome: Progressing     Problem: Gastrointestinal - Adult  Goal: Minimal or absence of nausea and vomiting  Outcome: Progressing     Problem: Genitourinary - Adult  Goal: Absence of urinary retention  Outcome: Progressing     Problem: Infection - Adult  Goal: Absence of infection at discharge  Outcome: Progressing     Problem: Metabolic/Fluid and Electrolytes - Adult  Goal: Electrolytes maintained within normal limits  Outcome: Progressing     Problem: Hematologic - Adult  Goal: Maintains hematologic stability  Outcome: Progressing     Problem: Safety - Adult  Goal: Free from fall injury  Outcome: Progressing     
  Problem: Chronic Conditions and Co-morbidities  Goal: Patient's chronic conditions and co-morbidity symptoms are monitored and maintained or improved  Outcome: Progressing  Flowsheets (Taken 6/28/2025 1910)  Care Plan - Patient's Chronic Conditions and Co-Morbidity Symptoms are Monitored and Maintained or Improved:   Monitor and assess patient's chronic conditions and comorbid symptoms for stability, deterioration, or improvement   Collaborate with multidisciplinary team to address chronic and comorbid conditions and prevent exacerbation or deterioration   Update acute care plan with appropriate goals if chronic or comorbid symptoms are exacerbated and prevent overall improvement and discharge     Problem: Discharge Planning  Goal: Discharge to home or other facility with appropriate resources  Outcome: Progressing  Flowsheets (Taken 6/28/2025 1910)  Discharge to home or other facility with appropriate resources:   Identify barriers to discharge with patient and caregiver   Arrange for needed discharge resources and transportation as appropriate   Identify discharge learning needs (meds, wound care, etc)   Arrange for interpreters to assist at discharge as needed     Problem: Neurosensory - Adult  Goal: Achieves stable or improved neurological status  Outcome: Progressing  Flowsheets (Taken 6/28/2025 1910)  Achieves stable or improved neurological status:   Assess for and report changes in neurological status   Initiate measures to prevent increased intracranial pressure   Maintain blood pressure and fluid volume within ordered parameters to optimize cerebral perfusion and minimize risk of hemorrhage     Problem: Respiratory - Adult  Goal: Achieves optimal ventilation and oxygenation  Outcome: Progressing  Flowsheets (Taken 6/28/2025 1910)  Achieves optimal ventilation and oxygenation:   Assess for changes in respiratory status   Assess for changes in mentation and behavior   Position to facilitate oxygenation and 
  Problem: Discharge Planning  Goal: Discharge to home or other facility with appropriate resources  6/30/2025 0331 by Colleen Wagner, RN  Outcome: Progressing  6/29/2025 1602 by María Elena Best, RN  Outcome: Progressing     
  Problem: Discharge Planning  Goal: Discharge to home or other facility with appropriate resources  Outcome: Progressing     Problem: Neurosensory - Adult  Goal: Achieves stable or improved neurological status  Outcome: Progressing     Problem: Respiratory - Adult  Goal: Achieves optimal ventilation and oxygenation  Outcome: Progressing     Problem: Cardiovascular - Adult  Goal: Maintains optimal cardiac output and hemodynamic stability  Outcome: Progressing     Problem: Skin/Tissue Integrity - Adult  Goal: Skin integrity remains intact  Outcome: Progressing     Problem: Musculoskeletal - Adult  Goal: Return mobility to safest level of function  Outcome: Progressing     Problem: Safety - Adult  Goal: Free from fall injury  Outcome: Progressing     Problem: Pain  Goal: Verbalizes/displays adequate comfort level or baseline comfort level  Outcome: Progressing     
  Problem: Discharge Planning  Goal: Discharge to home or other facility with appropriate resources  Outcome: Progressing     Problem: Neurosensory - Adult  Goal: Achieves stable or improved neurological status  Outcome: Progressing     Problem: Respiratory - Adult  Goal: Achieves optimal ventilation and oxygenation  Outcome: Progressing     Problem: Cardiovascular - Adult  Goal: Maintains optimal cardiac output and hemodynamic stability  Outcome: Progressing  Goal: Absence of cardiac dysrhythmias or at baseline  Outcome: Progressing     Problem: Skin/Tissue Integrity - Adult  Goal: Skin integrity remains intact  Outcome: Progressing     Problem: Musculoskeletal - Adult  Goal: Return mobility to safest level of function  Outcome: Progressing     Problem: Gastrointestinal - Adult  Goal: Minimal or absence of nausea and vomiting  Outcome: Progressing     Problem: Genitourinary - Adult  Goal: Absence of urinary retention  Outcome: Progressing     Problem: Infection - Adult  Goal: Absence of infection at discharge  Outcome: Progressing     Problem: Metabolic/Fluid and Electrolytes - Adult  Goal: Electrolytes maintained within normal limits  Outcome: Progressing     Problem: Hematologic - Adult  Goal: Maintains hematologic stability  Outcome: Progressing     Problem: Safety - Adult  Goal: Free from fall injury  Outcome: Progressing     Problem: Pain  Goal: Verbalizes/displays adequate comfort level or baseline comfort level  Outcome: Progressing     
End of Shift Note    Bedside shift change report given to Marlen CALLE    (oncoming nurse) by Gisele Sen RN (offgoing nurse).  Report included the following information Nurse Handoff Report    Shift worked:  Night shift      Shift summary and any significant changes:     No acute changes overnight      Concerns for physician to address:  N/a     Zone phone for oncoming shift:   [phone]       Activity:  Activity: In bed  Number times ambulated in hallways past shift: 0  Number of times OOB to chair past shift: 0    Cardiac:   Cardiac Monitoring: Yes      Cardiac Rhythm: Sinus rhythm with PAC    Access:  Current line(s): PIV     Genitourinary:   Urinary status: voiding    Respiratory:   O2 Device: None (Room air)  Chronic home O2 use?: NO  Incentive spirometer at bedside: YES       GI:  Last BM (including prior to admit): 07/04/25  Current diet:    ADULT DIET; Regular; Low Fat/Low Chol/High Fiber/2 gm Na  Passing flatus: YES  Tolerating current diet: YES       Pain Management:   Patient states pain is manageable on current regimen: YES    Skin:  Luis Miguel Scale Score: 23  Interventions: increase time out of bed    Patient Safety:  Fall Score: Cunningham Total Score: 35  Interventions: bed/chair alarm, assistive device (walker, cane. etc), gripper socks, pt to call before getting OOB, and stay with me (per policy)       Length of Stay:  Expected LOS: 11  Actual LOS: 9      Gisele Sen RN  Predictive Model Details          24 (Normal)  Factor Value    Calculated 7/6/2025 20:58 58% Age 80 years old    Deterioration Index Model 18% Cardiac rhythm Sinus rhythm with PAC     6% Sodium abnormal (135 mmol/L)     6% Systolic 138     4% BUN abnormal (24 MG/DL)     4% Potassium abnormal (3.4 mmol/L)     2% Pulse 89     1% Pulse oximetry 98 %     1% Temperature 97.4 °F (36.3 °C)     0% Hematocrit 35.6 %     0% Respiratory rate 16     0% WBC count 5.8 K/uL        Problem: Chronic Conditions and Co-morbidities  Goal: Patient's chronic 
Predictive Model Details          24 (Normal)  Factor Value    Calculated 7/9/2025 19:53 57% Age 80 years old    Deterioration Index Model 18% Cardiac rhythm Sinus rhythm with PAC;Sinus arrythmia     12% Systolic 154     6% Sodium abnormal (135 mmol/L)     3% Potassium 3.5 mmol/L     2% Pulse 89     2% Pulse oximetry 94 %     0% Temperature 97.4 °F (36.3 °C)     0% WBC count 4.9 K/uL     0% Respiratory rate 16     0% Hematocrit 38.9 %    End of Shift Note    Bedside shift change report given to aM RN (oncoming nurse) by LUCÍA BRUNNER RN (offgoing nurse).  Report included the following information SBAR, Kardex, Intake/Output, MAR, Recent Results, and Cardiac Rhythm SR SA w pacs, variable MT intervals    Shift worked:  7p-7a     Shift summary and any significant changes:     uneventful     Concerns for physician to address:  Dc planning     Zone phone for oncoming shift:   [phone]       Activity:  Level of Assistance: Standby assist, set-up cues, supervision of patient - no hands on  Number times ambulated in hallways past shift: 1  Number of times OOB to chair past shift: 2    Cardiac:   Cardiac Monitoring: Yes      Cardiac Rhythm: Sinus rhythm with PAC, Sinus arrythmia (differing pr intervals)    Access:  Current line(s): PIV     Genitourinary:   Urinary Status: Voiding    Respiratory:   O2 Device: None (Room air)  Chronic home O2 use?: NO  Incentive spirometer at bedside: YES    GI:  Last BM (including prior to admit): 07/07/25  Current diet:  ADULT DIET; Regular; Low Fat/Low Chol/High Fiber/2 gm Na  DIET ONE TIME MESSAGE;  Passing flatus: YES    Pain Management:   Patient states pain is manageable on current regimen: YES    Skin:  Luis Miguel Scale Score: 21  Interventions: Wound Offloading (Prevention Methods): Repositioning    Patient Safety:  Fall Risk: Nursing Judgement-Fall Risk High(Add Comments): Yes  Fall Risk Interventions  Nursing Judgement-Fall Risk High(Add Comments): Yes  Toilet Every 2 Hours-In 
Predictive Model Details          25 (Normal)  Factor Value    Calculated 7/3/2025 08:21 54% Age 80 years old    Deterioration Index Model 17% Cardiac rhythm Atrial fib     10% Respiratory rate 18     5% Systolic 135     5% Sodium 136 mmol/L     4% BUN abnormal (22 MG/DL)     4% Pulse 55     2% Potassium 3.6 mmol/L     1% Pulse oximetry 98 %     0% Temperature 98 °F (36.7 °C)     0% WBC count 5.3 K/uL     0% Hematocrit 37.5 %        Problem: Chronic Conditions and Co-morbidities  Goal: Patient's chronic conditions and co-morbidity symptoms are monitored and maintained or improved  7/3/2025 0820 by Neha Arenas RN  Outcome: Progressing  Flowsheets (Taken 7/3/2025 0710)  Care Plan - Patient's Chronic Conditions and Co-Morbidity Symptoms are Monitored and Maintained or Improved: Monitor and assess patient's chronic conditions and comorbid symptoms for stability, deterioration, or improvement  7/3/2025 0159 by Kana Dominique RN  Outcome: Progressing     Problem: Discharge Planning  Goal: Discharge to home or other facility with appropriate resources  7/3/2025 0820 by Neha Arenas RN  Outcome: Progressing  Flowsheets (Taken 7/3/2025 0710)  Discharge to home or other facility with appropriate resources: Identify barriers to discharge with patient and caregiver  7/3/2025 0159 by Kana Dominique RN  Outcome: Progressing     Problem: Neurosensory - Adult  Goal: Achieves stable or improved neurological status  7/3/2025 0820 by Neha Arenas RN  Outcome: Progressing  Flowsheets (Taken 7/3/2025 0710)  Achieves stable or improved neurological status: Assess for and report changes in neurological status  7/3/2025 0159 by Kana Dominique RN  Outcome: Progressing     Problem: Respiratory - Adult  Goal: Achieves optimal ventilation and oxygenation  7/3/2025 0820 by Neha Arenas RN  Outcome: Progressing  Flowsheets (Taken 7/3/2025 0710)  Achieves optimal ventilation and oxygenation: Assess for changes in 
Predictive Model Details          25 (Normal)  Factor Value    Calculated 7/7/2025 08:58 54% Age 80 years old    Deterioration Index Model 17% Cardiac rhythm Atrial fib;Sinus arrythmia     10% Respiratory rate 18     6% Sodium abnormal (135 mmol/L)     6% Systolic 138     4% BUN abnormal (24 MG/DL)     3% Potassium abnormal (3.4 mmol/L)     0% Temperature 97.7 °F (36.5 °C)     0% Pulse 78     0% Hematocrit 35.6 %     0% Pulse oximetry 96 %     0% WBC count 5.8 K/uL        Problem: Chronic Conditions and Co-morbidities  Goal: Patient's chronic conditions and co-morbidity symptoms are monitored and maintained or improved  7/7/2025 0857 by Neha Arenas RN  Outcome: Progressing  Flowsheets (Taken 7/7/2025 0730)  Care Plan - Patient's Chronic Conditions and Co-Morbidity Symptoms are Monitored and Maintained or Improved: Monitor and assess patient's chronic conditions and comorbid symptoms for stability, deterioration, or improvement  7/6/2025 2058 by Gisele Sen RN  Outcome: Progressing  Flowsheets (Taken 7/6/2025 2000)  Care Plan - Patient's Chronic Conditions and Co-Morbidity Symptoms are Monitored and Maintained or Improved: Monitor and assess patient's chronic conditions and comorbid symptoms for stability, deterioration, or improvement     Problem: Discharge Planning  Goal: Discharge to home or other facility with appropriate resources  7/7/2025 0857 by Neha Arenas RN  Outcome: Progressing  Flowsheets (Taken 7/7/2025 0730)  Discharge to home or other facility with appropriate resources: Identify barriers to discharge with patient and caregiver  7/6/2025 2058 by Gisele Sen RN  Outcome: Progressing  Flowsheets (Taken 7/6/2025 2000)  Discharge to home or other facility with appropriate resources: Identify barriers to discharge with patient and caregiver     Problem: Neurosensory - Adult  Goal: Achieves stable or improved neurological status  7/7/2025 0857 by Neha Arenas 
Predictive Model Details          32 (Caution)  Factor Value    Calculated 7/9/2025 02:02 39% Age 80 years old    Deterioration Index Model 34% Supplemental oxygen Nasal cannula     12% Cardiac rhythm Sinus rhythm with PAC     9% Systolic 156     3% Sodium 137 mmol/L     2% Pulse oximetry 99 %     1% Pulse 66     0% Temperature 97.4 °F (36.3 °C)     0% Hematocrit 35.8 %     0% Potassium 3.9 mmol/L     0% WBC count 5.3 K/uL     0% Respiratory rate 16    End of Shift Note    Bedside shift change report given to AM RN (oncoming nurse) by LUCÍA BRUNNER RN (offgoing nurse).  Report included the following information SBAR, Kardex, Intake/Output, MAR, Recent Results, and Cardiac Rhythm SR W PAC\"S    Shift worked:  7p-7a     Shift summary and any significant changes:     NGUYỄN w room activity and bathing activity, no acute distress.  VS unchanged.  CHG bath complete.  Pt extremely allergic to telemetry patches.  NPO p MN x sips of water .  Pt is 3rd case today     Concerns for physician to address:  CL breakfast?      Zone phone for oncoming shift:   [phone]       Activity:  Level of Assistance: Standby assist, set-up cues, supervision of patient - no hands on  Number times ambulated in hallways past shift: 0 due to restriction  Number of times OOB to chair past shift: 2    Cardiac:   Cardiac Monitoring: Yes      Cardiac Rhythm: Sinus rhythm with PAC    Access:  Current line(s): PIV     Genitourinary:   Urinary Status: Voiding    Respiratory:   O2 Device: Nasal cannula  Chronic home O2 use?: NO  Incentive spirometer at bedside: YES    GI:  Last BM (including prior to admit): 07/07/25  Current diet:  Diet NPO  Passing flatus: YES    Pain Management:   Patient states pain is manageable on current regimen: YES    Skin:  Luis Miguel Scale Score: 21  Interventions: Wound Offloading (Prevention Methods): Repositioning    Patient Safety:  Fall Risk: Nursing Judgement-Fall Risk High(Add Comments): Yes  Fall Risk 
infection at discharge  Outcome: Progressing     Problem: Metabolic/Fluid and Electrolytes - Adult  Goal: Electrolytes maintained within normal limits  Outcome: Progressing     Problem: Hematologic - Adult  Goal: Maintains hematologic stability  Outcome: Progressing     
optimal cardiac output and hemodynamic stability: Monitor blood pressure and heart rate  Goal: Absence of cardiac dysrhythmias or at baseline  Outcome: Progressing  Flowsheets (Taken 7/5/2025 1908)  Absence of cardiac dysrhythmias or at baseline: Monitor cardiac rate and rhythm     Problem: Skin/Tissue Integrity - Adult  Goal: Skin integrity remains intact  Outcome: Progressing  Flowsheets (Taken 7/5/2025 1908)  Skin Integrity Remains Intact: Monitor for areas of redness and/or skin breakdown     Problem: Musculoskeletal - Adult  Goal: Return mobility to safest level of function  Outcome: Progressing  Flowsheets (Taken 7/5/2025 1908)  Return Mobility to Safest Level of Function: Assess patient stability and activity tolerance for standing, transferring and ambulating with or without assistive devices     Problem: Gastrointestinal - Adult  Goal: Minimal or absence of nausea and vomiting  Outcome: Progressing  Flowsheets (Taken 7/5/2025 1908)  Minimal or absence of nausea and vomiting: Provide nonpharmacologic comfort measures as appropriate     Problem: Genitourinary - Adult  Goal: Absence of urinary retention  Outcome: Progressing  Flowsheets (Taken 7/5/2025 1908)  Absence of urinary retention: Monitor intake/output and perform bladder scan as needed     Problem: Infection - Adult  Goal: Absence of infection at discharge  Outcome: Progressing  Flowsheets (Taken 7/5/2025 1908)  Absence of infection at discharge: Assess and monitor for signs and symptoms of infection     Problem: Metabolic/Fluid and Electrolytes - Adult  Goal: Electrolytes maintained within normal limits  Outcome: Progressing  Flowsheets (Taken 7/5/2025 1908)  Electrolytes maintained within normal limits: Monitor labs and assess patient for signs and symptoms of electrolyte imbalances     Problem: Hematologic - Adult  Goal: Maintains hematologic stability  Outcome: Progressing  Flowsheets (Taken 7/5/2025 1908)  Maintains hematologic stability: Assess 
stability  7/5/2025 0135 by Luci Patterson RN  Outcome: Progressing  Flowsheets (Taken 7/4/2025 1921)  Maintains optimal cardiac output and hemodynamic stability: Monitor blood pressure and heart rate  7/4/2025 1419 by Silvio Garcia RN  Outcome: Progressing  Flowsheets (Taken 7/4/2025 0738)  Maintains optimal cardiac output and hemodynamic stability:   Monitor blood pressure and heart rate   Monitor urine output and notify Licensed Independent Practitioner for values outside of normal range   Assess for signs of decreased cardiac output  Goal: Absence of cardiac dysrhythmias or at baseline  7/5/2025 0135 by Luci Patterson RN  Outcome: Progressing  Flowsheets (Taken 7/4/2025 1921)  Absence of cardiac dysrhythmias or at baseline: Monitor cardiac rate and rhythm  7/4/2025 1419 by Silvio Garcia RN  Outcome: Progressing  Flowsheets (Taken 7/4/2025 0738)  Absence of cardiac dysrhythmias or at baseline: Monitor cardiac rate and rhythm     Problem: Skin/Tissue Integrity - Adult  Goal: Skin integrity remains intact  7/5/2025 0135 by Luci Patterson RN  Outcome: Progressing  Flowsheets (Taken 7/4/2025 1921)  Skin Integrity Remains Intact: Monitor for areas of redness and/or skin breakdown  7/4/2025 1419 by Silvio Garcia RN  Outcome: Progressing  Flowsheets (Taken 7/4/2025 0738)  Skin Integrity Remains Intact:   Monitor for areas of redness and/or skin breakdown   Assess vascular access sites hourly     Problem: Musculoskeletal - Adult  Goal: Return mobility to safest level of function  7/5/2025 0135 by Luci Patterson RN  Outcome: Progressing  Flowsheets (Taken 7/4/2025 1921)  Return Mobility to Safest Level of Function: Assess patient stability and activity tolerance for standing, transferring and ambulating with or without assistive devices  7/4/2025 1419 by Silvio Garcia RN  Outcome: Progressing     Problem: Gastrointestinal - Adult  Goal: Minimal or absence of nausea and 
(Taken 7/10/2025 0715)  Absence of infection at discharge: Assess and monitor for signs and symptoms of infection  7/9/2025 1953 by Luci Patterson RN  Outcome: Progressing  Flowsheets (Taken 7/9/2025 1900)  Absence of infection at discharge: Assess and monitor for signs and symptoms of infection     
Assess pain using appropriate pain scale   Administer analgesics based on type and severity of pain and evaluate response   Implement non-pharmacological measures as appropriate and evaluate response   Consider cultural and social influences on pain and pain management     
Progressing  Flowsheets (Taken 7/7/2025 2000)  Electrolytes maintained within normal limits: Monitor labs and assess patient for signs and symptoms of electrolyte imbalances  7/7/2025 0857 by Neha Arenas RN  Outcome: Progressing  Flowsheets (Taken 7/7/2025 0730)  Electrolytes maintained within normal limits: Monitor labs and assess patient for signs and symptoms of electrolyte imbalances     Problem: Hematologic - Adult  Goal: Maintains hematologic stability  7/7/2025 2027 by Gisele Sen RN  Outcome: Progressing  Flowsheets (Taken 7/7/2025 2000)  Maintains hematologic stability: Assess for signs and symptoms of bleeding or hemorrhage  7/7/2025 0857 by Neha Arenas RN  Outcome: Progressing  Flowsheets (Taken 7/7/2025 0730)  Maintains hematologic stability: Assess for signs and symptoms of bleeding or hemorrhage     Problem: Safety - Adult  Goal: Free from fall injury  7/7/2025 2027 by Gisele Sen RN  Outcome: Progressing  Flowsheets (Taken 7/7/2025 2026)  Free From Fall Injury: Instruct family/caregiver on patient safety  7/7/2025 0857 by Neha Arenas RN  Outcome: Progressing     Problem: Pain  Goal: Verbalizes/displays adequate comfort level or baseline comfort level  7/7/2025 2027 by Gisele Sen RN  Outcome: Progressing  7/7/2025 0857 by Neha Arenas RN  Outcome: Progressing     
appropriate pain scale   Administer analgesics based on type and severity of pain and evaluate response   Implement non-pharmacological measures as appropriate and evaluate response   Consider cultural and social influences on pain and pain management  7/3/2025 0820 by Neha Arenas, RN  Outcome: Progressing

## 2025-07-10 NOTE — ANESTHESIA POSTPROCEDURE EVALUATION
Post-Anesthesia Evaluation and Assessment    Patient: Roxanna Schuster MRN: [id number]  SSN: [id number]    YOB: 1945  Age: 80 y.o.  Sex: female      I have evaluated the patient and they are stable and ready for discharge from the PACU.     Cardiovascular Function/Vital Signs  Visit Vitals  /80   Pulse 80   Temp 97.6 °F (36.4 °C) (Oral)   Resp 18   Ht 1.575 m (5' 2\")   Wt 66.6 kg (146 lb 12.8 oz)   SpO2 96%   BMI 26.85 kg/m²       Patient is status post Monitor Anesthesia Care anesthesia for Procedure(s):  TRANSCATHETER AORTIC VALVE REPLACEMENT FEMORAL APPROACH  .  Insert temporary pacemaker  Aortic root aortogram  Ultrasound guided vascular access  Angiography w/ bilateral runoff.    Nausea/Vomiting: None    Postoperative hydration reviewed and adequate.    Pain:      Managed    Neurological Status:       At baseline    Mental Status, Level of Consciousness: Alert and  oriented to person, place, and time    Pulmonary Status:       Adequate oxygenation and airway patent    Complications related to anesthesia: None    Post-anesthesia assessment completed. No concerns    Signed By: Tien Zamudio MD     July 10, 2025

## 2025-07-11 LAB
EKG ATRIAL RATE: 72 BPM
EKG ATRIAL RATE: 75 BPM
EKG DIAGNOSIS: NORMAL
EKG DIAGNOSIS: NORMAL
EKG P AXIS: 74 DEGREES
EKG P-R INTERVAL: 260 MS
EKG Q-T INTERVAL: 396 MS
EKG Q-T INTERVAL: 420 MS
EKG QRS DURATION: 96 MS
EKG QRS DURATION: 98 MS
EKG QTC CALCULATION (BAZETT): 418 MS
EKG QTC CALCULATION (BAZETT): 429 MS
EKG R AXIS: 66 DEGREES
EKG R AXIS: 73 DEGREES
EKG T AXIS: 114 DEGREES
EKG T AXIS: 119 DEGREES
EKG VENTRICULAR RATE: 63 BPM
EKG VENTRICULAR RATE: 67 BPM

## 2025-07-14 ASSESSMENT — KANSAS CITY CARDIOMYOPATHY QUESTIONNAIRE (KCCQ12)
8C. OVER THE PAST 2 WEEKS, ON AVERAGE, HOW HAS HEART FAILURE LIMITED YOU ABILITY TO VISIT FAMILY AND FRIENDS OUR OF YOUR HOME: SEVERELY LIMITED
1A. OVER THE PAST 2 WEEKS, HOW MUCH WERE YOU LIMITED BY HEART FAILURE SYMPTOMS (SHORTNESS OF BREATH OR FATIGUE) WHEN SHOWERING OR BATHING: QUITE A BIT LIMITED
3. OVER THE PAST 2 WEEKS, ON AVERAGE, HOW MANY TIMES HAS FATIGUE LIMITED YOUR ABILITY TO DO WHAT YOU WANTED: SEVERAL TIMES PER DAY
1B. OVER THE PAST 2 WEEKS, HOW MUCH WERE YOU LIMITED BY HEART FAILURE SYMPTOMS (SHORTNESS OF BREATH OR FATIGUE) WHEN WALKING 1 BLOCK ON LEVEL GROUND: QUITE A BIT LIMITED
5. OVER THE PAST 2 WEEKS, ON AVERAGE, HOW MANY TIMES HAVE YOU BEEN FORCED TO SLEEP SITTING UP IN A CHAIR OR WITH AT LEAST 3 PILLOWS TO PROP YOU UP BECAUSE OF SHORTNESS OF BREATH?: NEVER OVER THE PAST 2 WEEKS
7. IF YOU HAD TO SPEND THE REST OF YOUR LIFE WITH YOUR HEART FAILURE THE WAY IT IS RIGHT NOW, HOW WOULD YOU FEEL ABOUT THIS?: NOT AT ALL SATISFIED
8B. OVER THE PAST 2 WEEKS, ON AVERAGE, HOW HAS HEART FAILURE LIMITED YOU ABILITY TO WORK OR DO HOUSEHOLD CHORES: SEVERELY LIMITED
8A. OVER THE PAST 2 WEEKS, ON AVERAGE, HOW HAS HEART FAILURE LIMITED YOU ABILITY TO DO HOBBIES OR RECREATIONAL ACTIVITIES: SEVERELY LIMITED
1C. OVER THE PAST 2 WEEKS, HOW MUCH WERE YOU LIMITED BY HEART FAILURE SYMPTOMS (SHORTNESS OF BREATH OR FATIGUE) WHEN HURRYING OR JOGGING (AS IF TO CATCH A BUS): EXTREMELY LIMITED
6. OVER THE PAST 2 WEEKS, HOW MUCH HAS YOUR HEART FAILURE LIMITED YOUR ENJOYMENT OF LIFE: IT HAS EXTREMELY LIMITED MY ENJOYMENT OF LIFE
2. OVER THE PAST 2 WEEKS, HOW MANY TIMES DID YOU HAVE SWELLING IN YOUR FEET, ANKLES OR LEGS WHEN YOU WOKE UP IN THE MORNING: 3 OR MORE TIMES PER WEEK BUT NOT EVERY DAY

## 2025-07-16 NOTE — PROGRESS NOTES
Patient: Roxanna Schuster   Age: 80 y.o.     Patient Care Team:  Mitchell Gruber MD as PCP - General  Mitchell Gruber MD as PCP - Empaneled Provider  Austin Zimmer MD as Consulting Physician (Cardiovascular Disease)  Munir Jim MD as Consulting Physician (Cardiothoracic Surgery)  Earlene Crowley, RN as Care Transitions Nurse  Aury Michael APRN - NP (Hospitalist)    PCP: Mitchell Gruber MD    Cardiologist: ALEAH     HPI: 80 y.o. y.o. female  S/P s/p #20 mm Acevedo Shawn S3  with Dr. Jim and with Dr. Zimmer under MAC anesthesia on 7/9/25.  There were no intra-op or post-op complications and Roxanna Schuster was discharged home on POD1. Pre-op sx included chest pain, shortness of breath and generalized fatigue and malaise.     7/17/25: Patient is seen today for 1 week follow up. Overall doing better but has been struggling with sleep and with GI discomfort the past few days (has a history of diverticulosis and recently reintroduced Metamucil). She has trouble falling asleep but once she is asleep she generally does okay. Was on hydroxyzine PTA- this was stopped at discharge. She had a few left at home which she has tried but has so far have much provided her with much relief. Has tried melatonin unsuccessfully in the past. She thinks that she slept better on the nasal cannula overnight in the hospital than she does with her bipap. Had a good BM this AM- Bentyl gives her some relief. Sees PCP in AM.  Otherwise has been walking and following precautions. Had some SOB walking in this morning but she attributes this to her GI discomfort and fatigue. A little lightheadedness with standing, but much improved from before TAVR. Had another episode of visual disturbance yesterday (has been a chronic issue she has been dealing with since initial AVR). Does not yet have a date for cardiac rehab. Questions/concerns answered.       Last Dental Visit:  April 2025   Any dental pain/concerns:

## 2025-07-17 ENCOUNTER — OFFICE VISIT (OUTPATIENT)
Age: 80
End: 2025-07-17
Payer: MEDICARE

## 2025-07-17 VITALS
TEMPERATURE: 98 F | OXYGEN SATURATION: 98 % | DIASTOLIC BLOOD PRESSURE: 65 MMHG | WEIGHT: 152 LBS | HEART RATE: 68 BPM | BODY MASS INDEX: 27.8 KG/M2 | SYSTOLIC BLOOD PRESSURE: 149 MMHG

## 2025-07-17 DIAGNOSIS — Z95.2 S/P TAVR (TRANSCATHETER AORTIC VALVE REPLACEMENT): Primary | ICD-10-CM

## 2025-07-17 PROCEDURE — 1111F DSCHRG MED/CURRENT MED MERGE: CPT | Performed by: THORACIC SURGERY (CARDIOTHORACIC VASCULAR SURGERY)

## 2025-07-17 PROCEDURE — G8399 PT W/DXA RESULTS DOCUMENT: HCPCS | Performed by: THORACIC SURGERY (CARDIOTHORACIC VASCULAR SURGERY)

## 2025-07-17 PROCEDURE — 1036F TOBACCO NON-USER: CPT | Performed by: THORACIC SURGERY (CARDIOTHORACIC VASCULAR SURGERY)

## 2025-07-17 PROCEDURE — 3077F SYST BP >= 140 MM HG: CPT | Performed by: THORACIC SURGERY (CARDIOTHORACIC VASCULAR SURGERY)

## 2025-07-17 PROCEDURE — 1123F ACP DISCUSS/DSCN MKR DOCD: CPT | Performed by: THORACIC SURGERY (CARDIOTHORACIC VASCULAR SURGERY)

## 2025-07-17 PROCEDURE — G8427 DOCREV CUR MEDS BY ELIG CLIN: HCPCS | Performed by: THORACIC SURGERY (CARDIOTHORACIC VASCULAR SURGERY)

## 2025-07-17 PROCEDURE — 1090F PRES/ABSN URINE INCON ASSESS: CPT | Performed by: THORACIC SURGERY (CARDIOTHORACIC VASCULAR SURGERY)

## 2025-07-17 PROCEDURE — G8419 CALC BMI OUT NRM PARAM NOF/U: HCPCS | Performed by: THORACIC SURGERY (CARDIOTHORACIC VASCULAR SURGERY)

## 2025-07-17 PROCEDURE — 3078F DIAST BP <80 MM HG: CPT | Performed by: THORACIC SURGERY (CARDIOTHORACIC VASCULAR SURGERY)

## 2025-07-17 PROCEDURE — 1159F MED LIST DOCD IN RCRD: CPT | Performed by: THORACIC SURGERY (CARDIOTHORACIC VASCULAR SURGERY)

## 2025-07-17 PROCEDURE — 99213 OFFICE O/P EST LOW 20 MIN: CPT | Performed by: THORACIC SURGERY (CARDIOTHORACIC VASCULAR SURGERY)

## 2025-07-17 NOTE — PROGRESS NOTES
Name: Roxanna Schuster   : 1945   Home Phone: [phone]     Reviewed record in preparation for visit and have obtained necessary documentation. Identified pt with two pt identifiers (name and ).     1. Have you been to the ER, urgent care clinic since your last visit?  Hospitalized since your last visit?No    2. Have you seen or consulted any other health care providers outside of the [facility] since your last visit?  Include any pap smears or colon screening. No      Roxanna Schuster is being seen for TAVR follow up approximately “One week post-operatively.     Patient counseled on Office contact information and instructed to follow up  in approximately one month. Patient given opportunity to ask questions and receive clarification.     Current Medications-Reviewed with Patient    Allergies-Reviewed with Patient        Vitals  BP (!) 149/65   Pulse 68   Temp 98 °F (36.7 °C)   Wt 68.9 kg (152 lb)   SpO2 98%   BMI 27.80 kg/m²

## 2025-08-02 PROBLEM — Z01.810 PREOP CARDIOVASCULAR EXAM: Status: RESOLVED | Noted: 2025-07-03 | Resolved: 2025-08-02

## 2025-08-11 ASSESSMENT — KANSAS CITY CARDIOMYOPATHY QUESTIONNAIRE (KCCQ12)
3. OVER THE PAST 2 WEEKS, ON AVERAGE, HOW MANY TIMES HAS FATIGUE LIMITED YOUR ABILITY TO DO WHAT YOU WANTED: LESS THAN ONCE A WEEK
8B. OVER THE PAST 2 WEEKS, ON AVERAGE, HOW HAS HEART FAILURE LIMITED YOU ABILITY TO WORK OR DO HOUSEHOLD CHORES: DID NOT LIMIT AT ALL
6. OVER THE PAST 2 WEEKS, HOW MUCH HAS YOUR HEART FAILURE LIMITED YOUR ENJOYMENT OF LIFE: IT HAS NOT LIMITED MY ENJOYMENT OF LIFE AT ALL
1C. OVER THE PAST 2 WEEKS, HOW MUCH WERE YOU LIMITED BY HEART FAILURE SYMPTOMS (SHORTNESS OF BREATH OR FATIGUE) WHEN HURRYING OR JOGGING (AS IF TO CATCH A BUS): SLIGHTLY LIMITED
8A. OVER THE PAST 2 WEEKS, ON AVERAGE, HOW HAS HEART FAILURE LIMITED YOU ABILITY TO DO HOBBIES OR RECREATIONAL ACTIVITIES: DID NOT LIMIT AT ALL
1A. OVER THE PAST 2 WEEKS, HOW MUCH WERE YOU LIMITED BY HEART FAILURE SYMPTOMS (SHORTNESS OF BREATH OR FATIGUE) WHEN SHOWERING OR BATHING: NOT AT ALL LIMITED
8C. OVER THE PAST 2 WEEKS, ON AVERAGE, HOW HAS HEART FAILURE LIMITED YOU ABILITY TO VISIT FAMILY AND FRIENDS OUR OF YOUR HOME: DID NOT LIMIT AT ALL
2. OVER THE PAST 2 WEEKS, HOW MANY TIMES DID YOU HAVE SWELLING IN YOUR FEET, ANKLES OR LEGS WHEN YOU WOKE UP IN THE MORNING: 3 OR MORE TIMES PER WEEK BUT NOT EVERY DAY
5. OVER THE PAST 2 WEEKS, ON AVERAGE, HOW MANY TIMES HAVE YOU BEEN FORCED TO SLEEP SITTING UP IN A CHAIR OR WITH AT LEAST 3 PILLOWS TO PROP YOU UP BECAUSE OF SHORTNESS OF BREATH?: NEVER OVER THE PAST 2 WEEKS
7. IF YOU HAD TO SPEND THE REST OF YOUR LIFE WITH YOUR HEART FAILURE THE WAY IT IS RIGHT NOW, HOW WOULD YOU FEEL ABOUT THIS?: MOSTLY SATISFIED
1B. OVER THE PAST 2 WEEKS, HOW MUCH WERE YOU LIMITED BY HEART FAILURE SYMPTOMS (SHORTNESS OF BREATH OR FATIGUE) WHEN WALKING 1 BLOCK ON LEVEL GROUND: NOT AT ALL LIMITED

## 2025-08-13 ENCOUNTER — OFFICE VISIT (OUTPATIENT)
Age: 80
End: 2025-08-13
Payer: MEDICARE

## 2025-08-13 VITALS
WEIGHT: 152.8 LBS | TEMPERATURE: 98.2 F | HEART RATE: 84 BPM | BODY MASS INDEX: 28.12 KG/M2 | DIASTOLIC BLOOD PRESSURE: 64 MMHG | RESPIRATION RATE: 16 BRPM | SYSTOLIC BLOOD PRESSURE: 130 MMHG | HEIGHT: 62 IN | OXYGEN SATURATION: 99 %

## 2025-08-13 DIAGNOSIS — Z95.2 S/P TAVR (TRANSCATHETER AORTIC VALVE REPLACEMENT): Primary | ICD-10-CM

## 2025-08-13 PROCEDURE — G8419 CALC BMI OUT NRM PARAM NOF/U: HCPCS

## 2025-08-13 PROCEDURE — 3078F DIAST BP <80 MM HG: CPT

## 2025-08-13 PROCEDURE — G8419 CALC BMI OUT NRM PARAM NOF/U: HCPCS | Performed by: THORACIC SURGERY (CARDIOTHORACIC VASCULAR SURGERY)

## 2025-08-13 PROCEDURE — G8399 PT W/DXA RESULTS DOCUMENT: HCPCS

## 2025-08-13 PROCEDURE — 1090F PRES/ABSN URINE INCON ASSESS: CPT | Performed by: THORACIC SURGERY (CARDIOTHORACIC VASCULAR SURGERY)

## 2025-08-13 PROCEDURE — 1126F AMNT PAIN NOTED NONE PRSNT: CPT

## 2025-08-13 PROCEDURE — 1090F PRES/ABSN URINE INCON ASSESS: CPT

## 2025-08-13 PROCEDURE — G8427 DOCREV CUR MEDS BY ELIG CLIN: HCPCS | Performed by: THORACIC SURGERY (CARDIOTHORACIC VASCULAR SURGERY)

## 2025-08-13 PROCEDURE — G8399 PT W/DXA RESULTS DOCUMENT: HCPCS | Performed by: THORACIC SURGERY (CARDIOTHORACIC VASCULAR SURGERY)

## 2025-08-13 PROCEDURE — 1123F ACP DISCUSS/DSCN MKR DOCD: CPT

## 2025-08-13 PROCEDURE — 3075F SYST BP GE 130 - 139MM HG: CPT

## 2025-08-13 PROCEDURE — 99213 OFFICE O/P EST LOW 20 MIN: CPT

## 2025-08-13 PROCEDURE — G8427 DOCREV CUR MEDS BY ELIG CLIN: HCPCS

## 2025-08-13 PROCEDURE — 1159F MED LIST DOCD IN RCRD: CPT

## 2025-08-13 PROCEDURE — 1036F TOBACCO NON-USER: CPT | Performed by: THORACIC SURGERY (CARDIOTHORACIC VASCULAR SURGERY)

## 2025-08-15 ENCOUNTER — TRANSCRIBE ORDERS (OUTPATIENT)
Facility: HOSPITAL | Age: 80
End: 2025-08-15

## 2025-08-15 DIAGNOSIS — Z95.4 S/P AORTIC VALVE ALLOGRAFT: Primary | ICD-10-CM

## (undated) DEVICE — TRANSFER SET 3": Brand: MEDLINE INDUSTRIES, INC.

## (undated) DEVICE — 3M™ TEGADERM™ TRANSPARENT FILM DRESSING FRAME STYLE, 1626W, 4 IN X 4-3/4 IN (10 CM X 12 CM), 50/CT 4CT/CASE: Brand: 3M™ TEGADERM™

## (undated) DEVICE — SUTURE ABSRB L30CM 2-0 VLT SPRL PDS + STRATAFIX SXPP1B410

## (undated) DEVICE — PREP SKN CHLRAPRP APL 26ML STR --

## (undated) DEVICE — CATHETER ABLAT 8FR L115CM 1-6-2MM SPC TIP 3.5MM FJ CRV

## (undated) DEVICE — CATH RMFG DECA DUO 7F2/8 95S --

## (undated) DEVICE — PROVE COVER: Brand: UNBRANDED

## (undated) DEVICE — KIT ACCS INTRO 4FR L10CM NDL 21GA L7CM GWIRE L40CM

## (undated) DEVICE — SUTURE STRATAFIX SZ 3-0 30CM NONABSORB UD 26MM FS 3/8 SXMP2B412

## (undated) DEVICE — SUTURE VCRL SZ 0 L36IN ABSRB UD L36MM CT-1 1/2 CIR J946H

## (undated) DEVICE — DRESSING HEMSTAT W4XL4IN 4 PLY WHT IMPREG KAOLIN HYDRPHLC

## (undated) DEVICE — CATHETER ULTRASOUND 10 FRX90 CM FOR CARTO 3 SOUNDSTAR ECO

## (undated) DEVICE — PROBE ES TEMP HOT AND CLD FAST ACCURATE SFT FLX CIRCA S CATH

## (undated) DEVICE — PINNACLE INTRODUCER SHEATH: Brand: PINNACLE

## (undated) DEVICE — ELECTRODE PT RET AD L9FT HI MOIST COND ADH HYDRGEL CORDED

## (undated) DEVICE — HEART CATH-MRMC: Brand: MEDLINE INDUSTRIES, INC.

## (undated) DEVICE — DRESSING HEMOSTATIC INTVENT W/O SLT QUIKCLOT

## (undated) DEVICE — CABLE EXT EP H/O/D BLK 150CM --

## (undated) DEVICE — CATHETER PRESSURE WEDGE BLLN 6FRX110CM

## (undated) DEVICE — HANDPIECE SET WITH COAXIAL HIGH FLOW TIP AND SUCTION TUBE: Brand: INTERPULSE

## (undated) DEVICE — CABLE CATH L10FT YEL CONN 12-12 PIN ELECTROGRAM CONDUCTION

## (undated) DEVICE — SOLUTION IRRIG 3000ML 0.9% SOD CHL USP UROMATIC PLAS CONT

## (undated) DEVICE — SHTH GUID 8.5F 22MM MED CRV -- CARTO VIZIGO

## (undated) DEVICE — DRESSING WND ISLAND STD 4X10 IN MULT LAYR STRL SILVERLON

## (undated) DEVICE — SUTURE PERMAHAND SZ 0 L30IN NONABSORBABLE BLK L26MM SH 1/2 K834H

## (undated) DEVICE — CABLE CATH L2.7M CONNECTS TO CARTO 3 SYS PIU FOR LASSO ECO

## (undated) DEVICE — SUTURE STRATAFIX SPRL SZ 1 L14IN ABSRB VLT L48CM CTX 1/2 SXPD2B405

## (undated) DEVICE — SUTURE VCRL SZ 1 L36IN ABSRB UD L36MM CT-1 1/2 CIR J947H

## (undated) DEVICE — PREP KIT PEEL PTCH POVIDONE IOD

## (undated) DEVICE — MEDI-TRACE CADENCE ADULT, DEFIBRILLATION ELECTRODE -RTS  (10 PR/PK) - PHYSIO-CONTROL: Brand: MEDI-TRACE CADENCE

## (undated) DEVICE — PATCH CARTO 3 EXT REF --

## (undated) DEVICE — STRYKER PERFORMANCE SERIES SAGITTAL BLADE: Brand: STRYKER PERFORMANCE SERIES

## (undated) DEVICE — GUIDEWIRE VASCULAR L145CM 0.035IN J TIP L3MM PTFE FIX COR NAMIC

## (undated) DEVICE — REM POLYHESIVE ADULT PATIENT RETURN ELECTRODE: Brand: VALLEYLAB

## (undated) DEVICE — ACCESS SHEATH WITH DILATOR: Brand: WATCHMAN FXD CURVE™ ACCESS SYSTEM

## (undated) DEVICE — BANDAGE COMPR M W6INXL10YD WHT BGE VELC E MTRX HK AND LOOP

## (undated) DEVICE — CHECK-FLO INTRODUCER SET: Brand: PERFORMER

## (undated) DEVICE — Device: Brand: JELCO

## (undated) DEVICE — CABLE CATH L10FT RED PIN CONN 34-34 FOR THERMOCOOL

## (undated) DEVICE — TOTAL JOINT-MRMC: Brand: MEDLINE INDUSTRIES, INC.

## (undated) DEVICE — CATHETER CARDIAC MULTIPAK MULTIPAK 5FR PERFORMA

## (undated) DEVICE — GUIDEWIRE VASC L40CM DIA0018IN NDL 21GA L7CM Z S STL MAK

## (undated) DEVICE — SOLUTION IRRIG 1000ML STRL H2O USP PLAS POUR BTL

## (undated) DEVICE — GLOVE SURG SZ 8 L12IN FNGR THK79MIL GRN LTX FREE

## (undated) DEVICE — PRESSURE MONITORING SET: Brand: TRUWAVE

## (undated) DEVICE — ZIMMER® STERILE DISPOSABLE TOURNIQUET CUFF WITH PROTECTIVE SLEEVE AND PLC, DUAL PORT, SINGLE BLADDER, 34 IN. (86 CM)

## (undated) DEVICE — SYSTEM SKIN CLSR 22CM DERMBND PRINEO

## (undated) DEVICE — CATHETER ANGIO JR4 PIG 145 DEG 6 FRX100 CM MP SUPER TORQUE +

## (undated) DEVICE — SYSTEM CLOSURE 6-12 FR VEN VASC VASCADE MVP

## (undated) DEVICE — 1 X VERSACROSS CONNECT TRANSSEPTAL DILATOR (INCLUDING 1 X J-TIP MECHANICAL GUIDEWIRE); 1 X VERSACROSS RF WIRE (INCLUDING 1 X CONNECTOR CABLE (SINGLE USE)); 1 X DISPERSIVE ELECTRODE: Brand: VERSACROSS CONNECT LAAC ACCESS SOLUTION

## (undated) DEVICE — PIN EXT FIX SCHNZ 3 MM

## (undated) DEVICE — GLOVE ORTHO 8   MSG9480

## (undated) DEVICE — DRESSING HEMSTAT W3INXL2YD 1 PLY Z FLD QUIKCLOT CONTROL+

## (undated) DEVICE — NDL TRANSEPTAL AD 18GX98CM --

## (undated) DEVICE — TTL1LYR 14FR10ML 100%SILI UMST TR: Brand: MEDLINE

## (undated) DEVICE — CATH EP MAP 2-6-2 7FR F CRV -- PENTARAY

## (undated) DEVICE — PACK SURG PROC KNEE USER GPS

## (undated) DEVICE — SYR 20ML LL STRL LF --

## (undated) DEVICE — 3M™ IOBAN™ 2 ANTIMICROBIAL INCISE DRAPE 6650EZ: Brand: IOBAN™ 2

## (undated) DEVICE — TUBE SET IRR PUMP THERMALCOOL -- SMARTABLATE